# Patient Record
Sex: FEMALE | Race: OTHER | HISPANIC OR LATINO | URBAN - METROPOLITAN AREA
[De-identification: names, ages, dates, MRNs, and addresses within clinical notes are randomized per-mention and may not be internally consistent; named-entity substitution may affect disease eponyms.]

---

## 2017-07-29 ENCOUNTER — INPATIENT (INPATIENT)
Facility: HOSPITAL | Age: 77
LOS: 4 days | Discharge: HOME | End: 2017-08-03
Attending: STUDENT IN AN ORGANIZED HEALTH CARE EDUCATION/TRAINING PROGRAM | Admitting: STUDENT IN AN ORGANIZED HEALTH CARE EDUCATION/TRAINING PROGRAM
Payer: MEDICARE

## 2017-07-29 VITALS
DIASTOLIC BLOOD PRESSURE: 67 MMHG | RESPIRATION RATE: 18 BRPM | HEART RATE: 87 BPM | SYSTOLIC BLOOD PRESSURE: 143 MMHG | TEMPERATURE: 98 F | OXYGEN SATURATION: 100 %

## 2017-07-29 DIAGNOSIS — Z98.89 OTHER SPECIFIED POSTPROCEDURAL STATES: Chronic | ICD-10-CM

## 2017-07-29 DIAGNOSIS — Z90.49 ACQUIRED ABSENCE OF OTHER SPECIFIED PARTS OF DIGESTIVE TRACT: Chronic | ICD-10-CM

## 2017-07-29 LAB
ALBUMIN SERPL ELPH-MCNC: 4.1 G/DL — SIGNIFICANT CHANGE UP (ref 3.3–5)
ALP SERPL-CCNC: 71 U/L — SIGNIFICANT CHANGE UP (ref 40–120)
ALT FLD-CCNC: 13 U/L — SIGNIFICANT CHANGE UP (ref 4–33)
AMYLASE P1 CFR SERPL: 52 U/L — SIGNIFICANT CHANGE UP (ref 25–125)
APPEARANCE UR: CLEAR — SIGNIFICANT CHANGE UP
AST SERPL-CCNC: 15 U/L — SIGNIFICANT CHANGE UP (ref 4–32)
BACTERIA # UR AUTO: SIGNIFICANT CHANGE UP
BASE EXCESS BLDV CALC-SCNC: 1.7 MMOL/L — SIGNIFICANT CHANGE UP
BASOPHILS # BLD AUTO: 0.04 K/UL — SIGNIFICANT CHANGE UP (ref 0–0.2)
BASOPHILS NFR BLD AUTO: 0.4 % — SIGNIFICANT CHANGE UP (ref 0–2)
BILIRUB SERPL-MCNC: 0.6 MG/DL — SIGNIFICANT CHANGE UP (ref 0.2–1.2)
BILIRUB UR-MCNC: NEGATIVE — SIGNIFICANT CHANGE UP
BLOOD GAS VENOUS - CREATININE: 0.7 MG/DL — SIGNIFICANT CHANGE UP (ref 0.5–1.3)
BLOOD UR QL VISUAL: NEGATIVE — SIGNIFICANT CHANGE UP
BUN SERPL-MCNC: 8 MG/DL — SIGNIFICANT CHANGE UP (ref 7–23)
CALCIUM SERPL-MCNC: 9 MG/DL — SIGNIFICANT CHANGE UP (ref 8.4–10.5)
CHLORIDE BLDV-SCNC: 104 MMOL/L — SIGNIFICANT CHANGE UP (ref 96–108)
CHLORIDE SERPL-SCNC: 101 MMOL/L — SIGNIFICANT CHANGE UP (ref 98–107)
CO2 SERPL-SCNC: 25 MMOL/L — SIGNIFICANT CHANGE UP (ref 22–31)
COLOR SPEC: SIGNIFICANT CHANGE UP
CREAT SERPL-MCNC: 0.87 MG/DL — SIGNIFICANT CHANGE UP (ref 0.5–1.3)
EOSINOPHIL # BLD AUTO: 0.05 K/UL — SIGNIFICANT CHANGE UP (ref 0–0.5)
EOSINOPHIL NFR BLD AUTO: 0.4 % — SIGNIFICANT CHANGE UP (ref 0–6)
GAS PNL BLDV: 135 MMOL/L — LOW (ref 136–146)
GLUCOSE BLDV-MCNC: 111 — HIGH (ref 70–99)
GLUCOSE SERPL-MCNC: 112 MG/DL — HIGH (ref 70–99)
GLUCOSE UR-MCNC: NEGATIVE — SIGNIFICANT CHANGE UP
HCO3 BLDV-SCNC: 24 MMOL/L — SIGNIFICANT CHANGE UP (ref 20–27)
HCT VFR BLD CALC: 34.1 % — LOW (ref 34.5–45)
HCT VFR BLDV CALC: 34.6 % — SIGNIFICANT CHANGE UP (ref 34.5–45)
HGB BLD-MCNC: 10.9 G/DL — LOW (ref 11.5–15.5)
HGB BLDV-MCNC: 11.2 G/DL — LOW (ref 11.5–15.5)
IMM GRANULOCYTES # BLD AUTO: 0.05 # — SIGNIFICANT CHANGE UP
IMM GRANULOCYTES NFR BLD AUTO: 0.4 % — SIGNIFICANT CHANGE UP (ref 0–1.5)
KETONES UR-MCNC: NEGATIVE — SIGNIFICANT CHANGE UP
LACTATE BLDV-MCNC: 2 MMOL/L — SIGNIFICANT CHANGE UP (ref 0.5–2)
LEUKOCYTE ESTERASE UR-ACNC: HIGH
LIDOCAIN IGE QN: 17.4 U/L — SIGNIFICANT CHANGE UP (ref 7–60)
LYMPHOCYTES # BLD AUTO: 18.6 % — SIGNIFICANT CHANGE UP (ref 13–44)
LYMPHOCYTES # BLD AUTO: 2.1 K/UL — SIGNIFICANT CHANGE UP (ref 1–3.3)
MCHC RBC-ENTMCNC: 26.3 PG — LOW (ref 27–34)
MCHC RBC-ENTMCNC: 32 % — SIGNIFICANT CHANGE UP (ref 32–36)
MCV RBC AUTO: 82.4 FL — SIGNIFICANT CHANGE UP (ref 80–100)
MONOCYTES # BLD AUTO: 0.43 K/UL — SIGNIFICANT CHANGE UP (ref 0–0.9)
MONOCYTES NFR BLD AUTO: 3.8 % — SIGNIFICANT CHANGE UP (ref 2–14)
MUCOUS THREADS # UR AUTO: SIGNIFICANT CHANGE UP
NEUTROPHILS # BLD AUTO: 8.64 K/UL — HIGH (ref 1.8–7.4)
NEUTROPHILS NFR BLD AUTO: 76.4 % — SIGNIFICANT CHANGE UP (ref 43–77)
NITRITE UR-MCNC: NEGATIVE — SIGNIFICANT CHANGE UP
NRBC # FLD: 0 — SIGNIFICANT CHANGE UP
PCO2 BLDV: 51 MMHG — SIGNIFICANT CHANGE UP (ref 41–51)
PH BLDV: 7.34 PH — SIGNIFICANT CHANGE UP (ref 7.32–7.43)
PH UR: 7.5 — SIGNIFICANT CHANGE UP (ref 4.6–8)
PLATELET # BLD AUTO: 204 K/UL — SIGNIFICANT CHANGE UP (ref 150–400)
PMV BLD: 11.1 FL — SIGNIFICANT CHANGE UP (ref 7–13)
PO2 BLDV: 25 MMHG — LOW (ref 35–40)
POTASSIUM BLDV-SCNC: 4 MMOL/L — SIGNIFICANT CHANGE UP (ref 3.4–4.5)
POTASSIUM SERPL-MCNC: 4.3 MMOL/L — SIGNIFICANT CHANGE UP (ref 3.5–5.3)
POTASSIUM SERPL-SCNC: 4.3 MMOL/L — SIGNIFICANT CHANGE UP (ref 3.5–5.3)
PROT SERPL-MCNC: 6.8 G/DL — SIGNIFICANT CHANGE UP (ref 6–8.3)
PROT UR-MCNC: NEGATIVE — SIGNIFICANT CHANGE UP
RBC # BLD: 4.14 M/UL — SIGNIFICANT CHANGE UP (ref 3.8–5.2)
RBC # FLD: 14.8 % — HIGH (ref 10.3–14.5)
RBC CASTS # UR COMP ASSIST: SIGNIFICANT CHANGE UP (ref 0–?)
SAO2 % BLDV: 38.2 % — LOW (ref 60–85)
SODIUM SERPL-SCNC: 139 MMOL/L — SIGNIFICANT CHANGE UP (ref 135–145)
SP GR SPEC: 1 — SIGNIFICANT CHANGE UP (ref 1–1.03)
SQUAMOUS # UR AUTO: SIGNIFICANT CHANGE UP
UROBILINOGEN FLD QL: NORMAL E.U. — SIGNIFICANT CHANGE UP (ref 0.1–0.2)
WBC # BLD: 11.31 K/UL — HIGH (ref 3.8–10.5)
WBC # FLD AUTO: 11.31 K/UL — HIGH (ref 3.8–10.5)
WBC UR QL: SIGNIFICANT CHANGE UP (ref 0–?)

## 2017-07-29 PROCEDURE — 74177 CT ABD & PELVIS W/CONTRAST: CPT | Mod: 26

## 2017-07-29 RX ORDER — MORPHINE SULFATE 50 MG/1
4 CAPSULE, EXTENDED RELEASE ORAL ONCE
Qty: 0 | Refills: 0 | Status: DISCONTINUED | OUTPATIENT
Start: 2017-07-29 | End: 2017-07-29

## 2017-07-29 RX ORDER — ACETAMINOPHEN 500 MG
1000 TABLET ORAL ONCE
Qty: 0 | Refills: 0 | Status: COMPLETED | OUTPATIENT
Start: 2017-07-29 | End: 2017-07-29

## 2017-07-29 RX ORDER — DIPHENHYDRAMINE HCL 50 MG
25 CAPSULE ORAL ONCE
Qty: 0 | Refills: 0 | Status: COMPLETED | OUTPATIENT
Start: 2017-07-29 | End: 2017-07-29

## 2017-07-29 RX ORDER — METRONIDAZOLE 500 MG
500 TABLET ORAL ONCE
Qty: 0 | Refills: 0 | Status: COMPLETED | OUTPATIENT
Start: 2017-07-29 | End: 2017-07-29

## 2017-07-29 RX ORDER — CIPROFLOXACIN LACTATE 400MG/40ML
400 VIAL (ML) INTRAVENOUS ONCE
Qty: 0 | Refills: 0 | Status: COMPLETED | OUTPATIENT
Start: 2017-07-29 | End: 2017-07-29

## 2017-07-29 RX ORDER — SODIUM CHLORIDE 9 MG/ML
1000 INJECTION INTRAMUSCULAR; INTRAVENOUS; SUBCUTANEOUS ONCE
Qty: 0 | Refills: 0 | Status: COMPLETED | OUTPATIENT
Start: 2017-07-29 | End: 2017-07-29

## 2017-07-29 RX ADMIN — SODIUM CHLORIDE 1000 MILLILITER(S): 9 INJECTION INTRAMUSCULAR; INTRAVENOUS; SUBCUTANEOUS at 18:49

## 2017-07-29 RX ADMIN — MORPHINE SULFATE 4 MILLIGRAM(S): 50 CAPSULE, EXTENDED RELEASE ORAL at 21:40

## 2017-07-29 RX ADMIN — MORPHINE SULFATE 4 MILLIGRAM(S): 50 CAPSULE, EXTENDED RELEASE ORAL at 21:10

## 2017-07-29 RX ADMIN — Medication 100 MILLIGRAM(S): at 22:50

## 2017-07-29 RX ADMIN — MORPHINE SULFATE 4 MILLIGRAM(S): 50 CAPSULE, EXTENDED RELEASE ORAL at 23:51

## 2017-07-29 RX ADMIN — Medication 25 MILLIGRAM(S): at 23:56

## 2017-07-29 RX ADMIN — Medication 1000 MILLIGRAM(S): at 21:10

## 2017-07-29 RX ADMIN — Medication 400 MILLIGRAM(S): at 18:48

## 2017-07-29 NOTE — ED ADULT NURSE NOTE - PSH
History of appendectomy    History of hysterectomy    S/P cholecystectomy  open cholecystectomy, post op retained stone, ERCP/sphincterotomy

## 2017-07-29 NOTE — ED ADULT NURSE NOTE - OBJECTIVE STATEMENT
Pt received in spot 18. Alert and oriented x3. primarily Nepali speaking. Co diffuse abdominal pain x 1 month. Denies n/v/d. Went to GI and diagnosed with colitis and prescribed pepto bismol but states it is not helping. Hx of diverticulitis, htn, high cholesterol, diabetes. Labs drawn and sent. IV placed. Comfort measures provided.  MD at bedside.

## 2017-07-29 NOTE — ED PROVIDER NOTE - PROGRESS NOTE DETAILS
Kb Flood, PGY2: CT shows diverticulitis with intramural abscess. Starting cipro/flagyl and surgery consulted. Pt aware. Given 4mg morphine for pain control

## 2017-07-29 NOTE — ED PROVIDER NOTE - MEDICAL DECISION MAKING DETAILS
78 yo F with PMH of HTN, Hypothyroidism, HLD, DM, diverticulitis p/w abdominal pain for 1 month concerning for possible diverticulitis. Will check cbc, cmp, lipase, CT a/p. Give IV tylenol and 1L NS bolus.

## 2017-07-29 NOTE — ED ADULT NURSE NOTE - PMH
Bilateral dry eyes    Diabetes mellitus    Dyslipidemia    History of ectopic pregnancy    Hypothyroidism    Neuropathy

## 2017-07-29 NOTE — ED ADULT TRIAGE NOTE - CHIEF COMPLAINT QUOTE
Co abdominal pain x 1 month. Denies n/v/d. Went to GI and diagnosed with colitis and prescribed pepto bismol but states it is not helping. Hx of diverticulitis, htn, high cholesterol, diabetes.

## 2017-07-29 NOTE — ED PROVIDER NOTE - ATTENDING CONTRIBUTION TO CARE
Attending Statement: I have personally seen and examined this patient. I have fully participated in the care of this patient. I have reviewed all pertinent clinical information, including history physical exam, plan and the Resident's note and agree except as noted   76yo F hx of HTN, Hypothryroidsim, HLD, DM, hx of diverticulitis pw abdominal pain for a month. Worsening today. Pain located in lower left greater than right. no fever/chills/ no nausea/vomit or diarrhea. +flatus. no urinary complaints. pt went to see gastroenterology 2 weeks ago told to have colitis. symptoms have continued.   Vital signs noted. mild discomfort. normal S1-S2 No resp distress. able to speak in full and clear sentences. no wheeze, rales or stridor. soft tender LLQ. no guarding.  plan labs, IVF, pain med, ct abdomen/pelvis, re asses

## 2017-07-30 DIAGNOSIS — K57.20 DIVERTICULITIS OF LARGE INTESTINE WITH PERFORATION AND ABSCESS WITHOUT BLEEDING: ICD-10-CM

## 2017-07-30 LAB
APTT BLD: 33.1 SEC — SIGNIFICANT CHANGE UP (ref 27.5–37.4)
BLD GP AB SCN SERPL QL: NEGATIVE — SIGNIFICANT CHANGE UP
BUN SERPL-MCNC: 6 MG/DL — LOW (ref 7–23)
CALCIUM SERPL-MCNC: 8.6 MG/DL — SIGNIFICANT CHANGE UP (ref 8.4–10.5)
CHLORIDE SERPL-SCNC: 106 MMOL/L — SIGNIFICANT CHANGE UP (ref 98–107)
CO2 SERPL-SCNC: 24 MMOL/L — SIGNIFICANT CHANGE UP (ref 22–31)
CREAT SERPL-MCNC: 0.79 MG/DL — SIGNIFICANT CHANGE UP (ref 0.5–1.3)
GLUCOSE SERPL-MCNC: 96 MG/DL — SIGNIFICANT CHANGE UP (ref 70–99)
HCT VFR BLD CALC: 34.1 % — LOW (ref 34.5–45)
HGB BLD-MCNC: 11 G/DL — LOW (ref 11.5–15.5)
INR BLD: 1.03 — SIGNIFICANT CHANGE UP (ref 0.88–1.17)
MCHC RBC-ENTMCNC: 26.3 PG — LOW (ref 27–34)
MCHC RBC-ENTMCNC: 32.3 % — SIGNIFICANT CHANGE UP (ref 32–36)
MCV RBC AUTO: 81.4 FL — SIGNIFICANT CHANGE UP (ref 80–100)
NRBC # FLD: 0 — SIGNIFICANT CHANGE UP
PLATELET # BLD AUTO: 191 K/UL — SIGNIFICANT CHANGE UP (ref 150–400)
PMV BLD: 11 FL — SIGNIFICANT CHANGE UP (ref 7–13)
POTASSIUM SERPL-MCNC: 3.7 MMOL/L — SIGNIFICANT CHANGE UP (ref 3.5–5.3)
POTASSIUM SERPL-SCNC: 3.7 MMOL/L — SIGNIFICANT CHANGE UP (ref 3.5–5.3)
PROTHROM AB SERPL-ACNC: 11.5 SEC — SIGNIFICANT CHANGE UP (ref 9.8–13.1)
RBC # BLD: 4.19 M/UL — SIGNIFICANT CHANGE UP (ref 3.8–5.2)
RBC # FLD: 15 % — HIGH (ref 10.3–14.5)
RH IG SCN BLD-IMP: POSITIVE — SIGNIFICANT CHANGE UP
SODIUM SERPL-SCNC: 143 MMOL/L — SIGNIFICANT CHANGE UP (ref 135–145)
WBC # BLD: 9.04 K/UL — SIGNIFICANT CHANGE UP (ref 3.8–10.5)
WBC # FLD AUTO: 9.04 K/UL — SIGNIFICANT CHANGE UP (ref 3.8–10.5)

## 2017-07-30 PROCEDURE — 99232 SBSQ HOSP IP/OBS MODERATE 35: CPT | Mod: GC

## 2017-07-30 PROCEDURE — 93010 ELECTROCARDIOGRAM REPORT: CPT

## 2017-07-30 RX ORDER — INSULIN LISPRO 100/ML
VIAL (ML) SUBCUTANEOUS AT BEDTIME
Qty: 0 | Refills: 0 | Status: DISCONTINUED | OUTPATIENT
Start: 2017-07-30 | End: 2017-07-31

## 2017-07-30 RX ORDER — DEXTROSE MONOHYDRATE, SODIUM CHLORIDE, AND POTASSIUM CHLORIDE 50; .745; 4.5 G/1000ML; G/1000ML; G/1000ML
1000 INJECTION, SOLUTION INTRAVENOUS
Qty: 0 | Refills: 0 | Status: DISCONTINUED | OUTPATIENT
Start: 2017-07-30 | End: 2017-08-02

## 2017-07-30 RX ORDER — ENOXAPARIN SODIUM 100 MG/ML
40 INJECTION SUBCUTANEOUS ONCE
Qty: 0 | Refills: 0 | Status: COMPLETED | OUTPATIENT
Start: 2017-07-30 | End: 2017-07-30

## 2017-07-30 RX ORDER — ACETAMINOPHEN 500 MG
1000 TABLET ORAL ONCE
Qty: 0 | Refills: 0 | Status: COMPLETED | OUTPATIENT
Start: 2017-07-30 | End: 2017-07-30

## 2017-07-30 RX ORDER — PANTOPRAZOLE SODIUM 20 MG/1
40 TABLET, DELAYED RELEASE ORAL DAILY
Qty: 0 | Refills: 0 | Status: DISCONTINUED | OUTPATIENT
Start: 2017-07-30 | End: 2017-07-30

## 2017-07-30 RX ORDER — ENOXAPARIN SODIUM 100 MG/ML
40 INJECTION SUBCUTANEOUS DAILY
Qty: 0 | Refills: 0 | Status: DISCONTINUED | OUTPATIENT
Start: 2017-07-30 | End: 2017-07-30

## 2017-07-30 RX ORDER — MORPHINE SULFATE 50 MG/1
4 CAPSULE, EXTENDED RELEASE ORAL EVERY 4 HOURS
Qty: 0 | Refills: 0 | Status: DISCONTINUED | OUTPATIENT
Start: 2017-07-30 | End: 2017-08-01

## 2017-07-30 RX ORDER — LEVOTHYROXINE SODIUM 125 MCG
44 TABLET ORAL DAILY
Qty: 0 | Refills: 0 | Status: DISCONTINUED | OUTPATIENT
Start: 2017-07-30 | End: 2017-07-30

## 2017-07-30 RX ORDER — DEXTROSE 50 % IN WATER 50 %
1 SYRINGE (ML) INTRAVENOUS ONCE
Qty: 0 | Refills: 0 | Status: DISCONTINUED | OUTPATIENT
Start: 2017-07-30 | End: 2017-07-30

## 2017-07-30 RX ORDER — FAMOTIDINE 10 MG/ML
20 INJECTION INTRAVENOUS
Qty: 0 | Refills: 0 | Status: DISCONTINUED | OUTPATIENT
Start: 2017-07-30 | End: 2017-08-01

## 2017-07-30 RX ORDER — CEFOTETAN DISODIUM 1 G
2 VIAL (EA) INJECTION EVERY 12 HOURS
Qty: 0 | Refills: 0 | Status: DISCONTINUED | OUTPATIENT
Start: 2017-07-30 | End: 2017-08-03

## 2017-07-30 RX ORDER — DIPHENHYDRAMINE HCL 50 MG
25 CAPSULE ORAL ONCE
Qty: 0 | Refills: 0 | Status: COMPLETED | OUTPATIENT
Start: 2017-07-30 | End: 2017-07-30

## 2017-07-30 RX ORDER — METRONIDAZOLE 500 MG
500 TABLET ORAL EVERY 8 HOURS
Qty: 0 | Refills: 0 | Status: DISCONTINUED | OUTPATIENT
Start: 2017-07-30 | End: 2017-07-30

## 2017-07-30 RX ORDER — DEXTROSE 50 % IN WATER 50 %
12.5 SYRINGE (ML) INTRAVENOUS ONCE
Qty: 0 | Refills: 0 | Status: DISCONTINUED | OUTPATIENT
Start: 2017-07-30 | End: 2017-07-30

## 2017-07-30 RX ORDER — ENOXAPARIN SODIUM 100 MG/ML
40 INJECTION SUBCUTANEOUS
Qty: 0 | Refills: 0 | Status: DISCONTINUED | OUTPATIENT
Start: 2017-07-30 | End: 2017-07-30

## 2017-07-30 RX ORDER — ENOXAPARIN SODIUM 100 MG/ML
40 INJECTION SUBCUTANEOUS
Qty: 0 | Refills: 0 | Status: DISCONTINUED | OUTPATIENT
Start: 2017-07-30 | End: 2017-08-03

## 2017-07-30 RX ORDER — FAMOTIDINE 10 MG/ML
20 INJECTION INTRAVENOUS DAILY
Qty: 0 | Refills: 0 | Status: DISCONTINUED | OUTPATIENT
Start: 2017-07-30 | End: 2017-07-30

## 2017-07-30 RX ORDER — INSULIN LISPRO 100/ML
VIAL (ML) SUBCUTANEOUS
Qty: 0 | Refills: 0 | Status: DISCONTINUED | OUTPATIENT
Start: 2017-07-30 | End: 2017-07-31

## 2017-07-30 RX ORDER — SODIUM CHLORIDE 9 MG/ML
1000 INJECTION, SOLUTION INTRAVENOUS
Qty: 0 | Refills: 0 | Status: DISCONTINUED | OUTPATIENT
Start: 2017-07-30 | End: 2017-07-30

## 2017-07-30 RX ORDER — DEXTROSE 50 % IN WATER 50 %
25 SYRINGE (ML) INTRAVENOUS ONCE
Qty: 0 | Refills: 0 | Status: DISCONTINUED | OUTPATIENT
Start: 2017-07-30 | End: 2017-07-30

## 2017-07-30 RX ORDER — LEVOTHYROXINE SODIUM 125 MCG
44 TABLET ORAL
Qty: 0 | Refills: 0 | Status: DISCONTINUED | OUTPATIENT
Start: 2017-07-31 | End: 2017-08-01

## 2017-07-30 RX ORDER — DIPHENHYDRAMINE HCL 50 MG
10 CAPSULE ORAL ONCE
Qty: 0 | Refills: 0 | Status: DISCONTINUED | OUTPATIENT
Start: 2017-07-30 | End: 2017-07-30

## 2017-07-30 RX ORDER — GLUCAGON INJECTION, SOLUTION 0.5 MG/.1ML
1 INJECTION, SOLUTION SUBCUTANEOUS ONCE
Qty: 0 | Refills: 0 | Status: DISCONTINUED | OUTPATIENT
Start: 2017-07-30 | End: 2017-07-30

## 2017-07-30 RX ADMIN — Medication 1.25 MILLIGRAM(S): at 11:19

## 2017-07-30 RX ADMIN — Medication 44 MICROGRAM(S): at 05:42

## 2017-07-30 RX ADMIN — MORPHINE SULFATE 4 MILLIGRAM(S): 50 CAPSULE, EXTENDED RELEASE ORAL at 19:02

## 2017-07-30 RX ADMIN — SODIUM CHLORIDE 125 MILLILITER(S): 9 INJECTION, SOLUTION INTRAVENOUS at 05:42

## 2017-07-30 RX ADMIN — Medication 25 MILLIGRAM(S): at 19:02

## 2017-07-30 RX ADMIN — Medication 400 MILLIGRAM(S): at 02:14

## 2017-07-30 RX ADMIN — MORPHINE SULFATE 4 MILLIGRAM(S): 50 CAPSULE, EXTENDED RELEASE ORAL at 00:21

## 2017-07-30 RX ADMIN — Medication 200 MILLIGRAM(S): at 00:04

## 2017-07-30 RX ADMIN — Medication 1.25 MILLIGRAM(S): at 18:27

## 2017-07-30 RX ADMIN — Medication 1.25 MILLIGRAM(S): at 02:14

## 2017-07-30 RX ADMIN — Medication 1000 MILLIGRAM(S): at 21:52

## 2017-07-30 RX ADMIN — MORPHINE SULFATE 4 MILLIGRAM(S): 50 CAPSULE, EXTENDED RELEASE ORAL at 05:52

## 2017-07-30 RX ADMIN — DEXTROSE MONOHYDRATE, SODIUM CHLORIDE, AND POTASSIUM CHLORIDE 75 MILLILITER(S): 50; .745; 4.5 INJECTION, SOLUTION INTRAVENOUS at 21:28

## 2017-07-30 RX ADMIN — FAMOTIDINE 20 MILLIGRAM(S): 10 INJECTION INTRAVENOUS at 19:03

## 2017-07-30 RX ADMIN — Medication 25 MILLIGRAM(S): at 09:27

## 2017-07-30 RX ADMIN — Medication 100 GRAM(S): at 06:45

## 2017-07-30 RX ADMIN — Medication 1000 MILLIGRAM(S): at 02:14

## 2017-07-30 RX ADMIN — Medication 100 GRAM(S): at 18:27

## 2017-07-30 RX ADMIN — MORPHINE SULFATE 4 MILLIGRAM(S): 50 CAPSULE, EXTENDED RELEASE ORAL at 19:22

## 2017-07-30 RX ADMIN — SODIUM CHLORIDE 125 MILLILITER(S): 9 INJECTION, SOLUTION INTRAVENOUS at 02:14

## 2017-07-30 RX ADMIN — MORPHINE SULFATE 4 MILLIGRAM(S): 50 CAPSULE, EXTENDED RELEASE ORAL at 06:10

## 2017-07-30 RX ADMIN — SODIUM CHLORIDE 125 MILLILITER(S): 9 INJECTION, SOLUTION INTRAVENOUS at 00:48

## 2017-07-30 RX ADMIN — Medication 1.25 MILLIGRAM(S): at 05:43

## 2017-07-30 RX ADMIN — DEXTROSE MONOHYDRATE, SODIUM CHLORIDE, AND POTASSIUM CHLORIDE 75 MILLILITER(S): 50; .745; 4.5 INJECTION, SOLUTION INTRAVENOUS at 10:16

## 2017-07-30 RX ADMIN — Medication 1.25 MILLIGRAM(S): at 23:17

## 2017-07-30 RX ADMIN — ENOXAPARIN SODIUM 40 MILLIGRAM(S): 100 INJECTION SUBCUTANEOUS at 05:42

## 2017-07-30 RX ADMIN — Medication 400 MILLIGRAM(S): at 21:28

## 2017-07-30 RX ADMIN — Medication 100 MILLIGRAM(S): at 05:42

## 2017-07-30 RX ADMIN — ENOXAPARIN SODIUM 40 MILLIGRAM(S): 100 INJECTION SUBCUTANEOUS at 19:02

## 2017-07-30 NOTE — H&P ADULT - NSHPPHYSICALEXAM_GEN_ALL_CORE
NAD, awake and alert  Respirations nonlabored  CV Regular  Abdomen soft, tender in lower quadrants, minimally distended  No guarding or rebound tenderness   Old RUQ and midline abdominal scars  Extremities warm, moves spontaneously

## 2017-07-30 NOTE — PROGRESS NOTE ADULT - ATTENDING COMMENTS
July 30th, 2017  9:20 AM    Hospital Day #1    This patient was seen and evaluated on daily rounds. Care discussed with the multidisciplinary team during B-Team morning report sign out.    BP		=	134 – 170/68 - 83  P		=	65 - 75		O2 Sat	=	96% - 98%  R		=	17 - 18		I/O	=	1,025 in/400 out  Temp		=	36.3 – 36.8			+ 0.6 liters since hospitalization    Glucose	=	105    Weight wt	=	-  BMI		=	-            WBC	=	9	Na		=	143  Neutro	=	-	K		=	3.7  Bands	=	-	Cl		=	106  Hgb	=	11	HCO3		=	24  Hct	=	34%	Glucose	=	96  Plts	=	191	BUN		=	6  			Creat		=	0.8  PT	=	11.5  PTT	=	33.1  INR	=	1.03    Blood 7/29	-	Received in lab  Blood 7/29	-	Received in lab    Contrast enhanced CT scan of the abdomen and pelvis 7/29	- Diverticulosis with long segment of sigmoid colon with a thickened wall and surrounding inflammation. Intramural abscess. Bilateral inguinal hernias and both gallbladder and uterus are surgically absent. (Was hospitalized on the surgical service here at [facility] from 12/8/13 – 12/12/13 for evaluation and treatment of abdominal pain and hematochezia [Dr. Romero]. A CT scan on 12/8/13 also revealed a long segment of thickened wall of the descending colon – distal to the splenic flexure – thought to be due to ischemia).    Remains on:    1)	LR @ 125 ml/hour  2)	Cefotan 2 gm IVPB q12 hours	-	Day #0  3)	Vasotec 1.25 mg IVP q6 hours  4)	Pepcid 20 mg IVP q24 hours  5)	Protonix 40 mg IVP q24 hours  6)	Synthroid 44 IVP q24 hours  7)	Insulin sliding scale    Assessment:	This patient is assessed as being a hypertensive, non-insulin requiring type 2 diabetic female who has neuropathy, hyperlipidemia, and hypothyroidism who presented to the ED at [facility] at approximately 5:10 PM on 7/29/17 with complaints of having abdominal pain that had been present for the last month. She had previously been hospitalized here at [facility] for treatment of abdominal pain and hematochezia and had been found to have left sided colitis, possibly from ischemia. On presentation she had hypertension and she had lower abdominal tenderness without guarding. She had a mild leukocytosis. A CT scan revealed evidence of Hinchey II sigmoid diverticulitis.    Plan to:    1)	Review imaging studies.  2)	Provide mechanical and chemical VTE prophylaxis – Venodynes and enoxaparin ordered.  3)	Re-check WBC and differential at intervals.  4)	Does not require both H2 blockers and proton pump inhibitors. Will discontinue  	the Protonix.  5)	Change the LR to D5½NS + 20 KCl/liter.  6)	Obtain an EKG and a chest radiograph.  7)	Allow and assist this patient to be out of bed.  8)	Treat hypertension. May benefit from ß-blockers.  9)	Inform her gastroenterologist, Dr. Stu Cotter that the patient has been  	hospitalized.  10)	Full support in place    Scotty Stallworth  20 minutes exclusive of procedures July 30th, 2017  9:20 AM    Hospital Day #1    This patient was seen and evaluated on daily rounds. Care discussed with the multidisciplinary team during B-Team morning report sign out.    BP		=	134 – 170/68 - 83  P		=	65 - 75		O2 Sat	=	96% - 98%  R		=	17 - 18		I/O	=	1,025 in/400 out  Temp		=	36.3 – 36.8			+ 0.6 liters since hospitalization    Glucose	=	105    Weight wt	=	-  BMI		=	-            WBC	=	9	Na		=	143  Neutro	=	-	K		=	3.7  Bands	=	-	Cl		=	106  Hgb	=	11	HCO3		=	24  Hct	=	34%	Glucose	=	96  Plts	=	191	BUN		=	6  			Creat		=	0.8  PT	=	11.5  PTT	=	33.1  INR	=	1.03    Blood 7/29	-	Received in lab  Blood 7/29	-	Received in lab    Contrast enhanced CT scan of the abdomen and pelvis 7/29	- Diverticulosis with long segment of sigmoid colon with a thickened wall and surrounding inflammation. Intramural abscess. Bilateral inguinal hernias and both gallbladder and uterus are surgically absent. (Was hospitalized on the surgical service here at [facility] from 12/8/13 – 12/12/13 for evaluation and treatment of abdominal pain and hematochezia [Dr. Romero]. A CT scan on 12/8/13 also revealed a long segment of thickened wall of the descending colon – distal to the splenic flexure – thought to be due to ischemia).    Remains on:    1)	LR @ 125 ml/hour  2)	Cefotan 2 gm IVPB q12 hours	-	Day #0  3)	Vasotec 1.25 mg IVP q6 hours  4)	Pepcid 20 mg IVP q24 hours  5)	Protonix 40 mg IVP q24 hours  6)	Synthroid 44 IVP q24 hours  7)	Insulin sliding scale    Assessment:	This patient is assessed as being a hypertensive, non-insulin requiring type 2 diabetic female who has neuropathy, hyperlipidemia, and hypothyroidism who presented to the ED at [facility] at approximately 5:10 PM on 7/29/17 with complaints of having abdominal pain that had been present for the last month. She had previously been hospitalized here at [facility] for treatment of abdominal pain and hematochezia and had been found to have left sided colitis, possibly from ischemia. On presentation she had hypertension and she had lower abdominal tenderness without guarding. She had a mild leukocytosis. A CT scan revealed evidence of Hinchey II sigmoid diverticulitis.    Plan to:    1)	Review imaging studies.  2)	Provide mechanical and chemical VTE prophylaxis – Venodynes and enoxaparin  	ordered.  3)	Re-check WBC and differential at intervals.  4)	Does not require both H2 blockers and proton pump inhibitors. Will discontinue  	the Protonix.  5)	Change the LR to D5½NS + 20 KCl/liter.  6)	Obtain an EKG and a chest radiograph.  7)	Allow and assist this patient to be out of bed.  8)	Treat hypertension. May benefit from ß-blockers.  9)	Inform her gastroenterologist, Dr. Stu Cotter that the patient has been  	hospitalized.  10)	Full support in place    Scotty Stallworth  20 minutes exclusive of procedures July 30th, 2017  9:20 AM    Hospital Day #1    This patient was seen and evaluated on daily rounds. Care discussed with the multidisciplinary team during B-Team morning report sign out.    BP		=	134 – 170/68 - 83  P		=	65 - 75		O2 Sat	=	96% - 98%  R		=	17 - 18		I/O	=	1,025 in/400 out  Temp		=	36.3 – 36.8			+ 0.6 liters since hospitalization    Glucose	=	105    Weight wt	=	-  BMI		=	-            WBC	=	9	Na		=	143  Neutro	=	-	K		=	3.7  Bands	=	-	Cl		=	106  Hgb	=	11	HCO3		=	24  Hct	=	34%	Glucose	=	96  Plts	=	191	BUN		=	6  			Creat		=	0.8  PT	=	11.5  PTT	=	33.1  INR	=	1.03    Blood 7/29	-	Received in lab  Blood 7/29	-	Received in lab    Contrast enhanced CT scan of the abdomen and pelvis 7/29	- Diverticulosis with long segment of sigmoid colon with a thickened wall and surrounding inflammation. Intramural abscess. Bilateral inguinal hernias and both gallbladder and uterus are surgically absent. (Was hospitalized on the surgical service here at [facility] from 12/8/13 – 12/12/13 for evaluation and treatment of abdominal pain and hematochezia [Dr. Romero]. A CT scan on 12/8/13 also revealed a long segment of thickened wall of the descending colon – distal to the splenic flexure – thought to be due to ischemia).    Remains on:    1)	LR @ 125 ml/hour  2)	Cefotan 2 gm IVPB q12 hours	-	Day #0  3)	Vasotec 1.25 mg IVP q6 hours  4)	Pepcid 20 mg IVP q24 hours  5)	Protonix 40 mg IVP q24 hours  6)	Synthroid 44 IVP q24 hours  7)	Insulin sliding scale    Assessment:	This patient is assessed as being a hypertensive, non-insulin requiring type 2 diabetic female who has neuropathy, hyperlipidemia, and hypothyroidism who presented to the ED at [facility] at approximately 5:10 PM on 7/29/17 with complaints of having abdominal pain that had been present for the last month. She had previously been hospitalized here at [facility] for treatment of abdominal pain and hematochezia and had been found to have left sided colitis, possibly from ischemia. On presentation she had hypertension and she had lower abdominal tenderness without guarding. She had a mild leukocytosis. A CT scan revealed evidence of Hinchey II sigmoid diverticulitis.    Plan to:    1)	Review imaging studies.  2)	Provide mechanical and chemical VTE prophylaxis – Venodynes and enoxaparin  	ordered.  3)	Re-check WBC and differential at intervals.  4)	Does not require both H2 blockers and proton pump inhibitors. Will discontinue  	the Protonix.  5)	Change the LR to D5½NS + 20 KCl/liter.  6)	Obtain an EKG and a chest radiograph.  7)	Check a hemoglobin A1-C and the TSH  8)	Allow and assist this patient to be out of bed.  9)	Treat hypertension. May benefit from ß-blockers.  10)	Inform her gastroenterologist, Dr. Stu Cotter that the patient has been  	hospitalized.  11)	Full support in place    Scotty Stallworth  20 minutes exclusive of procedures July 30th, 2017  9:20 AM    Hospital Day #1    This patient was seen and evaluated on daily rounds. Care discussed with the multidisciplinary team during B-Team morning report sign out.    BP		=	134 – 170/68 - 83  P		=	65 - 75		O2 Sat	=	96% - 98%  R		=	17 - 18		I/O	=	1,025 in/400 out  Temp		=	36.3 – 36.8			+ 0.6 liters since hospitalization    Glucose	=	105    Weight wt	=	-  BMI		=	-    Awake, alert, in no distress. Non-toxic.  Anicteric. Pupils reactive and extra-occular movements intact.  No thrush.  No JVD.  Lungs clear bilaterally with non-labored respirations. Symmetrical chest wall movements.  COR - RRR  Abdomen soft, obese. Lower abdominal pain and tenderness without guarding or rebound. No palpable masses.  Extremities well perfused.    WBC	=	9	Na		=	143  Neutro	=	-	K		=	3.7  Bands	=	-	Cl		=	106  Hgb	=	11	HCO3		=	24  Hct	=	34%	Glucose	=	96  Plts	=	191	BUN		=	6  			Creat		=	0.8  PT	=	11.5  PTT	=	33.1  INR	=	1.03    Blood 7/29	-	Received in lab  Blood 7/29	-	Received in lab    Contrast enhanced CT scan of the abdomen and pelvis 7/29 - Diverticulosis with long segment of sigmoid colon with a thickened wall and surrounding inflammation. Intramural abscess. Bilateral inguinal hernias and both gallbladder and uterus are surgically absent. (Was hospitalized on the surgical service here at [facility] from 12/8/13 – 12/12/13 for evaluation and treatment of abdominal pain and hematochezia [Dr. Romero]. A CT scan on 12/8/13 also revealed a long segment of thickened wall of the descending colon – distal to the splenic flexure – thought to be due to ischemia).    Remains on:    1)	LR @ 125 ml/hour  2)	Cefotan 2 gm IVPB q12 hours	-	Day #0  3)	Vasotec 1.25 mg IVP q6 hours  4)	Pepcid 20 mg IVP q24 hours  5)	Protonix 40 mg IVP q24 hours  6)	Synthroid 44 IVP q24 hours  7)	Insulin sliding scale    Assessment:	This patient is assessed as being a hypertensive, obese, non-insulin requiring type 2 diabetic female who has neuropathy, hyperlipidemia, and hypothyroidism who presented to the ED at [facility] at approximately 5:10 PM on 7/29/17 with complaints of having abdominal pain that had been present for the last month. She had previously been hospitalized here at [facility] for treatment of abdominal pain and hematochezia and had been found to have left sided colitis, possibly from ischemia. On presentation she had hypertension and she had lower abdominal tenderness without guarding. She had a mild leukocytosis. A CT scan revealed evidence of Hinchey II sigmoid diverticulitis.    Plan to:    1)	Review imaging studies.  2)	Provide mechanical and chemical VTE prophylaxis – Venodynes and enoxaparin  	ordered.  3)	Re-check WBC and differential at intervals.  4)	Does not require both H2 blockers and proton pump inhibitors. Will discontinue  	the Protonix.  5)	Change the LR to D5½NS + 20 KCl/liter.  6)	Obtain an EKG and a chest radiograph.  7)	Check a hemoglobin A1-C and the TSH  8)	Allow and assist this patient to be out of bed.  9)	Treat hypertension. May benefit from ß-blockers.  10)	Inform her gastroenterologist, Dr. Stu Cotter that the patient has been  	hospitalized.  11)	Full support in place    Scotty Stallworth  20 minutes exclusive of procedures

## 2017-07-30 NOTE — H&P ADULT - ATTENDING COMMENTS
I have reviewed the history, pertinent labs and imaging, and discussed the care with the consult resident.    The active issues are:  1. sigmoid diverticulitis with abscess    Initiate iv antibiotics and bowel rest.  Hopefully can manage this nonoperatively; otherwise may require a Carlos's procedure.

## 2017-07-30 NOTE — PROGRESS NOTE ADULT - SUBJECTIVE AND OBJECTIVE BOX
B team surgery progress note    No acute events overnight (7/29) nor during the day of 7/30. Patient is NPO. No nausea or vomiting. Continues to receive antibiotics for diverticulitis. No flatus or stool. Pain is well controlled.    ICU Vital Signs Last 24 Hrs  T(C): 36.9 (30 Jul 2017 14:18), Max: 36.9 (29 Jul 2017 17:10)  T(F): 98.5 (30 Jul 2017 14:18), Max: 98.5 (30 Jul 2017 14:18)  HR: 68 (30 Jul 2017 14:18) (65 - 87)  BP: 162/77 (30 Jul 2017 14:18) (134/68 - 173/78)  RR: 18 (30 Jul 2017 14:18) (17 - 18)  SpO2: 98% (30 Jul 2017 14:18) (96% - 100%)    Vitals: Afebrile, VSS  Abd: soft, non-distended, tender to palpation in LLQ                          11.0   9.04  )-----------( 191      ( 30 Jul 2017 05:30 )             34.1     07-30    143  |  106  |  6<L>  ----------------------------<  96  3.7   |  24  |  0.79    Ca    8.6      30 Jul 2017 05:30    I&O's Detail    29 Jul 2017 07:01  -  30 Jul 2017 07:00  --------------------------------------------------------  IN:    IV PiggyBack: 150 mL    lactated ringers.: 875 mL  Total IN: 1025 mL    OUT:    Voided: 400 mL  Total OUT: 400 mL    Total NET: 625 mL      30 Jul 2017 07:01  -  30 Jul 2017 16:22  --------------------------------------------------------  IN:  Total IN: 0 mL    OUT:    Voided: 200 mL  Total OUT: 200 mL    Total NET: -200 mL

## 2017-07-30 NOTE — H&P ADULT - FAMILY HISTORY
<<-----Click on this checkbox to enter Family History Family history of stroke     Sibling  Still living? Unknown  Family history of diabetes mellitus (DM), Age at diagnosis: Age Unknown

## 2017-07-30 NOTE — H&P ADULT - HISTORY OF PRESENT ILLNESS
77F who says she has had abdominal pain for about a month, but over the past few days it has become severe and is associated with diarrhea. It radiates to her back. She has taken tylenol for the pain with no relief. The pain is diffuse but worse in the lower abdomen. She denies fever or chills. She denies nausea/vomiting, but says she hasn't had much PO due to pain. She has known diverticulosis, and sees Dr. Cotter (info above), who last performed a colonoscopy about a year ago. She has also had upper endoscopies for a history of GERD.  In the ED:  ICU Vital Signs Last 24 Hrs  T(C): 36.3 (29 Jul 2017 20:34), Max: 36.9 (29 Jul 2017 17:10)  T(F): 97.4 (29 Jul 2017 20:34), Max: 98.4 (29 Jul 2017 17:10)  HR: 75 (29 Jul 2017 23:51) (75 - 87)  BP: 173/78 (29 Jul 2017 23:51) (139/54 - 173/78)  BP(mean): --  ABP: --  ABP(mean): --  RR: 18 (29 Jul 2017 23:51) (18 - 18)  SpO2: 97% (29 Jul 2017 23:51) (97% - 100%)

## 2017-07-30 NOTE — H&P ADULT - ASSESSMENT
77F with diverticulitis with intramural abscess  - admit to [facility] team surgery  - NPO/IV Fluids  - serial abd exams  - IV Cefotetan and Flagyl  - pain control  - VTE ppx  - will restart any home meds that can be converted to IV  - conservative management for now, will reassess  - d/w Dr. Sharath Marion [place] [id number]

## 2017-07-30 NOTE — CHART NOTE - NSCHARTNOTEFT_GEN_A_CORE
Patient has R. Leg pain . It is increased by movement and specially by rising the leg.  Its musculoskeletal in origin.   Tylenol will be helpful.

## 2017-07-30 NOTE — PROGRESS NOTE ADULT - ASSESSMENT
77F with HTN, DM DLD, hypothyroidism admitted for a Hinchey II sigmoid diverticulitis, presently undergoing non-operative therapy.    Plan:  - Cont non-op therapy: IV abx, NPO   - Obtain EKG and CXR   - DVT ppx

## 2017-07-30 NOTE — H&P ADULT - PMH
Bilateral dry eyes    Diabetes mellitus    Dyslipidemia    History of ectopic pregnancy    Hypertension    Hypothyroidism    Neuropathy

## 2017-07-31 LAB
BASOPHILS # BLD AUTO: 0.04 K/UL — SIGNIFICANT CHANGE UP (ref 0–0.2)
BASOPHILS NFR BLD AUTO: 0.8 % — SIGNIFICANT CHANGE UP (ref 0–2)
BUN SERPL-MCNC: 5 MG/DL — LOW (ref 7–23)
CALCIUM SERPL-MCNC: 8.4 MG/DL — SIGNIFICANT CHANGE UP (ref 8.4–10.5)
CHLORIDE SERPL-SCNC: 109 MMOL/L — HIGH (ref 98–107)
CO2 SERPL-SCNC: 24 MMOL/L — SIGNIFICANT CHANGE UP (ref 22–31)
CREAT SERPL-MCNC: 0.95 MG/DL — SIGNIFICANT CHANGE UP (ref 0.5–1.3)
EOSINOPHIL # BLD AUTO: 0.29 K/UL — SIGNIFICANT CHANGE UP (ref 0–0.5)
EOSINOPHIL NFR BLD AUTO: 6.1 % — HIGH (ref 0–6)
GLUCOSE SERPL-MCNC: 96 MG/DL — SIGNIFICANT CHANGE UP (ref 70–99)
HBA1C BLD-MCNC: 6.3 % — HIGH (ref 4–5.6)
HCT VFR BLD CALC: 31.4 % — LOW (ref 34.5–45)
HGB BLD-MCNC: 10.4 G/DL — LOW (ref 11.5–15.5)
IMM GRANULOCYTES # BLD AUTO: 0.01 # — SIGNIFICANT CHANGE UP
IMM GRANULOCYTES NFR BLD AUTO: 0.2 % — SIGNIFICANT CHANGE UP (ref 0–1.5)
LYMPHOCYTES # BLD AUTO: 1.8 K/UL — SIGNIFICANT CHANGE UP (ref 1–3.3)
LYMPHOCYTES # BLD AUTO: 37.7 % — SIGNIFICANT CHANGE UP (ref 13–44)
MCHC RBC-ENTMCNC: 27.2 PG — SIGNIFICANT CHANGE UP (ref 27–34)
MCHC RBC-ENTMCNC: 33.1 % — SIGNIFICANT CHANGE UP (ref 32–36)
MCV RBC AUTO: 82.2 FL — SIGNIFICANT CHANGE UP (ref 80–100)
MONOCYTES # BLD AUTO: 0.33 K/UL — SIGNIFICANT CHANGE UP (ref 0–0.9)
MONOCYTES NFR BLD AUTO: 6.9 % — SIGNIFICANT CHANGE UP (ref 2–14)
NEUTROPHILS # BLD AUTO: 2.31 K/UL — SIGNIFICANT CHANGE UP (ref 1.8–7.4)
NEUTROPHILS NFR BLD AUTO: 48.3 % — SIGNIFICANT CHANGE UP (ref 43–77)
NRBC # FLD: 0 — SIGNIFICANT CHANGE UP
PLATELET # BLD AUTO: 185 K/UL — SIGNIFICANT CHANGE UP (ref 150–400)
PMV BLD: 11.3 FL — SIGNIFICANT CHANGE UP (ref 7–13)
POTASSIUM SERPL-MCNC: 4 MMOL/L — SIGNIFICANT CHANGE UP (ref 3.5–5.3)
POTASSIUM SERPL-SCNC: 4 MMOL/L — SIGNIFICANT CHANGE UP (ref 3.5–5.3)
RBC # BLD: 3.82 M/UL — SIGNIFICANT CHANGE UP (ref 3.8–5.2)
RBC # FLD: 15 % — HIGH (ref 10.3–14.5)
SODIUM SERPL-SCNC: 145 MMOL/L — SIGNIFICANT CHANGE UP (ref 135–145)
SPECIMEN SOURCE: SIGNIFICANT CHANGE UP
SPECIMEN SOURCE: SIGNIFICANT CHANGE UP
TSH SERPL-MCNC: 0.51 UIU/ML — SIGNIFICANT CHANGE UP (ref 0.27–4.2)
WBC # BLD: 4.78 K/UL — SIGNIFICANT CHANGE UP (ref 3.8–10.5)
WBC # FLD AUTO: 4.78 K/UL — SIGNIFICANT CHANGE UP (ref 3.8–10.5)

## 2017-07-31 RX ORDER — DIPHENHYDRAMINE HCL 50 MG
25 CAPSULE ORAL ONCE
Qty: 0 | Refills: 0 | Status: COMPLETED | OUTPATIENT
Start: 2017-07-31 | End: 2017-07-31

## 2017-07-31 RX ORDER — DIPHENHYDRAMINE HCL 50 MG
25 CAPSULE ORAL ONCE
Qty: 0 | Refills: 0 | Status: DISCONTINUED | OUTPATIENT
Start: 2017-07-31 | End: 2017-07-31

## 2017-07-31 RX ORDER — INSULIN LISPRO 100/ML
VIAL (ML) SUBCUTANEOUS EVERY 6 HOURS
Qty: 0 | Refills: 0 | Status: DISCONTINUED | OUTPATIENT
Start: 2017-07-31 | End: 2017-08-01

## 2017-07-31 RX ADMIN — Medication 1.25 MILLIGRAM(S): at 05:19

## 2017-07-31 RX ADMIN — DEXTROSE MONOHYDRATE, SODIUM CHLORIDE, AND POTASSIUM CHLORIDE 75 MILLILITER(S): 50; .745; 4.5 INJECTION, SOLUTION INTRAVENOUS at 11:59

## 2017-07-31 RX ADMIN — Medication 1.25 MILLIGRAM(S): at 17:11

## 2017-07-31 RX ADMIN — FAMOTIDINE 20 MILLIGRAM(S): 10 INJECTION INTRAVENOUS at 20:19

## 2017-07-31 RX ADMIN — Medication 1.25 MILLIGRAM(S): at 23:26

## 2017-07-31 RX ADMIN — ENOXAPARIN SODIUM 40 MILLIGRAM(S): 100 INJECTION SUBCUTANEOUS at 20:19

## 2017-07-31 RX ADMIN — Medication 100 GRAM(S): at 05:19

## 2017-07-31 RX ADMIN — Medication 100 GRAM(S): at 17:11

## 2017-07-31 RX ADMIN — MORPHINE SULFATE 4 MILLIGRAM(S): 50 CAPSULE, EXTENDED RELEASE ORAL at 05:35

## 2017-07-31 RX ADMIN — MORPHINE SULFATE 4 MILLIGRAM(S): 50 CAPSULE, EXTENDED RELEASE ORAL at 05:19

## 2017-07-31 RX ADMIN — MORPHINE SULFATE 4 MILLIGRAM(S): 50 CAPSULE, EXTENDED RELEASE ORAL at 16:00

## 2017-07-31 RX ADMIN — Medication 25 MILLIGRAM(S): at 06:10

## 2017-07-31 RX ADMIN — Medication 44 MICROGRAM(S): at 13:30

## 2017-07-31 RX ADMIN — Medication 1.25 MILLIGRAM(S): at 11:19

## 2017-07-31 RX ADMIN — MORPHINE SULFATE 4 MILLIGRAM(S): 50 CAPSULE, EXTENDED RELEASE ORAL at 15:37

## 2017-07-31 RX ADMIN — DEXTROSE MONOHYDRATE, SODIUM CHLORIDE, AND POTASSIUM CHLORIDE 75 MILLILITER(S): 50; .745; 4.5 INJECTION, SOLUTION INTRAVENOUS at 20:20

## 2017-07-31 NOTE — PHYSICAL THERAPY INITIAL EVALUATION ADULT - ACTIVE RANGE OF MOTION EXAMINATION, REHAB EVAL
bilateral  lower extremity Active ROM was WFL (within functional limits)/no Active ROM deficits were identified/bilateral upper extremity Active ROM was WFL (within functional limits)

## 2017-07-31 NOTE — PHYSICAL THERAPY INITIAL EVALUATION ADULT - PERTINENT HX OF CURRENT PROBLEM, REHAB EVAL
77 F presents to the ED with abdominal pain persisting for one month that has increased in severity and is associated with diarrhea.

## 2017-07-31 NOTE — PROGRESS NOTE ADULT - SUBJECTIVE AND OBJECTIVE BOX
Surgery Progress note      S: Abdominal pain improving. Reports rectal pain. No bowel movements.         Vital Signs Last 24 Hrs  T(C): 36.6 (31 Jul 2017 05:14), Max: 36.9 (30 Jul 2017 14:18)  T(F): 97.9 (31 Jul 2017 05:14), Max: 98.5 (30 Jul 2017 14:18)  HR: 70 (31 Jul 2017 05:14) (63 - 70)  BP: 147/84 (31 Jul 2017 05:14) (147/84 - 168/75)  BP(mean): --  RR: 20 (31 Jul 2017 05:14) (17 - 20)  SpO2: 95% (31 Jul 2017 05:14) (95% - 99%)    Physical Exam:  Gen: Awake and alert, NAD  Abd: Soft, nondistended. Still some pain on palpation, improving.  GREG: No bleeding or masses noted.

## 2017-07-31 NOTE — PROGRESS NOTE ADULT - ASSESSMENT
77F with HTN, DM DLD, hypothyroidism admitted for a Hinchey II sigmoid diverticulitis, presently undergoing non-operative therapy.    Plan:  - Cont non-op therapy: IV abx, NPO, IVF  - DVT ppx  - OOB  - PT consult

## 2017-08-01 LAB
BUN SERPL-MCNC: 6 MG/DL — LOW (ref 7–23)
CALCIUM SERPL-MCNC: 8.6 MG/DL — SIGNIFICANT CHANGE UP (ref 8.4–10.5)
CHLORIDE SERPL-SCNC: 107 MMOL/L — SIGNIFICANT CHANGE UP (ref 98–107)
CO2 SERPL-SCNC: 21 MMOL/L — LOW (ref 22–31)
CREAT SERPL-MCNC: 0.85 MG/DL — SIGNIFICANT CHANGE UP (ref 0.5–1.3)
GLUCOSE SERPL-MCNC: 118 MG/DL — HIGH (ref 70–99)
HCT VFR BLD CALC: 33.3 % — LOW (ref 34.5–45)
HGB BLD-MCNC: 11 G/DL — LOW (ref 11.5–15.5)
MAGNESIUM SERPL-MCNC: 2 MG/DL — SIGNIFICANT CHANGE UP (ref 1.6–2.6)
MCHC RBC-ENTMCNC: 26.8 PG — LOW (ref 27–34)
MCHC RBC-ENTMCNC: 33 % — SIGNIFICANT CHANGE UP (ref 32–36)
MCV RBC AUTO: 81 FL — SIGNIFICANT CHANGE UP (ref 80–100)
NRBC # FLD: 0 — SIGNIFICANT CHANGE UP
PHOSPHATE SERPL-MCNC: 2.6 MG/DL — SIGNIFICANT CHANGE UP (ref 2.5–4.5)
PLATELET # BLD AUTO: 215 K/UL — SIGNIFICANT CHANGE UP (ref 150–400)
PMV BLD: 10.6 FL — SIGNIFICANT CHANGE UP (ref 7–13)
POTASSIUM SERPL-MCNC: 3.9 MMOL/L — SIGNIFICANT CHANGE UP (ref 3.5–5.3)
POTASSIUM SERPL-SCNC: 3.9 MMOL/L — SIGNIFICANT CHANGE UP (ref 3.5–5.3)
RBC # BLD: 4.11 M/UL — SIGNIFICANT CHANGE UP (ref 3.8–5.2)
RBC # FLD: 14.6 % — HIGH (ref 10.3–14.5)
SODIUM SERPL-SCNC: 144 MMOL/L — SIGNIFICANT CHANGE UP (ref 135–145)
WBC # BLD: 4.98 K/UL — SIGNIFICANT CHANGE UP (ref 3.8–10.5)
WBC # FLD AUTO: 4.98 K/UL — SIGNIFICANT CHANGE UP (ref 3.8–10.5)

## 2017-08-01 RX ORDER — LOSARTAN POTASSIUM 100 MG/1
50 TABLET, FILM COATED ORAL DAILY
Qty: 0 | Refills: 0 | Status: DISCONTINUED | OUTPATIENT
Start: 2017-08-01 | End: 2017-08-03

## 2017-08-01 RX ORDER — POTASSIUM PHOSPHATE, MONOBASIC POTASSIUM PHOSPHATE, DIBASIC 236; 224 MG/ML; MG/ML
15 INJECTION, SOLUTION INTRAVENOUS ONCE
Qty: 0 | Refills: 0 | Status: COMPLETED | OUTPATIENT
Start: 2017-08-01 | End: 2017-08-01

## 2017-08-01 RX ORDER — LEVOTHYROXINE SODIUM 125 MCG
88 TABLET ORAL DAILY
Qty: 0 | Refills: 0 | Status: DISCONTINUED | OUTPATIENT
Start: 2017-08-01 | End: 2017-08-03

## 2017-08-01 RX ORDER — INSULIN LISPRO 100/ML
VIAL (ML) SUBCUTANEOUS AT BEDTIME
Qty: 0 | Refills: 0 | Status: DISCONTINUED | OUTPATIENT
Start: 2017-08-01 | End: 2017-08-03

## 2017-08-01 RX ORDER — OXYCODONE HYDROCHLORIDE 5 MG/1
5 TABLET ORAL EVERY 4 HOURS
Qty: 0 | Refills: 0 | Status: DISCONTINUED | OUTPATIENT
Start: 2017-08-01 | End: 2017-08-03

## 2017-08-01 RX ORDER — ACETAMINOPHEN 500 MG
650 TABLET ORAL EVERY 6 HOURS
Qty: 0 | Refills: 0 | Status: DISCONTINUED | OUTPATIENT
Start: 2017-08-01 | End: 2017-08-03

## 2017-08-01 RX ORDER — ATORVASTATIN CALCIUM 80 MG/1
40 TABLET, FILM COATED ORAL AT BEDTIME
Qty: 0 | Refills: 0 | Status: DISCONTINUED | OUTPATIENT
Start: 2017-08-01 | End: 2017-08-03

## 2017-08-01 RX ORDER — GABAPENTIN 400 MG/1
100 CAPSULE ORAL THREE TIMES A DAY
Qty: 0 | Refills: 0 | Status: DISCONTINUED | OUTPATIENT
Start: 2017-08-01 | End: 2017-08-03

## 2017-08-01 RX ORDER — INSULIN LISPRO 100/ML
VIAL (ML) SUBCUTANEOUS
Qty: 0 | Refills: 0 | Status: DISCONTINUED | OUTPATIENT
Start: 2017-08-01 | End: 2017-08-03

## 2017-08-01 RX ORDER — PANTOPRAZOLE SODIUM 20 MG/1
40 TABLET, DELAYED RELEASE ORAL
Qty: 0 | Refills: 0 | Status: DISCONTINUED | OUTPATIENT
Start: 2017-08-01 | End: 2017-08-03

## 2017-08-01 RX ORDER — OXYCODONE HYDROCHLORIDE 5 MG/1
10 TABLET ORAL EVERY 4 HOURS
Qty: 0 | Refills: 0 | Status: DISCONTINUED | OUTPATIENT
Start: 2017-08-01 | End: 2017-08-03

## 2017-08-01 RX ADMIN — Medication 100 GRAM(S): at 06:48

## 2017-08-01 RX ADMIN — Medication 44 MICROGRAM(S): at 09:00

## 2017-08-01 RX ADMIN — GABAPENTIN 100 MILLIGRAM(S): 400 CAPSULE ORAL at 21:09

## 2017-08-01 RX ADMIN — DEXTROSE MONOHYDRATE, SODIUM CHLORIDE, AND POTASSIUM CHLORIDE 75 MILLILITER(S): 50; .745; 4.5 INJECTION, SOLUTION INTRAVENOUS at 21:09

## 2017-08-01 RX ADMIN — Medication 1.25 MILLIGRAM(S): at 18:00

## 2017-08-01 RX ADMIN — Medication 100 GRAM(S): at 19:05

## 2017-08-01 RX ADMIN — ATORVASTATIN CALCIUM 40 MILLIGRAM(S): 80 TABLET, FILM COATED ORAL at 21:09

## 2017-08-01 RX ADMIN — POTASSIUM PHOSPHATE, MONOBASIC POTASSIUM PHOSPHATE, DIBASIC 62.5 MILLIMOLE(S): 236; 224 INJECTION, SOLUTION INTRAVENOUS at 14:17

## 2017-08-01 RX ADMIN — ENOXAPARIN SODIUM 40 MILLIGRAM(S): 100 INJECTION SUBCUTANEOUS at 19:05

## 2017-08-01 RX ADMIN — Medication 1.25 MILLIGRAM(S): at 11:15

## 2017-08-01 RX ADMIN — Medication 1.25 MILLIGRAM(S): at 05:16

## 2017-08-01 NOTE — PROGRESS NOTE ADULT - SUBJECTIVE AND OBJECTIVE BOX
78 y/o female w/ sigmoid diverticulitis + intramural abscess.  Abdominal pain still present, but improving since yesterday.  Denies passing flatus.        VITALS  T(C): 36.6 (08-01-17 @ 05:15), Max: 37 (08-01-17 @ 01:46)  HR: 64 (08-01-17 @ 05:15) (64 - 69)  BP: 154/69 (08-01-17 @ 05:15) (134/66 - 171/76)  BP(mean): --  RR: 16 (08-01-17 @ 05:15) (16 - 17)  SpO2: 100% (08-01-17 @ 05:15) (97% - 100%)  Wt(kg): --  CAPILLARY BLOOD GLUCOSE  115 (01 Aug 2017 05:36)  104 (31 Jul 2017 23:07)  113 (31 Jul 2017 17:17)  101 (31 Jul 2017 11:37)          Is/Os    07-31 @ 07:01  -  08-01 @ 07:00  --------------------------------------------------------  IN:    dextrose 5% + sodium chloride 0.45% with potassium chloride 20 mEq/L: 900 mL    IV PiggyBack: 50 mL  Total IN: 950 mL    OUT:    Voided: 1350 mL  Total OUT: 1350 mL    Total NET: -400 mL    PHYSICAL EXAM:  General: NAD, Lying in bed comfortably  Neuro: alert, oriented x3  GI/Abd: Soft, Non-tender to palpation, no distension.  Musculoskeletal: All 4 extremities moving spontaneously    MEDICATIONS (STANDING): cefoTEtan  IVPB 2 Gram(s) IV Intermittent every 12 hours  enalaprilat Injectable 1.25 milliGRAM(s) IV Push every 6 hours  dextrose 5% + sodium chloride 0.45% with potassium chloride 20 mEq/L 1000 milliLiter(s) IV Continuous <Continuous>  enoxaparin Injectable 40 milliGRAM(s) SubCutaneous <User Schedule>  famotidine Injectable 20 milliGRAM(s) IV Push <User Schedule>  levothyroxine Injectable 44 MICROGram(s) IV Push <User Schedule>  insulin lispro (HumaLOG) corrective regimen sliding scale   SubCutaneous every 6 hours    MEDICATIONS (PRN):morphine  - Injectable 4 milliGRAM(s) IV Push every 4 hours PRN Moderate to severe pain      LABS  CBC (08-01 @ 05:30)                              11.0<L>                         4.98    )----------------(  215        --    % Neutrophils, --    % Lymphocytes, ANC: --                                  33.3<L>  CBC (07-31 @ 05:49)                              10.4<L>                         4.78    )----------------(  185        48.3  % Neutrophils, 37.7  % Lymphocytes, ANC: 2.31                                31.4<L>    BMP (08-01 @ 05:30)             144     |  107     |  6<L>  		Ca++ --      Ca 8.6                ---------------------------------( 118<H>		Mg 2.0                3.9     |  21<L>   |  0.85  			Ph 2.6     BMP (07-31 @ 05:49)             145     |  109<H>  |  5<L>  		Ca++ --      Ca 8.4                ---------------------------------( 96    		Mg --                 4.0     |  24      |  0.95  			Ph --

## 2017-08-01 NOTE — PROGRESS NOTE ADULT - ASSESSMENT
ASSESSMENT  77F with HTN, DM DLD, hypothyroidism admitted for a Hinchey II sigmoid diverticulitis, presently undergoing non-operative therapy.    PLAN  - continue non-op treatment: IV abx, NPO, IV fluids  - Pain control with PO/IV medications.    - Continue home medications  - OOB, ambulate as tolerated  - continue chemical VTE ppx  - PT recs: no skilled PT needs when d/c'd to home  - continue serial abdominal exams    B-Team  [id number]

## 2017-08-02 ENCOUNTER — TRANSCRIPTION ENCOUNTER (OUTPATIENT)
Age: 77
End: 2017-08-02

## 2017-08-02 LAB
BUN SERPL-MCNC: 2 MG/DL — LOW (ref 7–23)
CALCIUM SERPL-MCNC: 9 MG/DL — SIGNIFICANT CHANGE UP (ref 8.4–10.5)
CHLORIDE SERPL-SCNC: 109 MMOL/L — HIGH (ref 98–107)
CO2 SERPL-SCNC: 21 MMOL/L — LOW (ref 22–31)
CREAT SERPL-MCNC: 0.76 MG/DL — SIGNIFICANT CHANGE UP (ref 0.5–1.3)
GLUCOSE SERPL-MCNC: 94 MG/DL — SIGNIFICANT CHANGE UP (ref 70–99)
HCT VFR BLD CALC: 35.1 % — SIGNIFICANT CHANGE UP (ref 34.5–45)
HGB BLD-MCNC: 11.4 G/DL — LOW (ref 11.5–15.5)
MAGNESIUM SERPL-MCNC: 2.1 MG/DL — SIGNIFICANT CHANGE UP (ref 1.6–2.6)
MCHC RBC-ENTMCNC: 25.8 PG — LOW (ref 27–34)
MCHC RBC-ENTMCNC: 32.5 % — SIGNIFICANT CHANGE UP (ref 32–36)
MCV RBC AUTO: 79.4 FL — LOW (ref 80–100)
NRBC # FLD: 0 — SIGNIFICANT CHANGE UP
PHOSPHATE SERPL-MCNC: 3.1 MG/DL — SIGNIFICANT CHANGE UP (ref 2.5–4.5)
PLATELET # BLD AUTO: 228 K/UL — SIGNIFICANT CHANGE UP (ref 150–400)
PMV BLD: 10.6 FL — SIGNIFICANT CHANGE UP (ref 7–13)
POTASSIUM SERPL-MCNC: 4 MMOL/L — SIGNIFICANT CHANGE UP (ref 3.5–5.3)
POTASSIUM SERPL-SCNC: 4 MMOL/L — SIGNIFICANT CHANGE UP (ref 3.5–5.3)
RBC # BLD: 4.42 M/UL — SIGNIFICANT CHANGE UP (ref 3.8–5.2)
RBC # FLD: 14.9 % — HIGH (ref 10.3–14.5)
SODIUM SERPL-SCNC: 146 MMOL/L — HIGH (ref 135–145)
WBC # BLD: 5.36 K/UL — SIGNIFICANT CHANGE UP (ref 3.8–10.5)
WBC # FLD AUTO: 5.36 K/UL — SIGNIFICANT CHANGE UP (ref 3.8–10.5)

## 2017-08-02 RX ADMIN — ATORVASTATIN CALCIUM 40 MILLIGRAM(S): 80 TABLET, FILM COATED ORAL at 21:13

## 2017-08-02 RX ADMIN — PANTOPRAZOLE SODIUM 40 MILLIGRAM(S): 20 TABLET, DELAYED RELEASE ORAL at 05:27

## 2017-08-02 RX ADMIN — OXYCODONE HYDROCHLORIDE 10 MILLIGRAM(S): 5 TABLET ORAL at 19:25

## 2017-08-02 RX ADMIN — LOSARTAN POTASSIUM 50 MILLIGRAM(S): 100 TABLET, FILM COATED ORAL at 05:27

## 2017-08-02 RX ADMIN — Medication 88 MICROGRAM(S): at 05:27

## 2017-08-02 RX ADMIN — ENOXAPARIN SODIUM 40 MILLIGRAM(S): 100 INJECTION SUBCUTANEOUS at 18:49

## 2017-08-02 RX ADMIN — Medication 100 GRAM(S): at 18:48

## 2017-08-02 RX ADMIN — Medication 100 GRAM(S): at 05:26

## 2017-08-02 RX ADMIN — OXYCODONE HYDROCHLORIDE 10 MILLIGRAM(S): 5 TABLET ORAL at 20:19

## 2017-08-02 RX ADMIN — GABAPENTIN 100 MILLIGRAM(S): 400 CAPSULE ORAL at 21:13

## 2017-08-02 RX ADMIN — GABAPENTIN 100 MILLIGRAM(S): 400 CAPSULE ORAL at 05:27

## 2017-08-02 RX ADMIN — GABAPENTIN 100 MILLIGRAM(S): 400 CAPSULE ORAL at 15:04

## 2017-08-02 NOTE — PROVIDER CONTACT NOTE (OTHER) - BACKGROUND
pt admitted for acute sigmoid diverticulitis
Patient is admitted for diverticulitis.

## 2017-08-02 NOTE — DISCHARGE NOTE ADULT - PLAN OF CARE
Patient now pain free and tolerating regular diet after bowel rest and IV fluids. Regular Diet as tolerated. No heavy lifting and strenuous activity. Okay to shower. Follow-up with Dr. Nj in 2-3 weeks and follow-up CT scan in 2-3 weeks. Please call to make an appointment to follow up with your primary care physician regarding your recent hospitalization. Regular Diet as tolerated. No heavy lifting and strenuous activity. Okay to shower. Follow-up with Dr. Nj in 1-2 weeks and follow-up CT scan in 1-2 weeks.

## 2017-08-02 NOTE — DISCHARGE NOTE ADULT - PATIENT PORTAL LINK FT
“You can access the [facility] Patient Portal, offered by [facility], by registering with the following website: [email or web address]”

## 2017-08-02 NOTE — PROVIDER CONTACT NOTE (OTHER) - REASON
Right leg pain
HTN
pt complaining of itching all over body, pt states she had the same thing happed in the ED and received PO Benadryl; pt received IV Flagyl, Cefotetan and IVP Morphine currently, in ED PO Benadryl given post IVP Morphine, unable to assess what is causing itching; pt denies SOB, itching throat or any acute distress
BP

## 2017-08-02 NOTE — DISCHARGE NOTE ADULT - CARE PLAN
Principal Discharge DX:	Diverticulitis of large intestine with abscess without bleeding  Goal:	Patient now pain free and tolerating regular diet after bowel rest and IV fluids.  Instructions for follow-up, activity and diet:	Regular Diet as tolerated. No heavy lifting and strenuous activity. Okay to shower. Follow-up with Dr. Nj in 2-3 weeks and follow-up CT scan in 2-3 weeks.  Secondary Diagnosis:	Hypertension  Instructions for follow-up, activity and diet:	Please call to make an appointment to follow up with your primary care physician regarding your recent hospitalization.  Secondary Diagnosis:	Dyslipidemia  Secondary Diagnosis:	Diabetes mellitus Principal Discharge DX:	Diverticulitis of large intestine with abscess without bleeding  Goal:	Patient now pain free and tolerating regular diet after bowel rest and IV fluids.  Instructions for follow-up, activity and diet:	Regular Diet as tolerated. No heavy lifting and strenuous activity. Okay to shower. Follow-up with Dr. Nj in 1-2 weeks and follow-up CT scan in 1-2 weeks.  Secondary Diagnosis:	Hypertension  Instructions for follow-up, activity and diet:	Please call to make an appointment to follow up with your primary care physician regarding your recent hospitalization.  Secondary Diagnosis:	Dyslipidemia  Secondary Diagnosis:	Diabetes mellitus

## 2017-08-02 NOTE — DISCHARGE NOTE ADULT - MEDICATION SUMMARY - MEDICATIONS TO TAKE
I will START or STAY ON the medications listed below when I get home from the hospital:    acetaminophen 325 mg oral tablet  -- 2 tab(s) by mouth every 6 hours, As needed, Mild Pain (1 - 3)  -- Indication: For Pain     losartan 50 mg oral tablet  -- 1 tab(s) by mouth once a day  -- Indication: For Hypertension    gabapentin 100 mg oral tablet  --  by mouth 3 times a day  -- Indication: For Pain     metFORMIN 500 mg oral tablet  -- 1 tab(s) by mouth 2 times a day  -- Indication: For Diabetes mellitus    atorvastatin 40 mg oral tablet  -- 1 tab(s) by mouth once a day  -- Indication: For Dyslipidemia    Linzess 290 mcg oral capsule  -- 1 cap(s) by mouth once a day  -- Indication: For GI motility     famotidine 20 mg oral tablet  -- 1 tab(s) by mouth 2 times a day  -- Indication: For GERD    amoxicillin-clavulanate 875 mg-125 mg oral tablet  -- 1 tab(s) by mouth every 12 hours  -- Indication: For Antibiotics for diverticulitis     omeprazole 40 mg oral delayed release capsule  -- 1 cap(s) by mouth once a day  -- Indication: For GERD    Synthroid 88 mcg (0.088 mg) oral tablet  -- 1 tab(s) by mouth once a day  -- Indication: For Hypothyroid     Vitamin D3 1000 intl units oral capsule  -- 1 cap(s) by mouth once a day  -- Indication: For Supplement

## 2017-08-02 NOTE — PROGRESS NOTE ADULT - SUBJECTIVE AND OBJECTIVE BOX
Team Surgery Progress Note     S: Patient resting comfortably on morning rounds. Abdominal pain improved. Denies nausea or vomiting. Passing flatus and BMs. No new symptoms reported.     MEDICATIONS  (STANDING):  cefoTEtan  IVPB 2 Gram(s) IV Intermittent every 12 hours  dextrose 5% + sodium chloride 0.45% with potassium chloride 20 mEq/L 1000 milliLiter(s) (75 mL/Hr) IV Continuous <Continuous>  enoxaparin Injectable 40 milliGRAM(s) SubCutaneous <User Schedule>  levothyroxine 88 MICROGram(s) Oral daily  insulin lispro (HumaLOG) corrective regimen sliding scale   SubCutaneous three times a day before meals  insulin lispro (HumaLOG) corrective regimen sliding scale   SubCutaneous at bedtime  atorvastatin 40 milliGRAM(s) Oral at bedtime  gabapentin 100 milliGRAM(s) Oral three times a day  losartan 50 milliGRAM(s) Oral daily  pantoprazole    Tablet 40 milliGRAM(s) Oral before breakfast    MEDICATIONS  (PRN):  acetaminophen   Tablet. 650 milliGRAM(s) Oral every 6 hours PRN Mild Pain (1 - 3)  oxyCODONE    IR 5 milliGRAM(s) Oral every 4 hours PRN Moderate Pain (4 - 6)  oxyCODONE    IR 10 milliGRAM(s) Oral every 4 hours PRN Severe Pain (7 - 10)      Physical Exam:    Afebrile, HR: 78, BP: 148/66, RR: 18, 95% on RA    08-01-17 @ 07:01  -  08-02-17 @ 07:00  --------------------------------------------------------  IN: 2000 mL / OUT: 1350 mL / NET: 650 mL      General: NAD, AOx3    Abdominal: Soft, minimally distended, non-tender       LABS:                        11.4   5.36  )-----------( 228      ( 02 Aug 2017 05:30 )             35.1     08-02    146<H>  |  109<H>  |  2<L>  ----------------------------<  94  4.0   |  21<L>  |  0.76    Ca    9.0      02 Aug 2017 05:30  Phos  3.1     08-02  Mg     2.1     08-02

## 2017-08-02 NOTE — DISCHARGE NOTE ADULT - CARE PROVIDER_API CALL
Ash Nj), ColonRectal Surgery; Surgery  [address] [place] [id number]  Phone: [phone]  Fax: [phone]

## 2017-08-02 NOTE — PROGRESS NOTE ADULT - ASSESSMENT
A/P: 77F w/ HTN, HLD, DM2 here with Diverticulitis w/ intramural abscess  - NPO, IV fluids  - IV abx as written  - Pain control with PO/IV medications after abdominal exams by surgical team  - Continue home medications  - OOB, ambulate as tolerated  - continue chemical VTE ppx  - OOB / ambulate  - continue serial abdominal exams      B team surgery   [id number]

## 2017-08-02 NOTE — DISCHARGE NOTE ADULT - HOSPITAL COURSE
Patient was admitted with the diagnosis of diverticulitis and placed on bowel rest.  IV antibiotics were started and serial abdominal exams were done.  Once the patient's abdominal pain began to improve, her diet was then restarted and slowly advanced as tolerated.  As of HD # 4, the patient's pain had resolved and she was tolerating a regular diet without nausea, vomiting, or abdominal pain.  She was voiding well and ambulating without difficulty at the time of discharge.

## 2017-08-02 NOTE — PROVIDER CONTACT NOTE (OTHER) - ACTION/TREATMENT ORDERED:
hold IV Morphine and will order Benadryl
MD aware, no new orders.  Will continue to monitor.
MD aware, states to recheck BP in ~1 hour and give vasotec at that time as ordered if still elevated.  Will continue to monitor.
MD to come assess patient.  Will continue to monitor patient.

## 2017-08-02 NOTE — PROVIDER CONTACT NOTE (OTHER) - ASSESSMENT
pt denies SOB, itching throat or any acute distress
Patient resting comfortably offering no complaints.  NAD noted.  Due for vasotec IVP at 0000.
Patient resting comfortably offering no complaints.  NAD noted.  Patient restarted on home meds, due for cozaar at 0600.
Patient states "it feels like its breaking."  Patient currently laying in bed, advised not to ambulate alone.  Palpable pedal pulses noted.  RLE warm to touch, +sensation, denies numbness/tingling.  Patient states she has had RLE pain in the past, but the pain has not been this strong.

## 2017-08-02 NOTE — PROGRESS NOTE ADULT - ATTENDING COMMENTS
Perforated diverticulitis w/ intramural abscess  -Abx  -Clears to LRD  -Pain significantly improved  -Dispo planning  -Outpt f/u w/ repeat CT to evaluate resolution of abscess

## 2017-08-03 VITALS
RESPIRATION RATE: 18 BRPM | SYSTOLIC BLOOD PRESSURE: 132 MMHG | HEART RATE: 83 BPM | DIASTOLIC BLOOD PRESSURE: 74 MMHG | OXYGEN SATURATION: 97 % | TEMPERATURE: 99 F

## 2017-08-03 RX ORDER — ACETAMINOPHEN 500 MG
2 TABLET ORAL
Qty: 0 | Refills: 0 | DISCHARGE
Start: 2017-08-03

## 2017-08-03 RX ADMIN — Medication 100 GRAM(S): at 05:13

## 2017-08-03 RX ADMIN — PANTOPRAZOLE SODIUM 40 MILLIGRAM(S): 20 TABLET, DELAYED RELEASE ORAL at 07:17

## 2017-08-03 RX ADMIN — Medication 88 MICROGRAM(S): at 05:12

## 2017-08-03 RX ADMIN — GABAPENTIN 100 MILLIGRAM(S): 400 CAPSULE ORAL at 05:13

## 2017-08-03 RX ADMIN — OXYCODONE HYDROCHLORIDE 10 MILLIGRAM(S): 5 TABLET ORAL at 01:54

## 2017-08-03 RX ADMIN — LOSARTAN POTASSIUM 50 MILLIGRAM(S): 100 TABLET, FILM COATED ORAL at 05:12

## 2017-08-03 RX ADMIN — OXYCODONE HYDROCHLORIDE 10 MILLIGRAM(S): 5 TABLET ORAL at 01:04

## 2017-08-03 NOTE — PROGRESS NOTE ADULT - ASSESSMENT
A/P: 77F with resolving Diverticulitis  - regular diet as tolerated  - OOB / Ambulate  - discharge planning for today with 1 week Augmentin.

## 2017-08-04 LAB
BACTERIA BLD CULT: SIGNIFICANT CHANGE UP
BACTERIA BLD CULT: SIGNIFICANT CHANGE UP

## 2017-08-10 ENCOUNTER — APPOINTMENT (OUTPATIENT)
Dept: COLORECTAL SURGERY | Facility: CLINIC | Age: 77
End: 2017-08-10
Payer: MEDICAID

## 2017-08-10 VITALS
TEMPERATURE: 97.6 F | OXYGEN SATURATION: 97 % | HEART RATE: 78 BPM | BODY MASS INDEX: 35.28 KG/M2 | SYSTOLIC BLOOD PRESSURE: 117 MMHG | WEIGHT: 175 LBS | HEIGHT: 59 IN | DIASTOLIC BLOOD PRESSURE: 74 MMHG

## 2017-08-10 DIAGNOSIS — K57.92 DIVERTICULITIS OF INTESTINE, PART UNSPECIFIED, W/OUT PERFORATION OR ABSCESS W/OUT BLEEDING: ICD-10-CM

## 2017-08-10 DIAGNOSIS — K59.00 CONSTIPATION, UNSPECIFIED: ICD-10-CM

## 2017-08-10 DIAGNOSIS — K65.1 PERITONEAL ABSCESS: ICD-10-CM

## 2017-08-10 PROCEDURE — 99213 OFFICE O/P EST LOW 20 MIN: CPT

## 2017-08-14 ENCOUNTER — APPOINTMENT (OUTPATIENT)
Dept: CT IMAGING | Facility: IMAGING CENTER | Age: 77
End: 2017-08-14
Payer: MEDICAID

## 2017-08-14 ENCOUNTER — OUTPATIENT (OUTPATIENT)
Dept: OUTPATIENT SERVICES | Facility: HOSPITAL | Age: 77
LOS: 1 days | End: 2017-08-14
Payer: COMMERCIAL

## 2017-08-14 DIAGNOSIS — Z98.89 OTHER SPECIFIED POSTPROCEDURAL STATES: Chronic | ICD-10-CM

## 2017-08-14 DIAGNOSIS — Z90.49 ACQUIRED ABSENCE OF OTHER SPECIFIED PARTS OF DIGESTIVE TRACT: Chronic | ICD-10-CM

## 2017-08-14 DIAGNOSIS — Z00.8 ENCOUNTER FOR OTHER GENERAL EXAMINATION: ICD-10-CM

## 2017-08-14 PROCEDURE — 74177 CT ABD & PELVIS W/CONTRAST: CPT

## 2017-08-14 PROCEDURE — 74177 CT ABD & PELVIS W/CONTRAST: CPT | Mod: 26

## 2017-08-15 PROBLEM — Z00.00 ENCOUNTER FOR PREVENTIVE HEALTH EXAMINATION: Noted: 2017-08-15

## 2017-08-17 ENCOUNTER — APPOINTMENT (OUTPATIENT)
Dept: HOME HEALTH SERVICES | Facility: HOME HEALTH | Age: 77
End: 2017-08-17

## 2019-04-12 NOTE — PATIENT PROFILE ADULT. - AS SC [facility] NUTRITION
If you have any questions after today's visit, please feel free to send us a message through [employer] ([email or web address] or call the clinic at [phone].    We are committed to providing our patients with their test results in a timely manner. If you have access to [employer], you will receive your results within 5 to 7 days. If your results are normal, a member of our office may call you or you may receive a letter in the mail within 10 to 15 days of your testing. If your results have something additional to discuss, a member of our office will contact you by phone. If at any time you have questions related your results, please feel free to call our office at [phone]     (3) adequate

## 2019-05-01 PROBLEM — I10 ESSENTIAL (PRIMARY) HYPERTENSION: Chronic | Status: ACTIVE | Noted: 2017-07-30

## 2019-05-09 ENCOUNTER — NON-APPOINTMENT (OUTPATIENT)
Age: 79
End: 2019-05-09

## 2019-05-09 ENCOUNTER — APPOINTMENT (OUTPATIENT)
Dept: OPHTHALMOLOGY | Facility: CLINIC | Age: 79
End: 2019-05-09
Payer: MEDICARE

## 2019-05-09 PROCEDURE — 92004 COMPRE OPH EXAM NEW PT 1/>: CPT

## 2019-05-09 PROCEDURE — 92286 ANT SGM IMG I&R SPECLR MIC: CPT

## 2019-05-09 PROCEDURE — 92134 CPTRZ OPH DX IMG PST SGM RTA: CPT

## 2019-05-17 NOTE — ED PROVIDER NOTE - OBJECTIVE STATEMENT
76 yo F with PMH of HTN, Hypothyroidism, HLD, DM, diverticulitis p/w abdominal pain for 1 month. Patient reports progressive diffuse abdominal pain that is worse in lower abd than upper abdomen. The pain also radiates to the back. Patient was seen by GI 2 weeks ago and was prescribed pepto bismul for colitis with no relief in pain. The pain is worsened by any PO intake. Patient reports EGD and colonoscopy 1 year ago that noted diverticulosis. She takes naproxen twice a week. No hematochezia, melena, diarrhea, SOB, palpitation, fever/chill, n/v. +constipation.
17-May-2019 04:48

## 2019-05-30 ENCOUNTER — APPOINTMENT (OUTPATIENT)
Dept: OPHTHALMOLOGY | Facility: CLINIC | Age: 79
End: 2019-05-30
Payer: MEDICAID

## 2019-05-30 ENCOUNTER — NON-APPOINTMENT (OUTPATIENT)
Age: 79
End: 2019-05-30

## 2019-05-30 PROCEDURE — 66821 AFTER CATARACT LASER SURGERY: CPT | Mod: LT

## 2019-05-30 PROCEDURE — 92133 CPTRZD OPH DX IMG PST SGM ON: CPT

## 2019-05-30 PROCEDURE — 92083 EXTENDED VISUAL FIELD XM: CPT

## 2019-05-30 PROCEDURE — ZZZZZ: CPT

## 2019-07-02 ENCOUNTER — APPOINTMENT (OUTPATIENT)
Dept: OPHTHALMOLOGY | Facility: CLINIC | Age: 79
End: 2019-07-02
Payer: MEDICAID

## 2019-07-02 ENCOUNTER — NON-APPOINTMENT (OUTPATIENT)
Age: 79
End: 2019-07-02

## 2019-07-02 PROCEDURE — 66821 AFTER CATARACT LASER SURGERY: CPT | Mod: RT,79

## 2019-07-02 PROCEDURE — ZZZZZ: CPT

## 2019-09-19 ENCOUNTER — APPOINTMENT (OUTPATIENT)
Dept: OPHTHALMOLOGY | Facility: CLINIC | Age: 79
End: 2019-09-19
Payer: MEDICARE

## 2019-09-19 ENCOUNTER — NON-APPOINTMENT (OUTPATIENT)
Age: 79
End: 2019-09-19

## 2019-09-19 PROCEDURE — 92015 DETERMINE REFRACTIVE STATE: CPT

## 2019-09-19 PROCEDURE — 92134 CPTRZ OPH DX IMG PST SGM RTA: CPT

## 2019-09-19 PROCEDURE — 99024 POSTOP FOLLOW-UP VISIT: CPT

## 2019-10-08 ENCOUNTER — APPOINTMENT (OUTPATIENT)
Dept: OPHTHALMOLOGY | Facility: CLINIC | Age: 79
End: 2019-10-08

## 2019-10-17 ENCOUNTER — NON-APPOINTMENT (OUTPATIENT)
Age: 79
End: 2019-10-17

## 2019-10-17 ENCOUNTER — APPOINTMENT (OUTPATIENT)
Dept: OPHTHALMOLOGY | Facility: CLINIC | Age: 79
End: 2019-10-17
Payer: MEDICARE

## 2019-10-17 PROCEDURE — 92014 COMPRE OPH EXAM EST PT 1/>: CPT

## 2020-04-21 ENCOUNTER — APPOINTMENT (OUTPATIENT)
Dept: OPHTHALMOLOGY | Facility: CLINIC | Age: 80
End: 2020-04-21
Payer: MEDICARE

## 2020-04-21 ENCOUNTER — NON-APPOINTMENT (OUTPATIENT)
Age: 80
End: 2020-04-21

## 2020-04-21 PROCEDURE — 99213 OFFICE O/P EST LOW 20 MIN: CPT | Mod: 95

## 2020-04-23 ENCOUNTER — APPOINTMENT (OUTPATIENT)
Dept: OPHTHALMOLOGY | Facility: CLINIC | Age: 80
End: 2020-04-23

## 2020-07-01 ENCOUNTER — NON-APPOINTMENT (OUTPATIENT)
Age: 80
End: 2020-07-01

## 2020-07-01 ENCOUNTER — APPOINTMENT (OUTPATIENT)
Dept: OPHTHALMOLOGY | Facility: CLINIC | Age: 80
End: 2020-07-01
Payer: MEDICARE

## 2020-07-01 PROCEDURE — 92014 COMPRE OPH EXAM EST PT 1/>: CPT

## 2020-07-01 PROCEDURE — 92134 CPTRZ OPH DX IMG PST SGM RTA: CPT

## 2020-09-15 NOTE — PROGRESS NOTE ADULT - SUBJECTIVE AND OBJECTIVE BOX
B Team Surgery Progress Note     S: Patient resting comfortably on morning rounds. No abdominal pain at this point. Tolerating a regular diet - no nausea or vomiting. Passing flatus. No new symptoms reported.     MEDICATIONS  (STANDING):  enoxaparin Injectable 40 milliGRAM(s) SubCutaneous <User Schedule>  levothyroxine 88 MICROGram(s) Oral daily  insulin lispro (HumaLOG) corrective regimen sliding scale   SubCutaneous three times a day before meals  insulin lispro (HumaLOG) corrective regimen sliding scale   SubCutaneous at bedtime  atorvastatin 40 milliGRAM(s) Oral at bedtime  gabapentin 100 milliGRAM(s) Oral three times a day  losartan 50 milliGRAM(s) Oral daily  pantoprazole    Tablet 40 milliGRAM(s) Oral before breakfast  amoxicillin  875 milliGRAM(s)/clavulanate 1 Tablet(s) Oral every 12 hours    MEDICATIONS  (PRN):  acetaminophen   Tablet. 650 milliGRAM(s) Oral every 6 hours PRN Mild Pain (1 - 3)  oxyCODONE    IR 5 milliGRAM(s) Oral every 4 hours PRN Moderate Pain (4 - 6)  oxyCODONE    IR 10 milliGRAM(s) Oral every 4 hours PRN Severe Pain (7 - 10)      Physical Exam:    Afebrile, BP: 132/74, HR: 83, RR: 18, 97% RA    08-02-17 @ 07:01  -  08-03-17 @ 07:00  --------------------------------------------------------  IN: 630 mL / OUT: 1450 mL / NET: -820 mL    08-03-17 @ 07:01  -  08-03-17 @ 11:07  --------------------------------------------------------  IN: 240 mL / OUT: 0 mL / NET: 240 mL      General: NAD, AOx3    Abdominal: Soft, non-distended, non-tender Cyclophosphamide Pregnancy And Lactation Text: This medication is Pregnancy Category D and it isn't considered safe during pregnancy. This medication is excreted in breast milk.

## 2021-07-12 NOTE — ED ADULT NURSE NOTE - DATE/TIME OF ACCEPTANCE
29-Jul-2017 17:12 [General Appearance - Well Developed] : well developed [Normal Appearance] : normal appearance [Well Groomed] : well groomed [General Appearance - Well Nourished] : well nourished [No Deformities] : no deformities [General Appearance - In No Acute Distress] : no acute distress [Normal Conjunctiva] : the conjunctiva exhibited no abnormalities [Eyelids - No Xanthelasma] : the eyelids demonstrated no xanthelasmas [Normal Oropharynx] : normal oropharynx [Neck Appearance] : the appearance of the neck was normal [Neck Cervical Mass (___cm)] : no neck mass was observed [Jugular Venous Distention Increased] : there was no jugular-venous distention [Thyroid Diffuse Enlargement] : the thyroid was not enlarged [Thyroid Nodule] : there were no palpable thyroid nodules [Heart Rate And Rhythm] : heart rate and rhythm were normal [Heart Sounds] : normal S1 and S2 [Murmurs] : no murmurs present [Arterial Pulses Normal] : the arterial pulses were normal [Edema] : no peripheral edema present [Veins - Varicosity Changes] : no varicosital changes were noted in the lower extremities [Respiration, Rhythm And Depth] : normal respiratory rhythm and effort [Exaggerated Use Of Accessory Muscles For Inspiration] : no accessory muscle use [Auscultation Breath Sounds / Voice Sounds] : lungs were clear to auscultation bilaterally [Chest Palpation] : palpation of the chest revealed no abnormalities [Lungs Percussion] : the lungs were normal to percussion [Bowel Sounds] : normal bowel sounds [Abdomen Soft] : soft [Abdomen Tenderness] : non-tender [Abdomen Mass (___ Cm)] : no abdominal mass palpated [Abnormal Walk] : normal gait [Gait - Sufficient For Exercise Testing] : the gait was sufficient for exercise testing [Nail Clubbing] : no clubbing of the fingernails [Cyanosis, Localized] : no localized cyanosis [Petechial Hemorrhages (___cm)] : no petechial hemorrhages [Skin Color & Pigmentation] : normal skin color and pigmentation [] : no rash [No Venous Stasis] : no venous stasis [Skin Lesions] : no skin lesions [No Skin Ulcers] : no skin ulcer [No Xanthoma] : no  xanthoma was observed [Deep Tendon Reflexes (DTR)] : deep tendon reflexes were 2+ and symmetric [Sensation] : the sensory exam was normal to light touch and pinprick [No Focal Deficits] : no focal deficits [FreeTextEntry1] : Cleared wheeze  rhonchi from hospital visit

## 2022-05-31 ENCOUNTER — APPOINTMENT (OUTPATIENT)
Dept: OPHTHALMOLOGY | Facility: CLINIC | Age: 82
End: 2022-05-31

## 2022-08-15 ENCOUNTER — APPOINTMENT (OUTPATIENT)
Dept: OPHTHALMOLOGY | Facility: CLINIC | Age: 82
End: 2022-08-15

## 2022-12-21 NOTE — PATIENT PROFILE ADULT. - MEDICATIONS BROUGHT TO HOSPITAL, PROFILE
EMERGENCY DEPARTMENT ENCOUNTER      Room Number: 06/06    History is provided by the patient, no translation services needed    HPI:    Chief complaint: Flank pain    Location: Left    Quality/Severity: 10 out of 10 severity described as a stabbing pain    Timing/Duration: Onset was this morning suddenly at 9 AM    Modifying Factors: She cannot identify any modifying factors    Associated Symptoms: She denies nausea, vomiting, fever, chills, hematuria    Narrative: Pt is a 53 y.o. female who presents complaining of sudden onset of severe left flank pain that began today.  She states she has had this pain in the past and diagnosed with a urinary tract infection.  She denies any urgency, frequency, dysuria, suprapubic pain, abdominal pain.  She has significant left flank pain.  She is alert, oriented, no acute distress.  She does appear very uncomfortable.      PMD: Provider, No Known    REVIEW OF SYSTEMS  Review of Systems   Constitutional: Negative for activity change, appetite change, fatigue and fever.   Respiratory: Negative for cough and shortness of breath.    Cardiovascular: Negative for chest pain, palpitations and leg swelling.   Gastrointestinal: Negative for abdominal distention, abdominal pain, diarrhea, nausea and vomiting.   Genitourinary: Positive for flank pain. Negative for decreased urine volume, difficulty urinating, dysuria, frequency, hematuria, pelvic pain, urgency, vaginal bleeding, vaginal discharge and vaginal pain.   Musculoskeletal: Positive for back pain. Negative for arthralgias, gait problem, joint swelling and myalgias.   Allergic/Immunologic: Negative for immunocompromised state.   Neurological: Negative for dizziness, tremors, weakness, light-headedness and headaches.   Hematological: Negative for adenopathy. Does not bruise/bleed easily.   Psychiatric/Behavioral: Negative for agitation, behavioral problems and confusion.         PAST MEDICAL HISTORY  Active Ambulatory Problems      Diagnosis Date Noted   • Colon wall thickening 11/09/2017   • Diverticulitis of large intestine without perforation or abscess without bleeding 11/09/2017     Resolved Ambulatory Problems     Diagnosis Date Noted   • No Resolved Ambulatory Problems     Past Medical History:   Diagnosis Date   • Colitis    • IBS (irritable bowel syndrome)        PAST SURGICAL HISTORY  Past Surgical History:   Procedure Laterality Date   • BREAST AUGMENTATION Bilateral    • COLONOSCOPY N/A 1992   • COLONOSCOPY N/A 11/20/2017    Procedure: COLONOSCOPY TO CECUM;  Surgeon: Lauren Agotso MD;  Location: [facility] ENDOSCOPY;  Service:        FAMILY HISTORY  History reviewed. No pertinent family history.    SOCIAL HISTORY  Social History     Socioeconomic History   • Marital status:    Tobacco Use   • Smoking status: Never   • Smokeless tobacco: Never   Substance and Sexual Activity   • Alcohol use: Yes     Alcohol/week: 2.0 standard drinks     Types: 2 Glasses of wine per week   • Drug use: No   • Sexual activity: Defer       ALLERGIES  Patient has no known allergies.      Current Facility-Administered Medications:   •  sodium chloride 0.9 % flush 10 mL, 10 mL, Intravenous, PRN, Emani West, APRN    Current Outpatient Medications:   •  cephalexin (KEFLEX) 500 MG capsule, Take 1 capsule by mouth 2 (Two) Times a Day for 7 days., Disp: 14 capsule, Rfl: 0  •  NON FORMULARY, Birth control daily, Disp: , Rfl:   •  phenazopyridine (PYRIDIUM) 200 MG tablet, Take 1 tablet by mouth 3 (Three) Times a Day As Needed for Bladder Spasms for up to 2 days., Disp: 6 tablet, Rfl: 0    PHYSICAL EXAM  ED Triage Vitals   Temp Heart Rate Resp BP SpO2   12/21/22 1641 12/21/22 1638 12/21/22 1638 12/21/22 1641 12/21/22 1638   98.3 °F (36.8 °C) 66 18 108/66 97 %      Temp src Heart Rate Source Patient Position BP Location FiO2 (%)   -- 12/21/22 1638 12/21/22 1641 12/21/22 1641 --    Monitor Sitting Left arm        Physical Exam  Vitals and nursing  note reviewed.   Constitutional:       General: She is not in acute distress.     Appearance: Normal appearance. She is normal weight. She is not ill-appearing, toxic-appearing or diaphoretic.   HENT:      Head: Normocephalic and atraumatic.      Nose: Nose normal.      Mouth/Throat:      Mouth: Mucous membranes are moist.      Pharynx: Oropharynx is clear.   Eyes:      Extraocular Movements: Extraocular movements intact.      Conjunctiva/sclera: Conjunctivae normal.   Cardiovascular:      Rate and Rhythm: Normal rate and regular rhythm.      Pulses: Normal pulses.      Heart sounds: Normal heart sounds. No murmur heard.  Pulmonary:      Effort: Pulmonary effort is normal.      Breath sounds: Normal breath sounds. No wheezing.   Abdominal:      General: Abdomen is flat. Bowel sounds are normal. There is no distension.      Tenderness: There is no abdominal tenderness. There is left CVA tenderness.   Musculoskeletal:         General: Normal range of motion.      Cervical back: Normal range of motion and neck supple. No rigidity or tenderness.   Skin:     General: Skin is warm and dry.      Capillary Refill: Capillary refill takes less than 2 seconds.   Neurological:      General: No focal deficit present.      Mental Status: She is alert and oriented to person, place, and time.   Psychiatric:         Mood and Affect: Mood normal.         Behavior: Behavior normal.           LAB RESULTS  Lab Results (last 24 hours)     Procedure Component Value Units Date/Time    Urinalysis without microscopic (no culture) - Urine, Clean Catch [[id number]]  (Abnormal) Collected: 12/21/22 1650    Specimen: Urine, Clean Catch Updated: 12/21/22 1656     Color, UA Yellow     Appearance, UA Clear     pH, UA 7.0     Specific Gravity, UA 1.015     Glucose, UA Negative     Ketones, UA Negative     Bilirubin, UA Negative     Blood, UA Negative     Protein, UA Negative     Leuk Esterase, UA Trace     Nitrite, UA Positive     Urobilinogen, UA 0.2  E.U./dL    Urinalysis With Culture If Indicated - Urine, Clean Catch [[id number]]  (Abnormal) Collected: 12/21/22 1650    Specimen: Urine, Clean Catch Updated: 12/21/22 1747     Color, UA Yellow     Appearance, UA Clear     pH, UA 7.0     Specific Gravity, UA 1.015     Glucose, UA Negative     Ketones, UA Negative     Bilirubin, UA Negative     Blood, UA Negative     Protein, UA Negative     Leuk Esterase, UA Trace     Nitrite, UA Positive     Urobilinogen, UA 0.2 E.U./dL    Narrative:      In absence of clinical symptoms, the presence of pyuria, bacteria, and/or nitrites on the urinalysis result does not correlate with infection.    Urinalysis, Microscopic Only - Urine, Clean Catch [[id number]] Collected: 12/21/22 1650    Specimen: Urine, Clean Catch Updated: 12/21/22 1746    Urine Culture - Urine, Urine, Clean Catch [[id number]] Collected: 12/21/22 1650    Specimen: Urine, Clean Catch Updated: 12/21/22 1746    CBC & Differential [[id number]]  (Normal) Collected: 12/21/22 1747    Specimen: Blood Updated: 12/21/22 1801    Narrative:      The following orders were created for panel order CBC & Differential.  Procedure                               Abnormality         Status                     ---------                               -----------         ------                     CBC Auto Differential[[id number]]        Normal              Final result                 Please view results for these tests on the individual orders.    Comprehensive Metabolic Panel [[id number]] Collected: 12/21/22 1747    Specimen: Blood Updated: 12/21/22 1822     Glucose 97 mg/dL      BUN 13 mg/dL      Creatinine 0.81 mg/dL      Sodium 141 mmol/L      Potassium 3.7 mmol/L      Chloride 104 mmol/L      CO2 26.3 mmol/L      Calcium 9.4 mg/dL      Total Protein 7.3 g/dL      Albumin 5.08 g/dL      ALT (SGPT) 15 U/L      AST (SGOT) 16 U/L      Alkaline Phosphatase 83 U/L      Total Bilirubin 0.4 mg/dL      Globulin 2.2 gm/dL      A/G Ratio 2.3  g/dL      BUN/Creatinine Ratio 16.0     Anion Gap 10.7 mmol/L      eGFR 85.9 mL/min/1.73      Comment: National Kidney Foundation and American Society of Nephrology (ASN) Task Force recommended calculation based on the Chronic Kidney Disease Epidemiology Collaboration (CKD-EPI) equation refit without adjustment for race.       Narrative:      GFR Normal >60  Chronic Kidney Disease <60  Kidney Failure <15      CBC Auto Differential [[id number]]  (Normal) Collected: 12/21/22 1747    Specimen: Blood Updated: 12/21/22 1801     WBC 6.31 10*3/mm3      RBC 4.16 10*6/mm3      Hemoglobin 12.6 g/dL      Hematocrit 38.1 %      MCV 91.6 fL      MCH 30.3 pg      MCHC 33.1 g/dL      RDW 12.3 %      RDW-SD 42.0 fl      MPV 9.5 fL      Platelets 275 10*3/mm3      Neutrophil % 63.4 %      Lymphocyte % 25.8 %      Monocyte % 6.8 %      Eosinophil % 3.2 %      Basophil % 0.6 %      Immature Grans % 0.2 %      Neutrophils, Absolute 4.00 10*3/mm3      Lymphocytes, Absolute 1.63 10*3/mm3      Monocytes, Absolute 0.43 10*3/mm3      Eosinophils, Absolute 0.20 10*3/mm3      Basophils, Absolute 0.04 10*3/mm3      Immature Grans, Absolute 0.01 10*3/mm3             I ordered the above labs and reviewed the results    RADIOLOGY  CT Abdomen Pelvis Stone Protocol    Result Date: 12/21/2022  CT ABDOMEN PELVIS STONE PROTOCOL-  Date of Exam: 12/21/2022 6:22 PM  Indication: Flank pain, kidney stone suspected.  Comparison: None available.  Technique: Contiguous axial CT images were obtained from the lung bases to the pubic symphysis without contrast. Sagittal and coronal reconstructions were performed.  Automated exposure control and iterative reconstruction methods were used.  FINDINGS:  Lower Thorax: No acute process identified.  Liver: Normal size and density. No focal lesions identified.  Gallbladder: The gallbladder appears normal in size with no evidence of radiopaque gallstones. The biliary tree is nondilated.  Pancreas: No focal masses.  No  pancreatic duct dilation.  Spleen: Spleen is normal size.  No focal splenic lesions.  Adrenal glands: Unremarkable.  Kidneys: The kidneys appear normal in size. No evidence of nephrolithiasis. No evidence of hydronephrosis. No suspicious focal lesions identified.  Retroperitoneum/vascular: No retroperitoneal adenopathy identified. No aneurysmal dilatation of the vascular structures.  Stomach and Bowel: There is a large amount of stool throughout the colon with stool to the cecum. These findings are consistent with constipation. The appendix is normal. The small bowel is normal. There is no acute inflammatory change.  Bladder: The bladder appears unremarkable.  Pelvic Organs: No acute process identified.  Osseous structures: No aggressive focal lytic or sclerotic osseous lesions. No acute osseous abnormality.       Stool throughout the colon consistent with constipation.    Electronically Signed By-Radu Miller MD On:12/21/2022 6:50 PM This report was finalized on [id number] by  Radu Miller MD.      I ordered the above radiologic testing and reviewed the results    PROCEDURES  Procedures      PROGRESS AND CONSULTS  ED Course as of 12/21/22 1914   Wed Dec 21, 2022   1700 Patient has nitrate positive. [VT]   1713 Discussed with patient concerns for pyelonephritis, renal calculi, urinary tract infection.  Patient has no urinary symptoms other than left flank pain.  Left flank pain is significant.  Patient reports the pain is so severe that it makes her feel like she is going to pass out. [VT]   1733 Discussed with patient the options of proceeding with laboratory studies as well as imaging to further evaluate the cause of her pain.  Patient agrees with plan of care and does wish to proceed. [VT]   1806 White blood cells are 6.31.  CBC is unremarkable. [VT]   1828 CMP is unremarkable. [VT]   1854 IMPRESSION:  Stool throughout the colon consistent with constipation.           Electronically Signed By-Radu Miller MD  On:12/21/2022 6:50 PM  This report was finalized on [id number] by  Radu Miller MD. [VT]   1854 CT of the abdomen does not reveal any evidence for renal calculi or other abnormality [VT]   1854 Patient will be discharged home with oral antibiotics for urinary tract infection. [VT]      ED Course User Index  [VT] Emani West, NILDA           MEDICAL DECISION MAKING    MDM     Thank you for letting us care for you today.  You have been diagnosed with an acute urinary tract infection.  You have been given a prescription for oral antibiotics.  Please complete the antibiotics even if you begin to feel better.  In addition, you have been given referral to [facility].  Please call their offices tomorrow to schedule an appointment.  Your urine has been sent for culture and we will contact you with any abnormal findings.  Should you notice worsening of your pain, fever, inability to urinate, or other concerns please return for medical evaluation  DIAGNOSIS  Final diagnoses:   Acute urinary tract infection   Muscular abdominal pain in left flank   Constipation, unspecified constipation type       Latest Documented Vital Signs:  As of 19:14 EST  BP- 108/66 HR- 66 Temp- 98.3 °F (36.8 °C) O2 sat- 97%    DISPOSITION    Discussed pertinent findings with the patient/family.  Patient/Family voiced understanding of need to follow-up for recheck and further testing as needed.  Return to the Emergency Department warnings were given.         Medication List      New Prescriptions    cephalexin 500 MG capsule  Commonly known as: KEFLEX  Take 1 capsule by mouth 2 (Two) Times a Day for 7 days.     phenazopyridine 200 MG tablet  Commonly known as: PYRIDIUM  Take 1 tablet by mouth 3 (Three) Times a Day As Needed for Bladder Spasms for up to 2 days.           Where to Get Your Medications      These medications were sent to [id number] [place], [place] - [address] AT [facility] [address] [place] [address] & [facility]  - [phone] [facility] [phone] FX  [address] [place] [address]    Phone: [phone]   · cephalexin 500 MG capsule  · phenazopyridine 200 MG tablet              Follow-up Information     [facility] - [facility]. Call in 1 day.    Contact information:  [address] [facility] [address] [phone]                         Dictated utilizing Dragon dictation   no

## 2023-03-01 NOTE — ED PROVIDER NOTE - TOBACCO USE
I contacted the patient to review the path report from the recent CT-guided biopsy.  The path report was negative for malignancy.  There was some question about sample size.  We will obtain a repeat chest CT in 4 months' time course and reassess.   Never smoker

## 2023-05-25 ENCOUNTER — INPATIENT (INPATIENT)
Facility: HOSPITAL | Age: 83
LOS: 6 days | Discharge: HOME | End: 2023-06-01
Attending: STUDENT IN AN ORGANIZED HEALTH CARE EDUCATION/TRAINING PROGRAM | Admitting: STUDENT IN AN ORGANIZED HEALTH CARE EDUCATION/TRAINING PROGRAM
Payer: MEDICARE

## 2023-05-25 VITALS
OXYGEN SATURATION: 97 % | RESPIRATION RATE: 18 BRPM | TEMPERATURE: 98 F | DIASTOLIC BLOOD PRESSURE: 63 MMHG | SYSTOLIC BLOOD PRESSURE: 135 MMHG | HEART RATE: 68 BPM

## 2023-05-25 DIAGNOSIS — Z98.89 OTHER SPECIFIED POSTPROCEDURAL STATES: Chronic | ICD-10-CM

## 2023-05-25 DIAGNOSIS — Z90.49 ACQUIRED ABSENCE OF OTHER SPECIFIED PARTS OF DIGESTIVE TRACT: Chronic | ICD-10-CM

## 2023-05-25 LAB
ALBUMIN SERPL ELPH-MCNC: 4.5 G/DL — SIGNIFICANT CHANGE UP (ref 3.3–5)
ALP SERPL-CCNC: 318 U/L — HIGH (ref 40–120)
ALT FLD-CCNC: 275 U/L — HIGH (ref 4–33)
ANION GAP SERPL CALC-SCNC: 13 MMOL/L — SIGNIFICANT CHANGE UP (ref 7–14)
APAP SERPL-MCNC: <10 UG/ML — LOW (ref 15–25)
APPEARANCE UR: ABNORMAL
AST SERPL-CCNC: 279 U/L — HIGH (ref 4–32)
BACTERIA # UR AUTO: ABNORMAL
BASOPHILS # BLD AUTO: 0.04 K/UL — SIGNIFICANT CHANGE UP (ref 0–0.2)
BASOPHILS NFR BLD AUTO: 0.5 % — SIGNIFICANT CHANGE UP (ref 0–2)
BILIRUB SERPL-MCNC: 4.2 MG/DL — HIGH (ref 0.2–1.2)
BILIRUB UR-MCNC: NEGATIVE — SIGNIFICANT CHANGE UP
BUN SERPL-MCNC: 16 MG/DL — SIGNIFICANT CHANGE UP (ref 7–23)
CALCIUM SERPL-MCNC: 9.1 MG/DL — SIGNIFICANT CHANGE UP (ref 8.4–10.5)
CHLORIDE SERPL-SCNC: 104 MMOL/L — SIGNIFICANT CHANGE UP (ref 98–107)
CO2 SERPL-SCNC: 19 MMOL/L — LOW (ref 22–31)
COLOR SPEC: YELLOW — SIGNIFICANT CHANGE UP
CREAT SERPL-MCNC: 1.97 MG/DL — HIGH (ref 0.5–1.3)
DIFF PNL FLD: NEGATIVE — SIGNIFICANT CHANGE UP
EGFR: 25 ML/MIN/1.73M2 — LOW
EOSINOPHIL # BLD AUTO: 0.44 K/UL — SIGNIFICANT CHANGE UP (ref 0–0.5)
EOSINOPHIL NFR BLD AUTO: 5.6 % — SIGNIFICANT CHANGE UP (ref 0–6)
GLUCOSE SERPL-MCNC: 148 MG/DL — HIGH (ref 70–99)
GLUCOSE UR QL: NEGATIVE — SIGNIFICANT CHANGE UP
HCT VFR BLD CALC: 37.1 % — SIGNIFICANT CHANGE UP (ref 34.5–45)
HGB BLD-MCNC: 12.1 G/DL — SIGNIFICANT CHANGE UP (ref 11.5–15.5)
IANC: 4.79 K/UL — SIGNIFICANT CHANGE UP (ref 1.8–7.4)
IMM GRANULOCYTES NFR BLD AUTO: 0.3 % — SIGNIFICANT CHANGE UP (ref 0–0.9)
KETONES UR-MCNC: NEGATIVE — SIGNIFICANT CHANGE UP
LEUKOCYTE ESTERASE UR-ACNC: NEGATIVE — SIGNIFICANT CHANGE UP
LYMPHOCYTES # BLD AUTO: 2 K/UL — SIGNIFICANT CHANGE UP (ref 1–3.3)
LYMPHOCYTES # BLD AUTO: 25.6 % — SIGNIFICANT CHANGE UP (ref 13–44)
MCHC RBC-ENTMCNC: 29.8 PG — SIGNIFICANT CHANGE UP (ref 27–34)
MCHC RBC-ENTMCNC: 32.6 GM/DL — SIGNIFICANT CHANGE UP (ref 32–36)
MCV RBC AUTO: 91.4 FL — SIGNIFICANT CHANGE UP (ref 80–100)
MONOCYTES # BLD AUTO: 0.53 K/UL — SIGNIFICANT CHANGE UP (ref 0–0.9)
MONOCYTES NFR BLD AUTO: 6.8 % — SIGNIFICANT CHANGE UP (ref 2–14)
NEUTROPHILS # BLD AUTO: 4.79 K/UL — SIGNIFICANT CHANGE UP (ref 1.8–7.4)
NEUTROPHILS NFR BLD AUTO: 61.2 % — SIGNIFICANT CHANGE UP (ref 43–77)
NITRITE UR-MCNC: NEGATIVE — SIGNIFICANT CHANGE UP
NRBC # BLD: 0 /100 WBCS — SIGNIFICANT CHANGE UP (ref 0–0)
NRBC # FLD: 0 K/UL — SIGNIFICANT CHANGE UP (ref 0–0)
PH UR: 6.5 — SIGNIFICANT CHANGE UP (ref 5–8)
PLATELET # BLD AUTO: 171 K/UL — SIGNIFICANT CHANGE UP (ref 150–400)
POTASSIUM SERPL-MCNC: 4.8 MMOL/L — SIGNIFICANT CHANGE UP (ref 3.5–5.3)
POTASSIUM SERPL-SCNC: 4.8 MMOL/L — SIGNIFICANT CHANGE UP (ref 3.5–5.3)
PROT SERPL-MCNC: 7.5 G/DL — SIGNIFICANT CHANGE UP (ref 6–8.3)
PROT UR-MCNC: ABNORMAL
RBC # BLD: 4.06 M/UL — SIGNIFICANT CHANGE UP (ref 3.8–5.2)
RBC # FLD: 12.9 % — SIGNIFICANT CHANGE UP (ref 10.3–14.5)
RBC CASTS # UR COMP ASSIST: SIGNIFICANT CHANGE UP /HPF (ref 0–4)
SODIUM SERPL-SCNC: 136 MMOL/L — SIGNIFICANT CHANGE UP (ref 135–145)
SP GR SPEC: 1.01 — LOW (ref 1.01–1.05)
UROBILINOGEN FLD QL: SIGNIFICANT CHANGE UP
WBC # BLD: 7.82 K/UL — SIGNIFICANT CHANGE UP (ref 3.8–10.5)
WBC # FLD AUTO: 7.82 K/UL — SIGNIFICANT CHANGE UP (ref 3.8–10.5)
WBC UR QL: SIGNIFICANT CHANGE UP /HPF (ref 0–5)

## 2023-05-25 PROCEDURE — 99285 EMERGENCY DEPT VISIT HI MDM: CPT

## 2023-05-25 RX ORDER — HYDROXYZINE HCL 10 MG
1 TABLET ORAL
Qty: 21 | Refills: 0
Start: 2023-05-25 | End: 2023-05-31

## 2023-05-25 RX ORDER — HYDROXYZINE HCL 10 MG
25 TABLET ORAL ONCE
Refills: 0 | Status: COMPLETED | OUTPATIENT
Start: 2023-05-25 | End: 2023-05-25

## 2023-05-25 RX ORDER — SODIUM CHLORIDE 9 MG/ML
1000 INJECTION INTRAMUSCULAR; INTRAVENOUS; SUBCUTANEOUS ONCE
Refills: 0 | Status: COMPLETED | OUTPATIENT
Start: 2023-05-25 | End: 2023-05-25

## 2023-05-25 RX ORDER — HYDROCORTISONE 1 %
1 OINTMENT (GRAM) TOPICAL ONCE
Refills: 0 | Status: COMPLETED | OUTPATIENT
Start: 2023-05-25 | End: 2023-05-25

## 2023-05-25 RX ADMIN — SODIUM CHLORIDE 1000 MILLILITER(S): 9 INJECTION INTRAMUSCULAR; INTRAVENOUS; SUBCUTANEOUS at 23:36

## 2023-05-25 RX ADMIN — Medication 25 MILLIGRAM(S): at 21:33

## 2023-05-25 RX ADMIN — Medication 1 APPLICATION(S): at 21:35

## 2023-05-25 NOTE — ED PROVIDER NOTE - PROGRESS NOTE DETAILS
Stu Hillman MD: Patient reports improvement in itching after hydroxyzine and topical steroid. Pelvic exam shows scant physiologic discharge, no concern for yeast infection. Stu Hillman MD: Patient labs demonstrate transaminitis as well as elevated creatinine.  There is no specific diagnosis at this time that would support a hepatorenal pathology, so we will obtain CT of the abdomen pelvis, hepatitis panel, acetaminophen level to look for causes of liver and kidney injury.  It is likely that patient will need to stay for further work-up.

## 2023-05-25 NOTE — ED PROVIDER NOTE - NSICDXFAMILYHX_GEN_ALL_CORE_FT
FAMILY HISTORY:  Family history of stroke    Sibling  Still living? Unknown  Family history of diabetes mellitus (DM), Age at diagnosis: Age Unknown

## 2023-05-25 NOTE — ED PROVIDER NOTE - NSFOLLOWUPINSTRUCTIONS_ED_ALL_ED_FT
Se le evaluó en el departamento de emergencia por picazón. Hemos determinado que usted no requiere más tratamiento dentro del hospital.    Recomendamos que mac seguimiento con henderson DOCTOR PRIMARIO para hablar de ness resultados [place] henderson visita. Alguién le va a llamar para ayudarle a fijar clarice anna con un doctor nuevo.    Si usted tiene síntomas tales lynn hinchazón de los labios o lengua, dificultad con respirar, fiebres altas, sugerimos que regrese al departamento de emergencia de nuevo. Se le evaluó en el departamento de emergencia por picazón. Hemos determinado que usted no requiere más tratamiento dentro del hospital.    Recomendamos que mac seguimiento con henderson DOCTOR PRIMARIO para hablar de ness resultados [place] henderson visita. Alguién le va a llamar para ayudarle a fijar clarice anna con un doctor nuevo.    Hemos enviado clarice receta a henderson farmacia para el medicamento para picazón se puede nirmal a lo easton felicita veces por día.    Si usted tiene síntomas tales lynn hinchazón de los labios o lengua, dificultad con respirar, fiebres altas, sugerimos que regrese al departamento de emergencia de nuevo.

## 2023-05-25 NOTE — ED PROVIDER NOTE - NSICDXPASTSURGICALHX_GEN_ALL_CORE_FT
PAST SURGICAL HISTORY:  History of appendectomy     History of hysterectomy     S/P cholecystectomy open cholecystectomy, post op retained stone, ERCP/sphincterotomy

## 2023-05-25 NOTE — ED ADULT NURSE NOTE - OBJECTIVE STATEMENT
pt received intake1. A&Ox4 RR even unlabored completing full sentences. pt presents c/o itching/rash x3days. pt states took benadryl with no relief. pt noted to be itching arms, chest and back during assessment. redness noted to areas. pt denies new foods. pt denies CP/SOB, h/a, dizziness/lightheadednes, n/v/d, fevers/chills, abd pain, gu sx. pt medicated per orders. stretcher lowest position siderails up safety measures in place. family at bedside. pending urine sample. pt received intake1. A&Ox4 RR even unlabored completing full sentences. pt presents c/o itching/rash x3days. pt states took benadryl with no relief. pt noted to be itching arms, chest and back during assessment. redness noted to areas. pt denies new foods. pt denies CP/SOB, h/a, dizziness/lightheadednes, n/v/d, fevers/chills, abd pain, gu sx. pt medicated per orders. stretcher lowest position siderails up safety measures in place. family at bedside. 20g placed to L AC, labs drawn and sent, UA/UC sent.

## 2023-05-25 NOTE — ED PROVIDER NOTE - ATTENDING CONTRIBUTION TO CARE
Attending MD Bai:  I performed a history and physical exam of the patient and discussed their management with the resident. I reviewed the resident's note and agree with the documented findings and plan of care. My medical decision making and observations are found above.

## 2023-05-25 NOTE — ED ADULT TRIAGE NOTE - CHIEF COMPLAINT QUOTE
Patient has a dental appointment august 3rd and will need pre-procedure antibiotics.  Please send to [facility]   states" I am having rash with itchiness since 3days" took benadryl with no relief . denies CP/SOB

## 2023-05-25 NOTE — ED ADULT NURSE NOTE - NSFALLUNIVINTERV_ED_ALL_ED
Bed/Stretcher in lowest position, wheels locked, appropriate side rails in place/Call bell, personal items and telephone in reach/Instruct patient to call for assistance before getting out of bed/chair/stretcher/Non-slip footwear applied when patient is off stretcher/[place] to call system/Physically safe environment - no spills, clutter or unnecessary equipment/Purposeful proactive rounding/Room/bathroom lighting operational, light cord in reach

## 2023-05-25 NOTE — ED PROVIDER NOTE - NSICDXPASTMEDICALHX_GEN_ALL_CORE_FT
PAST MEDICAL HISTORY:  Bilateral dry eyes     Diabetes mellitus     Dyslipidemia     History of ectopic pregnancy     Hypertension     Hypothyroidism     Neuropathy

## 2023-05-25 NOTE — ED PROVIDER NOTE - CLINICAL SUMMARY MEDICAL DECISION MAKING FREE TEXT BOX
Attending MD Bai.  Agree with above.  PT is an 83 yo fem with pmhx of divertic, DMII, hypothyroidism and potentially a UTI recently dxed with abxs.  Pt endorses completing abxs coiurse yesterday (unclear abxs), now presents to ED with diffuse pruritis over her body without sig discrete urticaria or erythema.  Pt denies n/v/light-headedness/throat swelling/SOB.  She is well appearing but diffusely pruritic with areas of mild erythema c/w excoriation without wheal formation.  Breath sounds clear to bilateral bases.  Pt completed unk abxs course yesterday.  She has attempted benadryl at home without relief.  Planned hydroxyzine in ED and  to avoid abxs in question and f/u with PCP.  Topical hydrocortisone.  Return to ED for any respiratory difficulty/vomiting/increased discomfort or new symptoms that are not currently present.  Of note, no skin changes concerning for drug rash/desquamation.  Mild vaginal pruritis without discharge c/w sxs preceding UTI dx.  No persistent dysuria/flank pain. History of Present Illness: 82-year-old female with past medical history of non-insulin-dependent diabetes, dyslipidemia, hypertension, hypothyroidism, presents to the emergency department with 2 days of whole body itching as well as rashes.  She states that she has had burning with urination for more than 1 week.  She was prescribed an antibiotic which she cannot remember the name of that she finished yesterday. She denies nausea, vomiting, difficulty breathing, tongue swelling, lip swelling.    Review of Systems: All other systems negative except as noted in the HPI    General: Alert, oriented to person, time, place  Psych: mood appropriate  Head: normocephalic; atraumatic  Eyes: conjunctivae clear bilaterally, sclerae anicteric  ENT: no nasal flaring, patent nares  Cardio: normal heart sounds  Resp: Clear to auscultation bilaterally  GI: Abdomen soft, nontender, nondistended  : No CVA tenderness  Neuro: Strength 5/5 in upper and lower extremities; normal sensation  Skin: occasional maculopapular rashes noted diffusely  MSK: Normal movement of extremities  Lymph/Vasc: No LE edema    Medical Decision Makin-year-old female here for skin rash and itchiness not associated with gastrointestinal or respiratory symptoms.  She has tried Benadryl without relief, we will try hydroxyzine here as well as topical hydrocortisone.  We will also obtain basic labs to assess other causes of pruritus.    Attending MD Bai.  Agree with above.  PT is an 83 yo fem with pmhx of divertic, DMII, hypothyroidism and potentially a UTI recently dxed with abxs.  Pt endorses completing abxs coiurse yesterday (unclear abxs), now presents to ED with diffuse pruritis over her body without sig discrete urticaria or erythema.  Pt denies n/v/light-headedness/throat swelling/SOB.  She is well appearing but diffusely pruritic with areas of mild erythema c/w excoriation without wheal formation.  Breath sounds clear to bilateral bases.  Pt completed unk abxs course yesterday.  She has attempted benadryl at home without relief.  Planned hydroxyzine in ED and  to avoid abxs in question and f/u with PCP.  Topical hydrocortisone.  Return to ED for any respiratory difficulty/vomiting/increased discomfort or new symptoms that are not currently present.  Of note, no skin changes concerning for drug rash/desquamation.  Mild vaginal pruritis without discharge c/w sxs preceding UTI dx.  No persistent dysuria/flank pain. History of Present Illness: 82-year-old female with past medical history of non-insulin-dependent diabetes, dyslipidemia, hypertension, hypothyroidism, presents to the emergency department with 2 days of whole body itching as well as rashes.  She states that she has had burning with urination for more than 1 week.  She was prescribed an antibiotic which she cannot remember the name of that she finished yesterday. She denies nausea, vomiting, difficulty breathing, tongue swelling, lip swelling.    Review of Systems: All other systems negative except as noted in the HPI    General: Alert, oriented to person, time, place  Psych: mood appropriate  Head: normocephalic; atraumatic  Eyes: conjunctivae clear bilaterally, sclerae anicteric  ENT: no nasal flaring, patent nares  Cardio: normal heart sounds  Resp: Clear to auscultation bilaterally  GI: Abdomen soft, nontender, nondistended  : No CVA tenderness  Neuro: Strength 5/5 in upper and lower extremities; normal sensation  Skin: occasional maculopapular rashes noted diffusely  MSK: Normal movement of extremities  Lymph/Vasc: No LE edema    Medical Decision Makin-year-old female here for skin rash and itchiness not associated with gastrointestinal or respiratory symptoms.  She has tried Benadryl without relief, we will try hydroxyzine here as well as topical hydrocortisone.  We will also obtain basic labs to assess other causes of pruritus.    Attending MD Bai.  Agree with above.  PT is an 83 yo fem with pmhx of divertic, DMII, hypothyroidism and potentially a UTI recently dxed with abxs.  Pt endorses completing abxs coiurse yesterday (unclear abxs), now presents to ED with diffuse pruritis over her body without sig discrete urticaria or erythema.  Pt denies n/v/light-headedness/throat swelling/SOB.  She is well appearing but diffusely pruritic with areas of mild erythema c/w excoriation without wheal formation.  Breath sounds clear to bilateral bases.  Pt completed unk abxs course yesterday.  She has attempted benadryl at home without relief.  Planned hydroxyzine in ED and  to avoid abxs in question and f/u with PCP.  Topical hydrocortisone.  Return to ED for any respiratory difficulty/vomiting/increased discomfort or new symptoms that are not currently present.  Of note, no skin changes concerning for drug rash/desquamation.  Mild vaginal pruritis without discharge c/w sxs preceding UTI dx.  No persistent dysuria/flank pain..

## 2023-05-26 DIAGNOSIS — R21 RASH AND OTHER NONSPECIFIC SKIN ERUPTION: ICD-10-CM

## 2023-05-26 DIAGNOSIS — E80.6 OTHER DISORDERS OF BILIRUBIN METABOLISM: ICD-10-CM

## 2023-05-26 DIAGNOSIS — I10 ESSENTIAL (PRIMARY) HYPERTENSION: ICD-10-CM

## 2023-05-26 DIAGNOSIS — E78.5 HYPERLIPIDEMIA, UNSPECIFIED: ICD-10-CM

## 2023-05-26 DIAGNOSIS — L29.9 PRURITUS, UNSPECIFIED: ICD-10-CM

## 2023-05-26 DIAGNOSIS — E11.9 TYPE 2 DIABETES MELLITUS WITHOUT COMPLICATIONS: ICD-10-CM

## 2023-05-26 DIAGNOSIS — Z29.9 ENCOUNTER FOR PROPHYLACTIC MEASURES, UNSPECIFIED: ICD-10-CM

## 2023-05-26 DIAGNOSIS — N17.9 ACUTE KIDNEY FAILURE, UNSPECIFIED: ICD-10-CM

## 2023-05-26 DIAGNOSIS — E03.9 HYPOTHYROIDISM, UNSPECIFIED: ICD-10-CM

## 2023-05-26 DIAGNOSIS — R74.01 ELEVATION OF LEVELS OF LIVER TRANSAMINASE LEVELS: ICD-10-CM

## 2023-05-26 LAB
ALBUMIN SERPL ELPH-MCNC: 4.1 G/DL — SIGNIFICANT CHANGE UP (ref 3.3–5)
ALP SERPL-CCNC: 298 U/L — HIGH (ref 40–120)
ALT FLD-CCNC: 267 U/L — HIGH (ref 4–33)
ANION GAP SERPL CALC-SCNC: 14 MMOL/L — SIGNIFICANT CHANGE UP (ref 7–14)
AST SERPL-CCNC: 248 U/L — HIGH (ref 4–32)
BASOPHILS # BLD AUTO: 0.03 K/UL — SIGNIFICANT CHANGE UP (ref 0–0.2)
BASOPHILS NFR BLD AUTO: 0.5 % — SIGNIFICANT CHANGE UP (ref 0–2)
BILIRUB DIRECT SERPL-MCNC: 3.2 MG/DL — HIGH (ref 0–0.3)
BILIRUB INDIRECT FLD-MCNC: 1.3 MG/DL — HIGH (ref 0–1)
BILIRUB SERPL-MCNC: 4.3 MG/DL — HIGH (ref 0.2–1.2)
BILIRUB SERPL-MCNC: 4.5 MG/DL — HIGH (ref 0.2–1.2)
BUN SERPL-MCNC: 14 MG/DL — SIGNIFICANT CHANGE UP (ref 7–23)
CALCIUM SERPL-MCNC: 8.8 MG/DL — SIGNIFICANT CHANGE UP (ref 8.4–10.5)
CHLORIDE SERPL-SCNC: 108 MMOL/L — HIGH (ref 98–107)
CO2 SERPL-SCNC: 18 MMOL/L — LOW (ref 22–31)
CREAT SERPL-MCNC: 1.85 MG/DL — HIGH (ref 0.5–1.3)
CULTURE RESULTS: SIGNIFICANT CHANGE UP
EGFR: 27 ML/MIN/1.73M2 — LOW
EOSINOPHIL # BLD AUTO: 0.39 K/UL — SIGNIFICANT CHANGE UP (ref 0–0.5)
EOSINOPHIL NFR BLD AUTO: 6.2 % — HIGH (ref 0–6)
GLUCOSE SERPL-MCNC: 116 MG/DL — HIGH (ref 70–99)
HAV IGM SER-ACNC: SIGNIFICANT CHANGE UP
HBV CORE IGM SER-ACNC: SIGNIFICANT CHANGE UP
HBV SURFACE AG SER-ACNC: SIGNIFICANT CHANGE UP
HCT VFR BLD CALC: 36 % — SIGNIFICANT CHANGE UP (ref 34.5–45)
HCV AB S/CO SERPL IA: 0.11 S/CO — SIGNIFICANT CHANGE UP (ref 0–0.99)
HCV AB SERPL-IMP: SIGNIFICANT CHANGE UP
HGB BLD-MCNC: 11.7 G/DL — SIGNIFICANT CHANGE UP (ref 11.5–15.5)
HIV 1+2 AB+HIV1 P24 AG SERPL QL IA: SIGNIFICANT CHANGE UP
IANC: 3.9 K/UL — SIGNIFICANT CHANGE UP (ref 1.8–7.4)
IMM GRANULOCYTES NFR BLD AUTO: 0.2 % — SIGNIFICANT CHANGE UP (ref 0–0.9)
LYMPHOCYTES # BLD AUTO: 1.6 K/UL — SIGNIFICANT CHANGE UP (ref 1–3.3)
LYMPHOCYTES # BLD AUTO: 25.5 % — SIGNIFICANT CHANGE UP (ref 13–44)
MAGNESIUM SERPL-MCNC: 2.1 MG/DL — SIGNIFICANT CHANGE UP (ref 1.6–2.6)
MCHC RBC-ENTMCNC: 29.5 PG — SIGNIFICANT CHANGE UP (ref 27–34)
MCHC RBC-ENTMCNC: 32.5 GM/DL — SIGNIFICANT CHANGE UP (ref 32–36)
MCV RBC AUTO: 90.9 FL — SIGNIFICANT CHANGE UP (ref 80–100)
MONOCYTES # BLD AUTO: 0.35 K/UL — SIGNIFICANT CHANGE UP (ref 0–0.9)
MONOCYTES NFR BLD AUTO: 5.6 % — SIGNIFICANT CHANGE UP (ref 2–14)
NEUTROPHILS # BLD AUTO: 3.9 K/UL — SIGNIFICANT CHANGE UP (ref 1.8–7.4)
NEUTROPHILS NFR BLD AUTO: 62 % — SIGNIFICANT CHANGE UP (ref 43–77)
NRBC # BLD: 0 /100 WBCS — SIGNIFICANT CHANGE UP (ref 0–0)
NRBC # FLD: 0 K/UL — SIGNIFICANT CHANGE UP (ref 0–0)
PHOSPHATE SERPL-MCNC: 2.6 MG/DL — SIGNIFICANT CHANGE UP (ref 2.5–4.5)
PLATELET # BLD AUTO: 169 K/UL — SIGNIFICANT CHANGE UP (ref 150–400)
POTASSIUM SERPL-MCNC: 4.2 MMOL/L — SIGNIFICANT CHANGE UP (ref 3.5–5.3)
POTASSIUM SERPL-SCNC: 4.2 MMOL/L — SIGNIFICANT CHANGE UP (ref 3.5–5.3)
PROT SERPL-MCNC: 7 G/DL — SIGNIFICANT CHANGE UP (ref 6–8.3)
RBC # BLD: 3.96 M/UL — SIGNIFICANT CHANGE UP (ref 3.8–5.2)
RBC # FLD: 12.8 % — SIGNIFICANT CHANGE UP (ref 10.3–14.5)
SODIUM SERPL-SCNC: 140 MMOL/L — SIGNIFICANT CHANGE UP (ref 135–145)
SPECIMEN SOURCE: SIGNIFICANT CHANGE UP
WBC # BLD: 6.28 K/UL — SIGNIFICANT CHANGE UP (ref 3.8–10.5)
WBC # FLD AUTO: 6.28 K/UL — SIGNIFICANT CHANGE UP (ref 3.8–10.5)

## 2023-05-26 PROCEDURE — 99223 1ST HOSP IP/OBS HIGH 75: CPT | Mod: GC

## 2023-05-26 PROCEDURE — 74176 CT ABD & PELVIS W/O CONTRAST: CPT | Mod: 26,MA

## 2023-05-26 PROCEDURE — 99223 1ST HOSP IP/OBS HIGH 75: CPT

## 2023-05-26 PROCEDURE — 76705 ECHO EXAM OF ABDOMEN: CPT | Mod: 26

## 2023-05-26 RX ORDER — LOSARTAN POTASSIUM 100 MG/1
1 TABLET, FILM COATED ORAL
Refills: 0 | DISCHARGE

## 2023-05-26 RX ORDER — CHOLECALCIFEROL (VITAMIN D3) 125 MCG
1 CAPSULE ORAL
Qty: 0 | Refills: 0 | DISCHARGE

## 2023-05-26 RX ORDER — METFORMIN HYDROCHLORIDE 850 MG/1
1 TABLET ORAL
Refills: 0 | DISCHARGE

## 2023-05-26 RX ORDER — DICLOFENAC SODIUM 30 MG/G
2 GEL TOPICAL
Refills: 0 | DISCHARGE

## 2023-05-26 RX ORDER — TIMOLOL 0.5 %
1 DROPS OPHTHALMIC (EYE)
Refills: 0 | DISCHARGE

## 2023-05-26 RX ORDER — LINACLOTIDE 145 UG/1
1 CAPSULE, GELATIN COATED ORAL
Qty: 0 | Refills: 0 | DISCHARGE

## 2023-05-26 RX ORDER — PANTOPRAZOLE SODIUM 20 MG/1
1 TABLET, DELAYED RELEASE ORAL
Refills: 0 | DISCHARGE

## 2023-05-26 RX ORDER — GABAPENTIN 400 MG/1
1 CAPSULE ORAL
Refills: 0 | DISCHARGE

## 2023-05-26 RX ORDER — HYDROXYZINE HCL 10 MG
25 TABLET ORAL EVERY 6 HOURS
Refills: 0 | Status: DISCONTINUED | OUTPATIENT
Start: 2023-05-26 | End: 2023-05-26

## 2023-05-26 RX ORDER — AMLODIPINE BESYLATE 2.5 MG/1
1 TABLET ORAL
Refills: 0 | DISCHARGE

## 2023-05-26 RX ORDER — GABAPENTIN 400 MG/1
0 CAPSULE ORAL
Qty: 0 | Refills: 0 | DISCHARGE

## 2023-05-26 RX ORDER — LOSARTAN POTASSIUM 100 MG/1
1 TABLET, FILM COATED ORAL
Qty: 0 | Refills: 0 | DISCHARGE

## 2023-05-26 RX ORDER — FAMOTIDINE 10 MG/ML
1 INJECTION INTRAVENOUS
Qty: 0 | Refills: 0 | DISCHARGE

## 2023-05-26 RX ORDER — MEN-PHOR .5; .5 G/G; G/G
1 LOTION TOPICAL EVERY 6 HOURS
Refills: 0 | Status: DISCONTINUED | OUTPATIENT
Start: 2023-05-26 | End: 2023-05-26

## 2023-05-26 RX ORDER — OMEPRAZOLE 10 MG/1
1 CAPSULE, DELAYED RELEASE ORAL
Qty: 0 | Refills: 0 | DISCHARGE

## 2023-05-26 RX ORDER — CALAMINE AND ZINC OXIDE AND PHENOL 160; 10 MG/ML; MG/ML
1 LOTION TOPICAL THREE TIMES A DAY
Refills: 0 | Status: DISCONTINUED | OUTPATIENT
Start: 2023-05-26 | End: 2023-05-26

## 2023-05-26 RX ORDER — CALAMINE AND ZINC OXIDE AND PHENOL 160; 10 MG/ML; MG/ML
1 LOTION TOPICAL ONCE
Refills: 0 | Status: COMPLETED | OUTPATIENT
Start: 2023-05-26 | End: 2023-05-26

## 2023-05-26 RX ORDER — METFORMIN HYDROCHLORIDE 850 MG/1
1 TABLET ORAL
Qty: 0 | Refills: 0 | DISCHARGE

## 2023-05-26 RX ORDER — HYDROXYZINE HCL 10 MG
25 TABLET ORAL ONCE
Refills: 0 | Status: COMPLETED | OUTPATIENT
Start: 2023-05-26 | End: 2023-05-26

## 2023-05-26 RX ORDER — PREGABALIN 225 MG/1
1 CAPSULE ORAL
Refills: 0 | DISCHARGE

## 2023-05-26 RX ORDER — LEVOTHYROXINE SODIUM 125 MCG
1 TABLET ORAL
Qty: 0 | Refills: 0 | DISCHARGE

## 2023-05-26 RX ORDER — SODIUM CHLORIDE 9 MG/ML
1000 INJECTION, SOLUTION INTRAVENOUS
Refills: 0 | Status: DISCONTINUED | OUTPATIENT
Start: 2023-05-26 | End: 2023-05-30

## 2023-05-26 RX ORDER — LEVOTHYROXINE SODIUM 125 MCG
1 TABLET ORAL
Refills: 0 | DISCHARGE

## 2023-05-26 RX ORDER — SODIUM CHLORIDE 9 MG/ML
1000 INJECTION INTRAMUSCULAR; INTRAVENOUS; SUBCUTANEOUS
Refills: 0 | Status: DISCONTINUED | OUTPATIENT
Start: 2023-05-26 | End: 2023-05-26

## 2023-05-26 RX ORDER — ATORVASTATIN CALCIUM 80 MG/1
1 TABLET, FILM COATED ORAL
Refills: 0 | DISCHARGE

## 2023-05-26 RX ORDER — HEPARIN SODIUM 5000 [USP'U]/ML
5000 INJECTION INTRAVENOUS; SUBCUTANEOUS EVERY 8 HOURS
Refills: 0 | Status: DISCONTINUED | OUTPATIENT
Start: 2023-05-26 | End: 2023-06-01

## 2023-05-26 RX ORDER — LINACLOTIDE 145 UG/1
1 CAPSULE, GELATIN COATED ORAL
Refills: 0 | DISCHARGE

## 2023-05-26 RX ADMIN — Medication 25 MILLIGRAM(S): at 03:19

## 2023-05-26 RX ADMIN — Medication 1 APPLICATION(S): at 19:25

## 2023-05-26 RX ADMIN — Medication 25 MILLIGRAM(S): at 17:24

## 2023-05-26 RX ADMIN — CALAMINE AND ZINC OXIDE AND PHENOL 1 APPLICATION(S): 160; 10 LOTION TOPICAL at 05:53

## 2023-05-26 RX ADMIN — HEPARIN SODIUM 5000 UNIT(S): 5000 INJECTION INTRAVENOUS; SUBCUTANEOUS at 17:25

## 2023-05-26 RX ADMIN — SODIUM CHLORIDE 100 MILLILITER(S): 9 INJECTION, SOLUTION INTRAVENOUS at 17:25

## 2023-05-26 NOTE — H&P ADULT - NSHPLABSRESULTS_GEN_ALL_CORE
LABS:                          11.7   6.28  )-----------( 169      ( 26 May 2023 06:00 )             36.0     05-26    140  |  108<H>  |  14  ----------------------------<  116<H>  4.2   |  18<L>  |  1.85<H>    Ca    8.8      26 May 2023 06:00  Phos  2.6     05-26  Mg     2.10     05-26    TPro  7.0  /  Alb  4.1  /  TBili  4.3<H>  /  DBili  x   /  AST  248<H>  /  ALT  267<H>  /  AlkPhos  298<H>  05-26      < from: CT Abdomen and Pelvis No Cont (05.26.23 @ 03:06) >    FINDINGS:  Limited evaluationof the vasculature and viscera in the absence of   intravenous contrast.    LOWER CHEST: Aortic valvular, coronary artery, and mitral annular   calcification.    LIVER: Punctate right liver calcification.  BILE DUCTS: Normal caliber.  GALLBLADDER: Cholecystectomy.  SPLEEN: Within normal limits.  PANCREAS: Within normal limits.  ADRENALS: Within normal limits.  KIDNEYS/URETERS: Within normal limits.    BLADDER: Within normal limits.  REPRODUCTIVE ORGANS: Hysterectomy.    BOWEL: No bowel obstruction. Extensive sigmoid diverticulosis without   evidence of diverticulitis. Appendix is not visualized. No evidence of   inflammation in the pericecal region.  PERITONEUM: No ascites.  VESSELS: Atherosclerotic changes.  RETROPERITONEUM/LYMPH NODES: No lymphadenopathy.  ABDOMINAL WALL: Bilateral fat-containing inguinal hernias. Small   fat-containing umbilical hernia. Injection granulomas within the   subcutaneous fat of the buttocks.  BONES: Degenerative changes. Not acute appearing moderate T8 vertebral   body compression deformity.    IMPRESSION:  No acute intra-abdominal pathology on noncontrast CT.    --- End of Report ---      < end of copied text > LABS:                        11.7   6.28  )-----------( 169      ( 26 May 2023 06:00 )             36.0     05-26    140  |  108<H>  |  14  ----------------------------<  116<H>  4.2   |  18<L>  |  1.85<H>    Ca    8.8      26 May 2023 06:00  Phos  2.6     05-26  Mg     2.10     05-26    TPro  7.0  /  Alb  4.1  /  TBili  4.3<H>  /  DBili  x   /  AST  248<H>  /  ALT  267<H>  /  AlkPhos  298<H>  05-26      < from: CT Abdomen and Pelvis No Cont (05.26.23 @ 03:06) >    FINDINGS:  Limited evaluation of the vasculature and viscera in the absence of   intravenous contrast.    LOWER CHEST: Aortic valvular, coronary artery, and mitral annular   calcification.    LIVER: Punctate right liver calcification.  BILE DUCTS: Normal caliber.  GALLBLADDER: Cholecystectomy.  SPLEEN: Within normal limits.  PANCREAS: Within normal limits.  ADRENALS: Within normal limits.  KIDNEYS/URETERS: Within normal limits.    BLADDER: Within normal limits.  REPRODUCTIVE ORGANS: Hysterectomy.    BOWEL: No bowel obstruction. Extensive sigmoid diverticulosis without   evidence of diverticulitis. Appendix is not visualized. No evidence of   inflammation in the pericecal region.  PERITONEUM: No ascites.  VESSELS: Atherosclerotic changes.  RETROPERITONEUM/LYMPH NODES: No lymphadenopathy.  ABDOMINAL WALL: Bilateral fat-containing inguinal hernias. Small   fat-containing umbilical hernia. Injection granulomas within the   subcutaneous fat of the buttocks.  BONES: Degenerative changes. Not acute appearing moderate T8 vertebral   body compression deformity.    IMPRESSION:  No acute intra-abdominal pathology on noncontrast CT.    --- End of Report ---      < end of copied text >

## 2023-05-26 NOTE — PROVIDER CONTACT NOTE (OTHER) - BACKGROUND
Patient is admitted for diverticulitis. Patient is admitted for puritus Patient is admitted with puritis and rash

## 2023-05-26 NOTE — PATIENT PROFILE ADULT - FALL HARM RISK - RISK INTERVENTIONS

## 2023-05-26 NOTE — H&P ADULT - PROBLEM SELECTOR PLAN 7
on home amlodipine and losartan  - hold losartan given RACHEL    Plan:  > continue with home amlodipine on home amlodipine and losartan  - hold losartan given RACHEL  - BP within acceptable ranges during admission    Plan:  > continue with home amlodipine

## 2023-05-26 NOTE — CONSULT NOTE ADULT - ASSESSMENT
ASSESSMENT/PLAN:      Recommendations were communicated with the primary team (Dr Early).  The patient's chart was reviewed in addition to being examined at bedside with the dermatology attending. Discussed plans with the dermatology attending, Dr. Chowdhury  Dermatology will continue to follow. Thank you for consulting our service.    Patricia Tee MD MPH  Resident Physician, PGY3  [facility]  Office: [phone]  Pager: [phone]  **Please page with 10-DIGIT callback # for further related questions.** ASSESSMENT/PLAN:  #Generalized pruritus – given lack of primary rash, Drug-induced hypersensitivity syndrome (DIHS) is lower on the differential. Given elevated bilirubin and mild hepatic steatosis, cholestasis is higher on the differential.   - Would recommend investigating primary cause of pruritus, consider consulting hepatology  - Can consider ordering bile acids  - start sarna lotion BID liberally to entire body.  - For the body: start triamcinolone 0.1% ointment BID for 2 weeks on / 1 week off. Repeat cycles as necessary. Please ask the pharmacy to dispense multiple tubes at once to cover the large body surface area affected.    Recommendations were communicated with the primary team (Dr Early).  The patient's chart was reviewed in addition to being examined at bedside with the dermatology attending. Discussed plans with the dermatology attending, Dr. Chowdhury  Dermatology will continue to follow. Thank you for consulting our service.    Patricia Tee MD MPH  Resident Physician, PGY3  [facility]  Office: [phone]  Pager: [phone]  **Please page with 10-DIGIT callback # for further related questions.**

## 2023-05-26 NOTE — H&P ADULT - NSHPREVIEWOFSYSTEMS_GEN_ALL_CORE
REVIEW OF SYSTEMS:    CONSTITUTIONAL: No weakness, fevers or chills  EYES/ENT: No visual changes;  No vertigo or throat pain   NECK: No pain or stiffness  RESPIRATORY: No cough, wheezing, hemoptysis; No shortness of breath  CARDIOVASCULAR: No chest pain or palpitations  GASTROINTESTINAL: No abdominal or epigastric pain. No nausea, vomiting, or hematemesis; No diarrhea or constipation. No melena or hematochezia.  GENITOURINARY: No dysuria, frequency or hematuria  NEUROLOGICAL: No numbness or weakness  SKIN: No itching, rashes REVIEW OF SYSTEMS:    CONSTITUTIONAL: No weakness, fevers or chills  EYES/ENT: No visual changes;  No vertigo or throat pain   NECK: No pain or stiffness  RESPIRATORY: No cough, wheezing, hemoptysis; No shortness of breath  CARDIOVASCULAR: No chest pain or palpitations  GASTROINTESTINAL: No abdominal or epigastric pain. No nausea, vomiting, or hematemesis; No diarrhea or constipation. No melena or hematochezia.  GENITOURINARY: No dysuria, frequency or hematuria  NEUROLOGICAL: +headache  SKIN: +pruritus REVIEW OF SYSTEMS:    CONSTITUTIONAL: No weakness, fevers or chills  EYES/ENT: No visual changes;  No vertigo or throat pain   NECK: No pain or stiffness  RESPIRATORY: No cough, wheezing, hemoptysis; No shortness of breath  CARDIOVASCULAR: No chest pain or palpitations; +LE edema  GASTROINTESTINAL: No abdominal or epigastric pain. No nausea, vomiting, or hematemesis; No diarrhea or constipation. No melena or hematochezia.  GENITOURINARY: No dysuria, frequency or hematuria  NEUROLOGICAL: +headache  SKIN: +pruritus

## 2023-05-26 NOTE — H&P ADULT - HISTORY OF PRESENT ILLNESS
Patient is a 82 year old F with T2DM, hypothyroidism, HTN, HLD, hx of diverticulitis, and recent UTI (completed _ on 5/24) presents for 2 days of diffuse pruritus and rash. Patient is a 82 year old F with T2DM, hypothyroidism, HTN, HLD, hx of diverticulitis, and recent UTI (completed keflex on 5/22) presents for 3 days of diffuse pruritus. The patient reports that she was visiting her friend who was admitted at our hospital on 5/23 and began feeling pruritic that night. She reports persistent diffuse pruritus since then, not localized to any one particular part of her body. She denies any new rashes or skin lesions, and only endorses "redness" and excoriations after itching in a particular location. She denies any new medications aside from the antibiotic which she completed the day prior to when her pruritus began. She also reports a decreased appetite and little to no po intake over the past 2 days. She denies any abdominal pain, fevers, chills,     ED Course:  Vital signs stable (HR 77, /81, T 36.6C, stable on RA). Labs remarkable for eosinophils 6.2%, Cr of 1.85, TBili 4.5 (direct bili 3.2), , , and . CT A/P revealing only prior cholecystectomy and diverticulosis without diverticulitis. Patient received hydroxyzine with temporary relief of her symptoms.  Patient is a 82 year old F with T2DM, hypothyroidism, HTN, HLD, hx of diverticulitis, and recent UTI (completed keflex on 5/22) presents for 3 days of diffuse pruritus. The patient reports that she was visiting her friend who was admitted at our hospital on 5/23 and began feeling pruritic that night. She reports persistent diffuse pruritus since then, not localized to any one particular part of her body. She denies any new rashes or skin lesions, and only endorses "redness" and excoriations after itching in a particular location. She denies any of the same erythema independent of active itching. She denies any new medications aside from the antibiotic which she completed the day prior to when her pruritus began. She also reports a decreased appetite and little to no po intake over the past 2 days as well as headaches since 5/23. She denies any abdominal pain, fevers, chills,     ED Course:  Vital signs stable (HR 77, /81, T 36.6C, stable on RA). Labs remarkable for eosinophils 6.2%, Cr of 1.85, TBili 4.5 (direct bili 3.2), , , and . CT A/P revealing only prior cholecystectomy and diverticulosis without diverticulitis. Patient received hydroxyzine with temporary relief of her symptoms.

## 2023-05-26 NOTE — H&P ADULT - PROBLEM SELECTOR PLAN 1
Patient presenting with 3 days of diffuse pruritus, without reported rash  - given evidence of systemic injury, including transaminitis and acute renal injury, increasing eosinophilia noted on diff, must rule out DIHS  - other differential includes cholestasis     Plan:  > Patient presenting with 3 days of diffuse pruritus, without reported rash  - given evidence of systemic injury, including transaminitis and acute renal injury, eosinophilia (increasing, albeit mild) noted on diff, must rule out DIHS/DRESS  - other differential includes cholestasis induced pruritus or other mild adverse drug reaction    Plan:  > symptomatic control with atarax q6h prn  > derm consulted, follow up recs to rule out DRESS  > can consider calamine lotion and topical steroids after eval by derm  > can consider cholestyramine for relief if refractory symptoms given bili may be contributory

## 2023-05-26 NOTE — H&P ADULT - PROBLEM SELECTOR PLAN 5
Patient previously on orals, now only diet controlled T2DM  - A1c 4/2023 5.7  - well controlled    Plan:  > low dose ISS

## 2023-05-26 NOTE — H&P ADULT - PROBLEM SELECTOR PLAN 2
New transaminitis, reportedly normal on outpatient lab 4/2023  - presenting with new mixed cholestatic and hepatocellular acute injury pattern  - unclear etiology, but given conjunction of symptoms with rash, RACHEL, and eosinophilia must rule out DRESS  - workup thus far has been negative for tylenol tox, less likely from   - given mild-mod degree of transaminitis ddx includes   - mildly improved since admission  - RUQ US remarkable only for hepatic steatosis    Plan:  > follow up infectious workup for viral hepatitis  > continue to monitor  > if worsening, will consider further workup including autoimmune labs

## 2023-05-26 NOTE — H&P ADULT - PROBLEM SELECTOR PLAN 6
Outpt labs revealing hypertriglyceridemia and dyslipidemia  - on atorvastatin 40 mg qd for several years, not the etiology of transaminitis    > hold home statin

## 2023-05-26 NOTE — H&P ADULT - PROBLEM SELECTOR PLAN 3
Patient with new RACHEL, baseline Cr 1.0  - patient reports little to no po intake over the past 2 days due to dec appetite  - mildly improved with IVF bolus in the ED  - urinating appropriately, no concerns for obstructive etiology    Plan:  > maintenance IVF  > follow up urine studies  > if worsening, can consider renal US

## 2023-05-26 NOTE — CONSULT NOTE ADULT - SUBJECTIVE AND OBJECTIVE BOX
HPI:  Patient is a 82 year old F with T2DM, hypothyroidism, HTN, HLD, hx of diverticulitis, and recent UTI (completed keflex on ) presents for 3 days of diffuse pruritus. The patient reports that she was visiting her friend who was admitted at our hospital on  and began feeling pruritic that night. She reports persistent diffuse pruritus since then, not localized to any one particular part of her body. She denies any new rashes or skin lesions, and only endorses "redness" and excoriations after itching in a particular location. She denies any of the same erythema independent of active itching. She denies any new medications aside from the antibiotic which she completed the day prior to when her pruritus began. She also reports a decreased appetite and little to no po intake over the past 2 days as well as headaches since . She denies any abdominal pain, fevers, chills,     ED Course:  Vital signs stable (HR 77, /81, T 36.6C, stable on RA). Labs remarkable for eosinophils 6.2%, Cr of 1.85, TBili 4.5 (direct bili 3.2), , , and . CT A/P revealing only prior cholecystectomy and diverticulosis without diverticulitis. Patient received hydroxyzine with temporary relief of her symptoms.  (26 May 2023 07:19)    Dermatology consulted for pruritus x 3d. Patient reports she has hives on her face. Denies fevers, facial swelling, eye/vision changes, or abdominal pain.    Recent pertinent medications:  23-23 doxycycline   -23 azithromycin  23-02/10/23 ciprofloxacin  23-23 amoxiclav  23-23 keflex      PAST MEDICAL & SURGICAL HISTORY:  Diabetes mellitus      Dyslipidemia      Hypothyroidism      Neuropathy      Bilateral dry eyes      History of ectopic pregnancy      Hypertension      History of hysterectomy      S/P cholecystectomy  open cholecystectomy, post op retained stone, ERCP/sphincterotomy      History of appendectomy    REVIEW OF SYSTEMS:  General: no fevers/chills; no lethargy  Skin/Breast: see HPI  Ophthalmologic: no eye pain or changes in vision  ENT: no dysphagia or changes in hearing  Respiratory: no SOB or cough  Cardiovascular: no palpitations or chest pain  Gastrointestinal: no abdominal pain or blood in stool  Genitourinary: no dysuria or frequency  Musculoskeletal: no joint pains or weakness  Neurological: no weakness, numbness, or tingling    MEDICATIONS  (STANDING):  heparin   Injectable 5000 Unit(s) SubCutaneous every 8 hours  lactated ringers. 1000 milliLiter(s) (100 mL/Hr) IV Continuous <Continuous>    MEDICATIONS  (PRN):  hydrOXYzine hydrochloride 25 milliGRAM(s) Oral every 6 hours PRN Itching      Allergies    Keflex (Other)    Intolerances        SOCIAL HISTORY: No pertinent social history.    FAMILY HISTORY:  Family history of stroke  Family history of diabetes mellitus (DM) (Sibling)      Vital Signs Last 24 Hrs  T(C): 36.6 (26 May 2023 05:15), Max: 36.7 (26 May 2023 03:46)  T(F): 97.9 (26 May 2023 05:15), Max: 98 (26 May 2023 03:46)  HR: 77 (26 May 2023 05:15) (68 - 77)  BP: 143/81 (26 May 2023 05:15) (123/92 - 145/65)  BP(mean): --  RR: 20 (26 May 2023 05:15) (17 - 20)  SpO2: 99% (26 May 2023 05:15) (97% - 100%)    Parameters below as of 26 May 2023 05:15  Patient On (Oxygen Delivery Method): room air    PHYSICAL EXAM:  The patient was AOx3 and in NAD.  OP showed no ulcerations.  There was no visible LAD.  Conjunctiva were non-injected.  There was no clubbing or edema of extremities.  The scalp, hair, face, eyebrows, lips, OP, neck, chest, back, extremities x4, and nails were examined.  There was no hyperhidrosis or bromhidrosis.    Of note on skin exam:  Slight Erythema on face and cheeks  Not dermatographic  Diffuse xerosis    LABS:                        11.7   6.28  )-----------( 169      ( 26 May 2023 06:00 )             36.0         140  |  108<H>  |  14  ----------------------------<  116<H>  4.2   |  18<L>  |  1.85<H>    Ca    8.8      26 May 2023 06:00  Phos  2.6       Mg     2.10         TPro  7.0  /  Alb  4.1  /  TBili  4.3<H>  /  DBili  x   /  AST  248<H>  /  ALT  267<H>  /  AlkPhos  298<H>        Urinalysis Basic - ( 25 May 2023 22:10 )    Color: Yellow / Appearance: Slightly Turbid / S.008 / pH: x  Gluc: x / Ketone: Negative  / Bili: Negative / Urobili: <2 mg/dL   Blood: x / Protein: 30 mg/dL / Nitrite: Negative   Leuk Esterase: Negative / RBC: 0-2 /HPF / WBC 0-2 /HPF   Sq Epi: x / Non Sq Epi: x / Bacteria: Occasional   HPI:  Patient is a 82 year old F with T2DM, hypothyroidism, HTN, HLD, hx of diverticulitis, and recent UTI (completed keflex on ) presents for 3 days of diffuse pruritus. The patient reports that she was visiting her friend who was admitted at our hospital on  and began feeling pruritic that night. She reports persistent diffuse pruritus since then, not localized to any one particular part of her body. She denies any new rashes or skin lesions, and only endorses "redness" and excoriations after itching in a particular location. She denies any of the same erythema independent of active itching. She denies any new medications aside from the antibiotic which she completed the day prior to when her pruritus began. She also reports a decreased appetite and little to no po intake over the past 2 days as well as headaches since . She denies any abdominal pain, fevers, chills,     ED Course:  Vital signs stable (HR 77, /81, T 36.6C, stable on RA). Labs remarkable for eosinophils 6.2%, Cr of 1.85, TBili 4.5 (direct bili 3.2), , , and . CT A/P revealing only prior cholecystectomy and diverticulosis without diverticulitis. Patient received hydroxyzine with temporary relief of her symptoms.  (26 May 2023 07:19)    Dermatology consulted for pruritus x 3d. Patient reports she has hives on her face. Denies fevers, facial swelling, eye/vision changes, or abdominal pain.    PAST MEDICAL & SURGICAL HISTORY:  Diabetes mellitus      Dyslipidemia      Hypothyroidism      Neuropathy      Bilateral dry eyes      History of ectopic pregnancy      Hypertension      History of hysterectomy      S/P cholecystectomy  open cholecystectomy, post op retained stone, ERCP/sphincterotomy      History of appendectomy    REVIEW OF SYSTEMS:  General: no fevers/chills; no lethargy  Skin/Breast: see HPI  Ophthalmologic: no eye pain or changes in vision  ENT: no dysphagia or changes in hearing  Respiratory: no SOB or cough  Cardiovascular: no palpitations or chest pain  Gastrointestinal: no abdominal pain or blood in stool  Genitourinary: no dysuria or frequency  Musculoskeletal: no joint pains or weakness  Neurological: no weakness, numbness, or tingling    MEDICATIONS  (STANDING):  heparin   Injectable 5000 Unit(s) SubCutaneous every 8 hours  lactated ringers. 1000 milliLiter(s) (100 mL/Hr) IV Continuous <Continuous>    MEDICATIONS  (PRN):  hydrOXYzine hydrochloride 25 milliGRAM(s) Oral every 6 hours PRN Itching      Allergies    Keflex (Other)    Intolerances        SOCIAL HISTORY: No pertinent social history.    FAMILY HISTORY:  Family history of stroke  Family history of diabetes mellitus (DM) (Sibling)      Vital Signs Last 24 Hrs  T(C): 36.6 (26 May 2023 05:15), Max: 36.7 (26 May 2023 03:46)  T(F): 97.9 (26 May 2023 05:15), Max: 98 (26 May 2023 03:46)  HR: 77 (26 May 2023 05:15) (68 - 77)  BP: 143/81 (26 May 2023 05:15) (123/92 - 145/65)  BP(mean): --  RR: 20 (26 May 2023 05:15) (17 - 20)  SpO2: 99% (26 May 2023 05:15) (97% - 100%)    Parameters below as of 26 May 2023 05:15  Patient On (Oxygen Delivery Method): room air    PHYSICAL EXAM:  The patient was AOx3 and in NAD.  OP showed no ulcerations.  There was no visible LAD.  Conjunctiva were non-injected.  There was no clubbing or edema of extremities.  The scalp, hair, face, eyebrows, lips, OP, neck, chest, back, extremities x4, and nails were examined.  There was no hyperhidrosis or bromhidrosis.    Of note on skin exam:  mild Erythema on abdomen  excoriations on arms  Not dermatographic  Diffuse xerosis    LABS:                        11.7   6.28  )-----------( 169      ( 26 May 2023 06:00 )             36.0         140  |  108<H>  |  14  ----------------------------<  116<H>  4.2   |  18<L>  |  1.85<H>    Ca    8.8      26 May 2023 06:00  Phos  2.6       Mg     2.10         TPro  7.0  /  Alb  4.1  /  TBili  4.3<H>  /  DBili  x   /  AST  248<H>  /  ALT  267<H>  /  AlkPhos  298<H>        Urinalysis Basic - ( 25 May 2023 22:10 )    Color: Yellow / Appearance: Slightly Turbid / S.008 / pH: x  Gluc: x / Ketone: Negative  / Bili: Negative / Urobili: <2 mg/dL   Blood: x / Protein: 30 mg/dL / Nitrite: Negative   Leuk Esterase: Negative / RBC: 0-2 /HPF / WBC 0-2 /HPF   Sq Epi: x / Non Sq Epi: x / Bacteria: Occasional

## 2023-05-26 NOTE — H&P ADULT - ATTENDING COMMENTS
82 year old F with T2DM, hypothyroidism, HTN, HLD, recent UTI s/p short course of keflex now presenting w/ diffuse pruritus. Also found to have transaminitis and RACHEL on work up    #PRURITUS  -Presentation c/f DRESS syndrome given recent antibiotics, transaminitis and renal injury. Pt however w/o obvious rash on exam, no lymphadenopathy or fevers. Diff includes acute viral infection vs. bacteremia  -Resume atarax 25mg Q6H  -Triamcinolone BID  -IVF  -Obtain acute hep panel, HSV, HIV, Parvovirus, CMV, EBV  -Obtain blood Cx x 2  -Derm eval    #Transaminitis  -Suspect iso drug reaction  -R/o viral infection as above  -Monitor LFTs    #RACHEL  -Elevated sCR noted. Unclear baseline  -Suspect iso drug reaction  -Obtain urine lytes to calculate FENA  -Avoid nephrotoxins  -Monitor sCR     DIET: Regular  DVT: HSQ  DISPO: Likely home

## 2023-05-26 NOTE — H&P ADULT - NSHPSOCIALHISTORY_GEN_ALL_CORE
Patient lives at home with family, is independent in all of her IADLs. Denies any smoking, alcohol, or other drug use.

## 2023-05-26 NOTE — H&P ADULT - TIME BILLING
reviewing laboratory data, consultants' recommendations, documentation in [place], performing medically appropriate examinations/evaluations, discussion with patient/family/RN/ACP/Residents and interdisciplinary staff (such as , social workers, etc), counseling patient/family/care giver, ordering medically appropriate medication, tests, or procedures

## 2023-05-26 NOTE — H&P ADULT - ASSESSMENT
82 year old F with T2DM, hypothyroidism, HTN, HLD, recent UTI s/p course of __, presents for 2 days of diffuse pruritus and maculopapular rash. Patient incidentally found to have transaminitis consistent with cholestatic pattern of injury, along with acute renal injury. 82 year old F with T2DM, hypothyroidism, HTN, HLD, recent UTI s/p course of keflex 5 days prior, presents for 3 days of diffuse pruritus. Patient incidentally found to have transaminitis consistent with cholestatic pattern of injury, along with acute renal injury. 82 year old F with T2DM, hypothyroidism, HTN, HLD, recent UTI s/p course of keflex 5 days prior, presents for 3 days of diffuse pruritus without obvious rash. Patient incidentally found to have transaminitis consistent with mixed hepatocellular/cholestatic pattern of injury, along with acute renal injury.

## 2023-05-26 NOTE — CONSULT NOTE ADULT - ATTENDING COMMENTS
Patient with generalized pruritus. Both kidney and liver disease may be potential sources for generalized pruritus. Treatment of pruritus will be difficult in the inpatient setting, unless the underlying kidney/liver disease is corrected. May trial topical corticosteroids though I do not suspect this will be very efficacious. Would be cautious about antihistamine use as the risks of sedation/dizziness/anticholinergic effects may outweigh the benefits. The patient does not have evidence of a type I hypersensitivity reaction.    Suggest further workup of the RACHEL and transaminitis/hyperbilirubinemia. Pending further evaluation, other treatments may be considered.     Ricky Chowdhury MD, PharmD, MPH  Co-Director, Inpatient Dermatology Consultation Service, [facility] Patient with generalized pruritus. Both kidney and liver disease may be potential sources for generalized pruritus. Treatment of pruritus will be difficult in the inpatient setting, unless the underlying kidney/liver disease is corrected. May trial topical corticosteroids though I do not suspect this will be very efficacious. Would be cautious about antihistamine use as the risks of sedation/dizziness/anticholinergic effects may outweigh the benefits. The patient does not have evidence of a type I hypersensitivity reaction. Drug-induced hypersensitivity syndrome (DIHS AKA DRESS syndrome) is unlikely given the lack of fever, characteristic rash, and peripheral eosinophilia. Recent drug exposures noted. Please inform dermatology if there any changes on the skin. Rashes may evolve over time.     Suggest further workup of the RACHEL and transaminitis/hyperbilirubinemia. Pending further evaluation, other treatments may be considered.     Ricky Chowdhury MD, PharmD, MPH  Co-Director, Inpatient Dermatology Consultation Service, [facility]

## 2023-05-26 NOTE — H&P ADULT - NSHPPHYSICALEXAM_GEN_ALL_CORE
VITALS:   T(C): 36.6 (05-26-23 @ 05:15), Max: 36.7 (05-26-23 @ 03:46)  HR: 77 (05-26-23 @ 05:15) (68 - 77)  BP: 143/81 (05-26-23 @ 05:15) (123/92 - 145/65)  RR: 20 (05-26-23 @ 05:15) (17 - 20)  SpO2: 99% (05-26-23 @ 05:15) (97% - 100%)    GENERAL: NAD, itching diffusely throughout exam  HEAD:  Atraumatic, normocephalic  EYES: EOMI, PERRLA, conjunctiva and sclera clear  ENT: +sublingual jaundice; moist mucous membranes  NECK: Supple, no JVD  HEART: Regular rate and rhythm, no murmurs, rubs, or gallops  LUNGS: Unlabored respirations.  Clear to auscultation bilaterally, no crackles, wheezing, or rhonchi  ABDOMEN: Soft, nontender, nondistended, +BS  EXTREMITIES: 2+ peripheral pulses bilaterally. No clubbing, cyanosis, or edema  NERVOUS SYSTEM:  A&Ox3, no focal deficits   SKIN: Patient itching throughout exam; excoriations noted as patient is itching, diffusely dry skin with some scaling and skin discoloration, some faint skin colored macular lesions noted which pt reports are chronic, no other rashes or lesions VITALS:   T(C): 36.6 (05-26-23 @ 05:15), Max: 36.7 (05-26-23 @ 03:46)  HR: 77 (05-26-23 @ 05:15) (68 - 77)  BP: 143/81 (05-26-23 @ 05:15) (123/92 - 145/65)  RR: 20 (05-26-23 @ 05:15) (17 - 20)  SpO2: 99% (05-26-23 @ 05:15) (97% - 100%)    GENERAL: NAD, itching diffusely throughout exam  HEAD:  Atraumatic, normocephalic  EYES: EOMI, PERRLA, conjunctiva and sclera clear  ENT: +sublingual jaundice; moist mucous membranes  NECK: Supple, no JVD  HEART: Regular rate and rhythm, no murmurs, rubs, or gallops  LUNGS: Unlabored respirations.  Clear to auscultation bilaterally, no crackles, wheezing, or rhonchi  ABDOMEN: Soft, nontender, nondistended, +BS  EXTREMITIES: 2+ peripheral pulses bilaterally. No clubbing, cyanosis, or edema  NERVOUS SYSTEM:  A&Ox3, no focal deficits   SKIN: Patient itching throughout exam; excoriations noted as patient is itching, diffusely dry skin with some scaling and skin discoloration primarily along neck and upper back, some faint skin colored macular lesions noted which pt reports are chronic, no other rashes or lesions

## 2023-05-26 NOTE — ED ADULT NURSE REASSESSMENT NOTE - NS ED NURSE REASSESS COMMENT FT1
pt baseline mental status RR even unlabored completing full sentences. pt ambulatory to BR, steady gait noted. comfortable at this time, no new complaints. stretcher lowest position siderails up safety measures in place

## 2023-05-27 LAB
ALBUMIN SERPL ELPH-MCNC: 3.8 G/DL — SIGNIFICANT CHANGE UP (ref 3.3–5)
ALP SERPL-CCNC: 291 U/L — HIGH (ref 40–120)
ALT FLD-CCNC: 266 U/L — HIGH (ref 4–33)
ANA PAT FLD IF-IMP: ABNORMAL
ANA TITR SER: ABNORMAL
ANION GAP SERPL CALC-SCNC: 12 MMOL/L — SIGNIFICANT CHANGE UP (ref 7–14)
AST SERPL-CCNC: 257 U/L — HIGH (ref 4–32)
B19V IGG SER-ACNC: 0.77 INDEX — SIGNIFICANT CHANGE UP (ref 0–0.9)
B19V IGG+IGM SER-IMP: NEGATIVE — SIGNIFICANT CHANGE UP
B19V IGG+IGM SER-IMP: SIGNIFICANT CHANGE UP
B19V IGM FLD-ACNC: 0.13 INDEX — SIGNIFICANT CHANGE UP (ref 0–0.9)
B19V IGM SER-ACNC: NEGATIVE — SIGNIFICANT CHANGE UP
BASOPHILS # BLD AUTO: 0.05 K/UL — SIGNIFICANT CHANGE UP (ref 0–0.2)
BASOPHILS NFR BLD AUTO: 1 % — SIGNIFICANT CHANGE UP (ref 0–2)
BILIRUB DIRECT SERPL-MCNC: 3 MG/DL — HIGH (ref 0–0.3)
BILIRUB SERPL-MCNC: 3.7 MG/DL — HIGH (ref 0.2–1.2)
BUN SERPL-MCNC: 10 MG/DL — SIGNIFICANT CHANGE UP (ref 7–23)
CALCIUM SERPL-MCNC: 9.1 MG/DL — SIGNIFICANT CHANGE UP (ref 8.4–10.5)
CHLORIDE SERPL-SCNC: 109 MMOL/L — HIGH (ref 98–107)
CO2 SERPL-SCNC: 20 MMOL/L — LOW (ref 22–31)
CREAT SERPL-MCNC: 1.43 MG/DL — HIGH (ref 0.5–1.3)
EBV EA AB SER IA-ACNC: <5 U/ML — SIGNIFICANT CHANGE UP
EBV EA AB TITR SER IF: POSITIVE
EBV EA IGG SER-ACNC: NEGATIVE — SIGNIFICANT CHANGE UP
EBV NA IGG SER IA-ACNC: >600 U/ML — HIGH
EBV PATRN SPEC IB-IMP: SIGNIFICANT CHANGE UP
EBV VCA IGG AVIDITY SER QL IA: POSITIVE
EBV VCA IGM SER IA-ACNC: 15.1 U/ML — SIGNIFICANT CHANGE UP
EBV VCA IGM SER IA-ACNC: 711 U/ML — HIGH
EBV VCA IGM TITR FLD: NEGATIVE — SIGNIFICANT CHANGE UP
EGFR: 36 ML/MIN/1.73M2 — LOW
EOSINOPHIL # BLD AUTO: 0.36 K/UL — SIGNIFICANT CHANGE UP (ref 0–0.5)
EOSINOPHIL NFR BLD AUTO: 7.1 % — HIGH (ref 0–6)
GLUCOSE SERPL-MCNC: 102 MG/DL — HIGH (ref 70–99)
HCT VFR BLD CALC: 34.8 % — SIGNIFICANT CHANGE UP (ref 34.5–45)
HGB BLD-MCNC: 11.3 G/DL — LOW (ref 11.5–15.5)
IANC: 2.54 K/UL — SIGNIFICANT CHANGE UP (ref 1.8–7.4)
IMM GRANULOCYTES NFR BLD AUTO: 0.2 % — SIGNIFICANT CHANGE UP (ref 0–0.9)
LYMPHOCYTES # BLD AUTO: 1.8 K/UL — SIGNIFICANT CHANGE UP (ref 1–3.3)
LYMPHOCYTES # BLD AUTO: 35.5 % — SIGNIFICANT CHANGE UP (ref 13–44)
MAGNESIUM SERPL-MCNC: 1.9 MG/DL — SIGNIFICANT CHANGE UP (ref 1.6–2.6)
MCHC RBC-ENTMCNC: 29.4 PG — SIGNIFICANT CHANGE UP (ref 27–34)
MCHC RBC-ENTMCNC: 32.5 GM/DL — SIGNIFICANT CHANGE UP (ref 32–36)
MCV RBC AUTO: 90.4 FL — SIGNIFICANT CHANGE UP (ref 80–100)
MONOCYTES # BLD AUTO: 0.31 K/UL — SIGNIFICANT CHANGE UP (ref 0–0.9)
MONOCYTES NFR BLD AUTO: 6.1 % — SIGNIFICANT CHANGE UP (ref 2–14)
NEUTROPHILS # BLD AUTO: 2.54 K/UL — SIGNIFICANT CHANGE UP (ref 1.8–7.4)
NEUTROPHILS NFR BLD AUTO: 50.1 % — SIGNIFICANT CHANGE UP (ref 43–77)
NRBC # BLD: 0 /100 WBCS — SIGNIFICANT CHANGE UP (ref 0–0)
NRBC # FLD: 0 K/UL — SIGNIFICANT CHANGE UP (ref 0–0)
PHOSPHATE SERPL-MCNC: 2.5 MG/DL — SIGNIFICANT CHANGE UP (ref 2.5–4.5)
PLATELET # BLD AUTO: 162 K/UL — SIGNIFICANT CHANGE UP (ref 150–400)
POTASSIUM SERPL-MCNC: 4.1 MMOL/L — SIGNIFICANT CHANGE UP (ref 3.5–5.3)
POTASSIUM SERPL-SCNC: 4.1 MMOL/L — SIGNIFICANT CHANGE UP (ref 3.5–5.3)
PROT SERPL-MCNC: 6.6 G/DL — SIGNIFICANT CHANGE UP (ref 6–8.3)
RBC # BLD: 3.85 M/UL — SIGNIFICANT CHANGE UP (ref 3.8–5.2)
RBC # FLD: 12.9 % — SIGNIFICANT CHANGE UP (ref 10.3–14.5)
SODIUM SERPL-SCNC: 141 MMOL/L — SIGNIFICANT CHANGE UP (ref 135–145)
WBC # BLD: 5.07 K/UL — SIGNIFICANT CHANGE UP (ref 3.8–10.5)
WBC # FLD AUTO: 5.07 K/UL — SIGNIFICANT CHANGE UP (ref 3.8–10.5)

## 2023-05-27 PROCEDURE — 99232 SBSQ HOSP IP/OBS MODERATE 35: CPT | Mod: GC

## 2023-05-27 RX ORDER — LANOLIN ALCOHOL/MO/W.PET/CERES
3 CREAM (GRAM) TOPICAL AT BEDTIME
Refills: 0 | Status: DISCONTINUED | OUTPATIENT
Start: 2023-05-27 | End: 2023-06-01

## 2023-05-27 RX ADMIN — HEPARIN SODIUM 5000 UNIT(S): 5000 INJECTION INTRAVENOUS; SUBCUTANEOUS at 01:59

## 2023-05-27 RX ADMIN — HEPARIN SODIUM 5000 UNIT(S): 5000 INJECTION INTRAVENOUS; SUBCUTANEOUS at 17:01

## 2023-05-27 RX ADMIN — Medication 3 MILLIGRAM(S): at 21:16

## 2023-05-27 RX ADMIN — Medication 1 APPLICATION(S): at 17:01

## 2023-05-27 RX ADMIN — HEPARIN SODIUM 5000 UNIT(S): 5000 INJECTION INTRAVENOUS; SUBCUTANEOUS at 09:29

## 2023-05-27 RX ADMIN — Medication 1 APPLICATION(S): at 06:41

## 2023-05-27 NOTE — PROGRESS NOTE ADULT - PROBLEM SELECTOR PLAN 7
on home amlodipine and losartan  - hold losartan given RACHEL  - BP within acceptable ranges during admission    Plan:  > continue with home amlodipine

## 2023-05-27 NOTE — PROGRESS NOTE ADULT - PROBLEM SELECTOR PLAN 1
Patient presenting with 3 days of diffuse pruritus, without reported rash  - given evidence of systemic injury, including transaminitis and acute renal injury, eosinophilia (increasing, albeit mild) noted on diff, must rule out DIHS/DRESS  - other differential includes cholestasis induced pruritus or other mild adverse drug reaction    Plan:  > symptomatic control with atarax q6h prn  > derm consulted, follow up recs to rule out DRESS  > can consider calamine lotion and topical steroids after eval by derm  > can consider cholestyramine for relief if refractory symptoms given bili may be contributory

## 2023-05-27 NOTE — PROGRESS NOTE ADULT - SUBJECTIVE AND OBJECTIVE BOX
PROGRESS NOTE:     Patient is a 82y old  Female who presents with a chief complaint of Transaminitis, RACHEL, Pruritus (26 May 2023 12:54)      ---------------------------------------------------  Stewart Early  PGY-1, Internal Medicine  Available on [employer] Teams  Pager: [phone] ([facility]), or [phone] ([facility])  ---------------------------------------------------    INTERVAL / OVERNIGHT EVENTS:  No acute events overnight.     SUBJECTIVE:  Patient examined at bedside with no acute complaints.       BRIEF DAILY PLAN:    Pain:  Bowel Movements:  Urination:  OOB:  PT:    REVIEW OF SYSTEMS:    CONSTITUTIONAL: No weakness, fevers or chills  EYES/ENT: No visual changes;  No vertigo or throat pain   NECK: No pain or stiffness  RESPIRATORY: No cough, wheezing, hemoptysis; No shortness of breath  CARDIOVASCULAR: No chest pain or palpitations  GASTROINTESTINAL: No abdominal or epigastric pain. No nausea, vomiting, or hematemesis; No diarrhea or constipation. No melena or hematochezia.  GENITOURINARY: No dysuria, frequency or hematuria  NEUROLOGICAL: No numbness or weakness  SKIN: No itching, rashes      MEDICATIONS  (STANDING):  heparin   Injectable 5000 Unit(s) SubCutaneous every 8 hours  lactated ringers. 1000 milliLiter(s) (100 mL/Hr) IV Continuous <Continuous>  triamcinolone 0.1% Ointment 1 Application(s) Topical every 12 hours    MEDICATIONS  (PRN):      CAPILLARY BLOOD GLUCOSE        I&O's Summary    26 May 2023 07:01  -  27 May 2023 07:00  --------------------------------------------------------  IN: 400 mL / OUT: 0 mL / NET: 400 mL        VITALS:   T(C): 36.8 (05-27-23 @ 05:00), Max: 37.1 (23 @ 13:14)  HR: 76 (23 @ 05:00) (69 - 86)  BP: 164/67 (23 @ 05:00) (130/51 - 164/67)  RR: 17 (23 @ 05:00) (17 - 19)  SpO2: 100% (23 @ 05:00) (96% - 100%)    GENERAL: NAD, lying in bed comfortably  HEAD:  Atraumatic, normocephalic  EYES: EOMI, PERRLA, conjunctiva and sclera clear  ENT: Moist mucous membranes  NECK: Supple, no JVD  HEART: Regular rate and rhythm, no murmurs, rubs, or gallops  LUNGS: Unlabored respirations.  Clear to auscultation bilaterally, no crackles, wheezing, or rhonchi  ABDOMEN: Soft, nontender, nondistended, +BS  EXTREMITIES: 2+ peripheral pulses bilaterally. No clubbing, cyanosis, or edema  NERVOUS SYSTEM:  A&Ox3, no focal deficits   SKIN: No rashes or lesions    LABS:                        11.3   5.07  )-----------( 162      ( 27 May 2023 06:09 )             34.8         140  |  108<H>  |  14  ----------------------------<  116<H>  4.2   |  18<L>  |  1.85<H>    Ca    8.8      26 May 2023 06:00  Phos  2.6       Mg     2.10         TPro  7.0  /  Alb  4.1  /  TBili  4.3<H>  /  DBili  x   /  AST  248<H>  /  ALT  267<H>  /  AlkPhos  298<H>            Urinalysis Basic - ( 25 May 2023 22:10 )    Color: Yellow / Appearance: Slightly Turbid / S.008 / pH: x  Gluc: x / Ketone: Negative  / Bili: Negative / Urobili: <2 mg/dL   Blood: x / Protein: 30 mg/dL / Nitrite: Negative   Leuk Esterase: Negative / RBC: 0-2 /HPF / WBC 0-2 /HPF   Sq Epi: x / Non Sq Epi: x / Bacteria: Occasional        Culture - Urine (collected 25 May 2023 22:02)  Source: Clean Catch Clean Catch (Midstream)  Final Report (26 May 2023 22:54):    <10,000 CFU/mL Normal Urogenital Jessica        RADIOLOGY & ADDITIONAL TESTS:  Results Reviewed:   Imaging Personally Reviewed:  Electrocardiogram Personally Reviewed:    COORDINATION OF CARE:  Care Discussed with Consultants/Other Providers [Y/N]:  Prior or Outpatient Records Reviewed [Y/N]:     PROGRESS NOTE:     Patient is a 82y old  Female who presents with a chief complaint of Transaminitis, RACHEL, Pruritus (26 May 2023 12:54)      ---------------------------------------------------  Stewart Early  PGY-1, Internal Medicine  Available on [employer] Teams  Pager: [phone] ([facility]), or [phone] ([facility])  ---------------------------------------------------    INTERVAL / OVERNIGHT EVENTS:  - No acute events overnight    SUBJECTIVE:  - Patient examined at bedside with no acute complaints.     BRIEF DAILY PLAN:  - Continue to monitor labs, further workup re transaminitis  - Symptomatic control for pruritus       MEDICATIONS  (STANDING):  heparin   Injectable 5000 Unit(s) SubCutaneous every 8 hours  lactated ringers. 1000 milliLiter(s) (100 mL/Hr) IV Continuous <Continuous>  triamcinolone 0.1% Ointment 1 Application(s) Topical every 12 hours    MEDICATIONS  (PRN):      CAPILLARY BLOOD GLUCOSE        I&O's Summary    26 May 2023 07:01  -  27 May 2023 07:00  --------------------------------------------------------  IN: 400 mL / OUT: 0 mL / NET: 400 mL        VITALS:   T(C): 36.8 (23 @ 05:00), Max: 37.1 (23 @ 13:14)  HR: 76 (23 @ 05:00) (69 - 86)  BP: 164/67 (23 @ 05:00) (130/51 - 164/67)  RR: 17 (23 @ 05:00) (17 - 19)  SpO2: 100% (23 @ 05:00) (96% - 100%)      GENERAL: NAD, itching diffusely throughout exam  ENT: +sublingual jaundice; moist mucous membranes  HEART: Regular rate and rhythm, no murmurs, rubs, or gallops  LUNGS: Unlabored respirations.  Clear to auscultation bilaterally, no crackles, wheezing, or rhonchi  ABDOMEN: Soft, nontender, nondistended, +BS  NERVOUS SYSTEM:  A&Ox3, no focal deficits   SKIN: Patient itching throughout exam; excoriations noted as patient is itching, diffusely dry skin with some scaling and skin discoloration primarily along neck and upper back, some faint skin colored macular lesions noted which pt reports are chronic, no other rashes or lesions      LABS:                        11.3   5.07  )-----------( 162      ( 27 May 2023 06:09 )             34.8         140  |  108<H>  |  14  ----------------------------<  116<H>  4.2   |  18<L>  |  1.85<H>    Ca    8.8      26 May 2023 06:00  Phos  2.6       Mg     2.10         TPro  7.0  /  Alb  4.1  /  TBili  4.3<H>  /  DBili  x   /  AST  248<H>  /  ALT  267<H>  /  AlkPhos  298<H>        Urinalysis Basic - ( 25 May 2023 22:10 )    Color: Yellow / Appearance: Slightly Turbid / S.008 / pH: x  Gluc: x / Ketone: Negative  / Bili: Negative / Urobili: <2 mg/dL   Blood: x / Protein: 30 mg/dL / Nitrite: Negative   Leuk Esterase: Negative / RBC: 0-2 /HPF / WBC 0-2 /HPF   Sq Epi: x / Non Sq Epi: x / Bacteria: Occasional      Culture - Urine (collected 25 May 2023 22:02)  Source: Clean Catch Clean Catch (Midstream)  Final Report (26 May 2023 22:54):    <10,000 CFU/mL Normal Urogenital Jessica

## 2023-05-27 NOTE — PROGRESS NOTE ADULT - ASSESSMENT
82 year old F with T2DM, hypothyroidism, HTN, HLD, recent UTI s/p course of keflex 5 days prior, presents for 3 days of diffuse pruritus without obvious rash. Patient incidentally found to have transaminitis consistent with mixed hepatocellular/cholestatic pattern of injury, along with acute renal injury.

## 2023-05-27 NOTE — PROGRESS NOTE ADULT - PROBLEM SELECTOR PLAN 8
DVT PPx: heparin subq  Diet: regular  Code: FULL  Dispo: pending improvement  Communication: discussed with family at bedside 5/26 AM    Discharge information:  Pharmacy:   PCP: Dr. Adelita Gilmore

## 2023-05-28 DIAGNOSIS — E87.29 OTHER ACIDOSIS: ICD-10-CM

## 2023-05-28 LAB
ALBUMIN SERPL ELPH-MCNC: 3.7 G/DL — SIGNIFICANT CHANGE UP (ref 3.3–5)
ALP SERPL-CCNC: 296 U/L — HIGH (ref 40–120)
ALT FLD-CCNC: 253 U/L — HIGH (ref 4–33)
ANION GAP SERPL CALC-SCNC: 17 MMOL/L — HIGH (ref 7–14)
AST SERPL-CCNC: 238 U/L — HIGH (ref 4–32)
B-OH-BUTYR SERPL-SCNC: 0.4 MMOL/L — SIGNIFICANT CHANGE UP (ref 0–0.4)
BASE EXCESS BLDV CALC-SCNC: -2 MMOL/L — SIGNIFICANT CHANGE UP (ref -2–3)
BILIRUB SERPL-MCNC: 3.8 MG/DL — HIGH (ref 0.2–1.2)
BUN SERPL-MCNC: 9 MG/DL — SIGNIFICANT CHANGE UP (ref 7–23)
CA-I SERPL-SCNC: 1.24 MMOL/L — SIGNIFICANT CHANGE UP (ref 1.15–1.33)
CALCIUM SERPL-MCNC: 9.2 MG/DL — SIGNIFICANT CHANGE UP (ref 8.4–10.5)
CHLORIDE BLDV-SCNC: 105 MMOL/L — SIGNIFICANT CHANGE UP (ref 96–108)
CHLORIDE SERPL-SCNC: 106 MMOL/L — SIGNIFICANT CHANGE UP (ref 98–107)
CO2 BLDV-SCNC: 24.3 MMOL/L — SIGNIFICANT CHANGE UP (ref 22–26)
CO2 SERPL-SCNC: 15 MMOL/L — LOW (ref 22–31)
CREAT SERPL-MCNC: 1.27 MG/DL — SIGNIFICANT CHANGE UP (ref 0.5–1.3)
EGFR: 42 ML/MIN/1.73M2 — LOW
GAS PNL BLDV: 136 MMOL/L — SIGNIFICANT CHANGE UP (ref 136–145)
GAS PNL BLDV: SIGNIFICANT CHANGE UP
GLUCOSE BLDC GLUCOMTR-MCNC: 103 MG/DL — HIGH (ref 70–99)
GLUCOSE BLDC GLUCOMTR-MCNC: 140 MG/DL — HIGH (ref 70–99)
GLUCOSE BLDC GLUCOMTR-MCNC: 144 MG/DL — HIGH (ref 70–99)
GLUCOSE BLDC GLUCOMTR-MCNC: 184 MG/DL — HIGH (ref 70–99)
GLUCOSE BLDV-MCNC: 153 MG/DL — HIGH (ref 70–99)
GLUCOSE SERPL-MCNC: 99 MG/DL — SIGNIFICANT CHANGE UP (ref 70–99)
HCO3 BLDV-SCNC: 23 MMOL/L — SIGNIFICANT CHANGE UP (ref 22–29)
HCT VFR BLD CALC: 35.4 % — SIGNIFICANT CHANGE UP (ref 34.5–45)
HCT VFR BLDA CALC: 38 % — SIGNIFICANT CHANGE UP (ref 34.5–46.5)
HGB BLD CALC-MCNC: 12.7 G/DL — SIGNIFICANT CHANGE UP (ref 11.7–16.1)
HGB BLD-MCNC: 11.6 G/DL — SIGNIFICANT CHANGE UP (ref 11.5–15.5)
LACTATE BLDV-MCNC: 1.8 MMOL/L — SIGNIFICANT CHANGE UP (ref 0.5–2)
MAGNESIUM SERPL-MCNC: 1.9 MG/DL — SIGNIFICANT CHANGE UP (ref 1.6–2.6)
MCHC RBC-ENTMCNC: 29.6 PG — SIGNIFICANT CHANGE UP (ref 27–34)
MCHC RBC-ENTMCNC: 32.8 GM/DL — SIGNIFICANT CHANGE UP (ref 32–36)
MCV RBC AUTO: 90.3 FL — SIGNIFICANT CHANGE UP (ref 80–100)
NRBC # BLD: 0 /100 WBCS — SIGNIFICANT CHANGE UP (ref 0–0)
NRBC # FLD: 0 K/UL — SIGNIFICANT CHANGE UP (ref 0–0)
OSMOLALITY UR: 245 MOSM/KG — SIGNIFICANT CHANGE UP (ref 50–1200)
PCO2 BLDV: 40 MMHG — SIGNIFICANT CHANGE UP (ref 39–52)
PH BLDV: 7.37 — SIGNIFICANT CHANGE UP (ref 7.32–7.43)
PHOSPHATE SERPL-MCNC: 3.1 MG/DL — SIGNIFICANT CHANGE UP (ref 2.5–4.5)
PLATELET # BLD AUTO: 155 K/UL — SIGNIFICANT CHANGE UP (ref 150–400)
PO2 BLDV: 49 MMHG — HIGH (ref 25–45)
POTASSIUM BLDV-SCNC: 4.4 MMOL/L — SIGNIFICANT CHANGE UP (ref 3.5–5.1)
POTASSIUM SERPL-MCNC: 4.4 MMOL/L — SIGNIFICANT CHANGE UP (ref 3.5–5.3)
POTASSIUM SERPL-SCNC: 4.4 MMOL/L — SIGNIFICANT CHANGE UP (ref 3.5–5.3)
PROT SERPL-MCNC: 6.5 G/DL — SIGNIFICANT CHANGE UP (ref 6–8.3)
RBC # BLD: 3.92 M/UL — SIGNIFICANT CHANGE UP (ref 3.8–5.2)
RBC # FLD: 13.1 % — SIGNIFICANT CHANGE UP (ref 10.3–14.5)
SAO2 % BLDV: 82.6 % — SIGNIFICANT CHANGE UP (ref 67–88)
SODIUM SERPL-SCNC: 138 MMOL/L — SIGNIFICANT CHANGE UP (ref 135–145)
WBC # BLD: 5.93 K/UL — SIGNIFICANT CHANGE UP (ref 3.8–10.5)
WBC # FLD AUTO: 5.93 K/UL — SIGNIFICANT CHANGE UP (ref 3.8–10.5)

## 2023-05-28 PROCEDURE — 99232 SBSQ HOSP IP/OBS MODERATE 35: CPT | Mod: GC

## 2023-05-28 RX ADMIN — Medication 1 APPLICATION(S): at 05:51

## 2023-05-28 RX ADMIN — Medication 3 MILLIGRAM(S): at 22:18

## 2023-05-28 RX ADMIN — HEPARIN SODIUM 5000 UNIT(S): 5000 INJECTION INTRAVENOUS; SUBCUTANEOUS at 00:32

## 2023-05-28 RX ADMIN — HEPARIN SODIUM 5000 UNIT(S): 5000 INJECTION INTRAVENOUS; SUBCUTANEOUS at 08:41

## 2023-05-28 RX ADMIN — Medication 1 APPLICATION(S): at 19:00

## 2023-05-28 RX ADMIN — HEPARIN SODIUM 5000 UNIT(S): 5000 INJECTION INTRAVENOUS; SUBCUTANEOUS at 17:26

## 2023-05-28 NOTE — PROGRESS NOTE ADULT - PROBLEM SELECTOR PLAN 3
Patient with new RACHEL, baseline Cr 1.0  - patient reports little to no po intake over the past 2 days due to dec appetite  - mildly improved with IVF bolus in the ED  - urinating appropriately, no concerns for obstructive etiology    Plan:  > maintenance IVF  > follow up urine studies  > if worsening, can consider renal US New transaminitis, reportedly normal on outpatient lab 4/2023  - presenting with new mixed cholestatic and hepatocellular acute injury pattern  - unclear etiology, but given conjunction of symptoms with rash, RACHEL, and eosinophilia must rule out DRESS  - workup thus far has been negative for tylenol tox, less likely from   - given mild-mod degree of transaminitis ddx includes   - mildly improved since admission  - RUQ US remarkable only for hepatic steatosis    Plan:  > follow up infectious workup for viral hepatitis  > continue to monitor  > if worsening, will consider further workup including autoimmune labs

## 2023-05-28 NOTE — PROGRESS NOTE ADULT - PROBLEM SELECTOR PLAN 4
Plan:  > continue with home 75 mcg synthroid Patient with new RACHEL, baseline Cr 1.0  - patient reports little to no po intake over the past 2 days due to dec appetite  - mildly improved with IVF bolus in the ED  - urinating appropriately, no concerns for obstructive etiology    Plan:  > maintenance IVF  > follow up urine studies  > if worsening, can consider renal US

## 2023-05-28 NOTE — PROGRESS NOTE ADULT - PROBLEM SELECTOR PLAN 5
Patient previously on orals, now only diet controlled T2DM  - A1c 4/2023 5.7  - well controlled    Plan:  > low dose ISS Plan:  > continue with home 75 mcg synthroid

## 2023-05-28 NOTE — PROGRESS NOTE ADULT - PROBLEM SELECTOR PLAN 8
DVT PPx: heparin subq  Diet: regular  Code: FULL  Dispo: pending improvement  Communication: discussed with family at bedside 5/26 AM    Discharge information:  Pharmacy:   PCP: Dr. Adelita Gilmore on home amlodipine and losartan  - hold losartan given RACHEL  - BP within acceptable ranges during admission    Plan:  > continue with home amlodipine

## 2023-05-28 NOTE — PROGRESS NOTE ADULT - PROBLEM SELECTOR PLAN 1
Patient presenting with 3 days of diffuse pruritus, without reported rash  - given evidence of systemic injury, including transaminitis and acute renal injury, eosinophilia (increasing, albeit mild) noted on diff, must rule out DIHS/DRESS  - other differential includes cholestasis induced pruritus or other mild adverse drug reaction    Plan:  > symptomatic control with atarax q6h prn  > derm consulted, follow up recs to rule out DRESS  > can consider calamine lotion and topical steroids after eval by derm  > can consider cholestyramine for relief if refractory symptoms given bili may be contributory On AM labs 5/28, with on evident cause (RACHEL not so significant, no known ingestion, no BHB elevation, etc)  - f/u VBG to ensure normal pH, no lactate elevation  - f/u urine, serum osm

## 2023-05-28 NOTE — PROGRESS NOTE ADULT - PROBLEM SELECTOR PLAN 7
on home amlodipine and losartan  - hold losartan given RACHEL  - BP within acceptable ranges during admission    Plan:  > continue with home amlodipine Outpt labs revealing hypertriglyceridemia and dyslipidemia  - on atorvastatin 40 mg qd for several years, not the etiology of transaminitis    > hold home statin

## 2023-05-28 NOTE — PROGRESS NOTE ADULT - SUBJECTIVE AND OBJECTIVE BOX
***INCOMPLETE***    Bipin Balderas, PGY-2  Internal Medicine  Pager: [phone], [facility]: [phone]    PROGRESS NOTE:     SUBJECTIVE / OVERNIGHT EVENTS: No acute events overnight. ROS negative for fever, chills, cough, SOB, chest pain, nausea, vomiting, diarrhea, constipation, dysuria.    MEDICATIONS  (STANDING):  heparin   Injectable 5000 Unit(s) SubCutaneous every 8 hours  lactated ringers. 1000 milliLiter(s) (100 mL/Hr) IV Continuous <Continuous>  melatonin 3 milliGRAM(s) Oral at bedtime  triamcinolone 0.1% Ointment 1 Application(s) Topical every 12 hours    MEDICATIONS  (PRN):      CAPILLARY BLOOD GLUCOSE        I&O's Summary      PHYSICAL EXAM:  Vital Signs Last 24 Hrs  T(C): 36.6 (28 May 2023 05:03), Max: 36.6 (27 May 2023 22:23)  T(F): 97.9 (28 May 2023 05:03), Max: 97.9 (27 May 2023 22:23)  HR: 75 (28 May 2023 05:03) (69 - 75)  BP: 123/70 (28 May 2023 05:03) (123/70 - 145/71)  BP(mean): --  RR: 17 (28 May 2023 05:03) (17 - 17)  SpO2: 100% (28 May 2023 05:03) (100% - 100%)    Parameters below as of 28 May 2023 05:03  Patient On (Oxygen Delivery Method): room air        GENERAL: NAD, itching diffusely throughout exam  ENT: +sublingual jaundice; moist mucous membranes  HEART: Regular rate and rhythm, no murmurs, rubs, or gallops  LUNGS: Unlabored respirations.  Clear to auscultation bilaterally, no crackles, wheezing, or rhonchi  ABDOMEN: Soft, nontender, nondistended, +BS  NERVOUS SYSTEM:  A&Ox3, no focal deficits   SKIN: Patient itching throughout exam; excoriations noted as patient is itching, diffusely dry skin with some scaling and skin discoloration primarily along neck and upper back, some faint skin colored macular lesions noted which pt reports are chronic, no other rashes or lesions      LABS:                        11.3   5.07  )-----------( 162      ( 27 May 2023 06:09 )             34.8     05-27    141  |  109<H>  |  10  ----------------------------<  102<H>  4.1   |  20<L>  |  1.43<H>    Ca    9.1      27 May 2023 06:09  Phos  2.5     05-27  Mg     1.90     05-27    TPro  6.6  /  Alb  3.8  /  TBili  3.7<H>  /  DBili  3.0<H>  /  AST  257<H>  /  ALT  266<H>  /  AlkPhos  291<H>  05-27              Culture - Blood (collected 26 May 2023 11:40)  Source: .Blood Blood-Peripheral  Preliminary Report (27 May 2023 15:09):    No growth to date.    Culture - Blood (collected 26 May 2023 11:40)  Source: .Blood Blood-Peripheral  Preliminary Report (27 May 2023 15:09):    No growth to date.    Culture - Urine (collected 25 May 2023 22:02)  Source: Clean Catch Clean Catch (Midstream)  Final Report (26 May 2023 22:54):    <10,000 CFU/mL Normal Urogenital Jessica         Bipin Balderas, PGY-2  Internal Medicine  Pager: [phone], J: [phone]    PROGRESS NOTE:     SUBJECTIVE / OVERNIGHT EVENTS: No acute events overnight. Still with significant pruritus. Otherwise with no complaints.    MEDICATIONS  (STANDING):  heparin   Injectable 5000 Unit(s) SubCutaneous every 8 hours  lactated ringers. 1000 milliLiter(s) (100 mL/Hr) IV Continuous <Continuous>  melatonin 3 milliGRAM(s) Oral at bedtime  triamcinolone 0.1% Ointment 1 Application(s) Topical every 12 hours    MEDICATIONS  (PRN):      CAPILLARY BLOOD GLUCOSE        I&O's Summary      PHYSICAL EXAM:  Vital Signs Last 24 Hrs  T(C): 36.6 (28 May 2023 05:03), Max: 36.6 (27 May 2023 22:23)  T(F): 97.9 (28 May 2023 05:03), Max: 97.9 (27 May 2023 22:23)  HR: 75 (28 May 2023 05:03) (69 - 75)  BP: 123/70 (28 May 2023 05:03) (123/70 - 145/71)  BP(mean): --  RR: 17 (28 May 2023 05:03) (17 - 17)  SpO2: 100% (28 May 2023 05:03) (100% - 100%)    Parameters below as of 28 May 2023 05:03  Patient On (Oxygen Delivery Method): room air        GENERAL: NAD, itching diffusely throughout exam  ENT: +sublingual jaundice; moist mucous membranes  HEART: Regular rate and rhythm, no murmurs, rubs, or gallops  LUNGS: Unlabored respirations.  Clear to auscultation bilaterally, no crackles, wheezing, or rhonchi  ABDOMEN: Soft, nontender, nondistended, +BS  NERVOUS SYSTEM:  A&Ox3, no focal deficits   SKIN: Patient itching throughout exam; excoriations noted as patient is itching, diffusely dry skin with some scaling and skin discoloration primarily along neck and upper back, some faint skin colored macular lesions noted which pt reports are chronic, no other rashes or lesions      LABS:                        11.3   5.07  )-----------( 162      ( 27 May 2023 06:09 )             34.8     05-27    141  |  109<H>  |  10  ----------------------------<  102<H>  4.1   |  20<L>  |  1.43<H>    Ca    9.1      27 May 2023 06:09  Phos  2.5     05-27  Mg     1.90     05-27    TPro  6.6  /  Alb  3.8  /  TBili  3.7<H>  /  DBili  3.0<H>  /  AST  257<H>  /  ALT  266<H>  /  AlkPhos  291<H>  05-27              Culture - Blood (collected 26 May 2023 11:40)  Source: .Blood Blood-Peripheral  Preliminary Report (27 May 2023 15:09):    No growth to date.    Culture - Blood (collected 26 May 2023 11:40)  Source: .Blood Blood-Peripheral  Preliminary Report (27 May 2023 15:09):    No growth to date.    Culture - Urine (collected 25 May 2023 22:02)  Source: Clean Catch Clean Catch (Midstream)  Final Report (26 May 2023 22:54):    <10,000 CFU/mL Normal Urogenital Jessica

## 2023-05-28 NOTE — PHYSICAL THERAPY INITIAL EVALUATION ADULT - ADDITIONAL COMMENTS
Pt reports she lives in an apartment no steps, resides with a HHA who is there 5 days a week, has fallen in the past in her home, ambulates with a straight cane.

## 2023-05-28 NOTE — PROGRESS NOTE ADULT - PROBLEM SELECTOR PLAN 2
New transaminitis, reportedly normal on outpatient lab 4/2023  - presenting with new mixed cholestatic and hepatocellular acute injury pattern  - unclear etiology, but given conjunction of symptoms with rash, RACHEL, and eosinophilia must rule out DRESS  - workup thus far has been negative for tylenol tox, less likely from   - given mild-mod degree of transaminitis ddx includes   - mildly improved since admission  - RUQ US remarkable only for hepatic steatosis    Plan:  > follow up infectious workup for viral hepatitis  > continue to monitor  > if worsening, will consider further workup including autoimmune labs Patient presenting with 3 days of diffuse pruritus, without reported rash  - given evidence of systemic injury, including transaminitis and acute renal injury, eosinophilia (increasing, albeit mild) noted on diff, must rule out DIHS/DRESS  - other differential includes cholestasis induced pruritus or other mild adverse drug reaction    Plan:  > symptomatic control with atarax q6h prn  > derm consulted, follow up recs to rule out DRESS  > can consider calamine lotion and topical steroids after eval by derm  > can consider cholestyramine for relief if refractory symptoms given bili may be contributory

## 2023-05-28 NOTE — PHYSICAL THERAPY INITIAL EVALUATION ADULT - PERTINENT HX OF CURRENT PROBLEM, REHAB EVAL
Patient is a 82 year old F with T2DM, hypothyroidism, HTN, HLD, hx of diverticulitis, and recent UTI (completed keflex on 5/22) presents for 3 days of diffuse pruritus.

## 2023-05-28 NOTE — PROGRESS NOTE ADULT - PROBLEM SELECTOR PLAN 6
Outpt labs revealing hypertriglyceridemia and dyslipidemia  - on atorvastatin 40 mg qd for several years, not the etiology of transaminitis    > hold home statin Patient previously on orals, now only diet controlled T2DM  - A1c 4/2023 5.7  - well controlled    Plan:  > low dose ISS

## 2023-05-29 LAB
ALBUMIN SERPL ELPH-MCNC: 3.9 G/DL — SIGNIFICANT CHANGE UP (ref 3.3–5)
ALP SERPL-CCNC: 323 U/L — HIGH (ref 40–120)
ALT FLD-CCNC: 236 U/L — HIGH (ref 4–33)
ANION GAP SERPL CALC-SCNC: 14 MMOL/L — SIGNIFICANT CHANGE UP (ref 7–14)
AST SERPL-CCNC: 195 U/L — HIGH (ref 4–32)
BASOPHILS # BLD AUTO: 0.04 K/UL — SIGNIFICANT CHANGE UP (ref 0–0.2)
BASOPHILS NFR BLD AUTO: 0.7 % — SIGNIFICANT CHANGE UP (ref 0–2)
BILIRUB SERPL-MCNC: 3.7 MG/DL — HIGH (ref 0.2–1.2)
BUN SERPL-MCNC: 9 MG/DL — SIGNIFICANT CHANGE UP (ref 7–23)
CALCIUM SERPL-MCNC: 9.5 MG/DL — SIGNIFICANT CHANGE UP (ref 8.4–10.5)
CHLORIDE SERPL-SCNC: 104 MMOL/L — SIGNIFICANT CHANGE UP (ref 98–107)
CK MB BLD-MCNC: 4 % — HIGH (ref 0–2.5)
CK MB CFR SERPL CALC: 2.1 NG/ML — SIGNIFICANT CHANGE UP
CK SERPL-CCNC: 52 U/L — SIGNIFICANT CHANGE UP (ref 25–170)
CMV DNA CSF QL NAA+PROBE: SIGNIFICANT CHANGE UP IU/ML
CMV DNA SPEC NAA+PROBE-LOG#: SIGNIFICANT CHANGE UP LOG10IU/ML
CO2 SERPL-SCNC: 21 MMOL/L — LOW (ref 22–31)
CREAT SERPL-MCNC: 1.09 MG/DL — SIGNIFICANT CHANGE UP (ref 0.5–1.3)
EGFR: 50 ML/MIN/1.73M2 — LOW
EOSINOPHIL # BLD AUTO: 0.58 K/UL — HIGH (ref 0–0.5)
EOSINOPHIL NFR BLD AUTO: 10 % — HIGH (ref 0–6)
GLUCOSE SERPL-MCNC: 151 MG/DL — HIGH (ref 70–99)
HCT VFR BLD CALC: 36.2 % — SIGNIFICANT CHANGE UP (ref 34.5–45)
HGB BLD-MCNC: 11.9 G/DL — SIGNIFICANT CHANGE UP (ref 11.5–15.5)
IANC: 2.78 K/UL — SIGNIFICANT CHANGE UP (ref 1.8–7.4)
IMM GRANULOCYTES NFR BLD AUTO: 0.3 % — SIGNIFICANT CHANGE UP (ref 0–0.9)
LYMPHOCYTES # BLD AUTO: 2.02 K/UL — SIGNIFICANT CHANGE UP (ref 1–3.3)
LYMPHOCYTES # BLD AUTO: 34.9 % — SIGNIFICANT CHANGE UP (ref 13–44)
MAGNESIUM SERPL-MCNC: 1.7 MG/DL — SIGNIFICANT CHANGE UP (ref 1.6–2.6)
MCHC RBC-ENTMCNC: 28.9 PG — SIGNIFICANT CHANGE UP (ref 27–34)
MCHC RBC-ENTMCNC: 32.9 GM/DL — SIGNIFICANT CHANGE UP (ref 32–36)
MCV RBC AUTO: 87.9 FL — SIGNIFICANT CHANGE UP (ref 80–100)
MONOCYTES # BLD AUTO: 0.35 K/UL — SIGNIFICANT CHANGE UP (ref 0–0.9)
MONOCYTES NFR BLD AUTO: 6 % — SIGNIFICANT CHANGE UP (ref 2–14)
NEUTROPHILS # BLD AUTO: 2.78 K/UL — SIGNIFICANT CHANGE UP (ref 1.8–7.4)
NEUTROPHILS NFR BLD AUTO: 48.1 % — SIGNIFICANT CHANGE UP (ref 43–77)
NRBC # BLD: 0 /100 WBCS — SIGNIFICANT CHANGE UP (ref 0–0)
NRBC # FLD: 0 K/UL — SIGNIFICANT CHANGE UP (ref 0–0)
PHOSPHATE SERPL-MCNC: 3.3 MG/DL — SIGNIFICANT CHANGE UP (ref 2.5–4.5)
PLATELET # BLD AUTO: 175 K/UL — SIGNIFICANT CHANGE UP (ref 150–400)
POTASSIUM SERPL-MCNC: 4.1 MMOL/L — SIGNIFICANT CHANGE UP (ref 3.5–5.3)
POTASSIUM SERPL-SCNC: 4.1 MMOL/L — SIGNIFICANT CHANGE UP (ref 3.5–5.3)
PROT SERPL-MCNC: 6.9 G/DL — SIGNIFICANT CHANGE UP (ref 6–8.3)
RBC # BLD: 4.12 M/UL — SIGNIFICANT CHANGE UP (ref 3.8–5.2)
RBC # FLD: 13.1 % — SIGNIFICANT CHANGE UP (ref 10.3–14.5)
SODIUM SERPL-SCNC: 139 MMOL/L — SIGNIFICANT CHANGE UP (ref 135–145)
TROPONIN T, HIGH SENSITIVITY RESULT: 11 NG/L — SIGNIFICANT CHANGE UP
TSH SERPL-MCNC: 7.74 UIU/ML — HIGH (ref 0.27–4.2)
WBC # BLD: 5.79 K/UL — SIGNIFICANT CHANGE UP (ref 3.8–10.5)
WBC # FLD AUTO: 5.79 K/UL — SIGNIFICANT CHANGE UP (ref 3.8–10.5)

## 2023-05-29 PROCEDURE — 99232 SBSQ HOSP IP/OBS MODERATE 35: CPT | Mod: GC

## 2023-05-29 RX ADMIN — HEPARIN SODIUM 5000 UNIT(S): 5000 INJECTION INTRAVENOUS; SUBCUTANEOUS at 00:42

## 2023-05-29 RX ADMIN — HEPARIN SODIUM 5000 UNIT(S): 5000 INJECTION INTRAVENOUS; SUBCUTANEOUS at 17:35

## 2023-05-29 RX ADMIN — Medication 1 APPLICATION(S): at 17:31

## 2023-05-29 RX ADMIN — Medication 1 APPLICATION(S): at 05:25

## 2023-05-29 RX ADMIN — HEPARIN SODIUM 5000 UNIT(S): 5000 INJECTION INTRAVENOUS; SUBCUTANEOUS at 09:07

## 2023-05-29 RX ADMIN — Medication 3 MILLIGRAM(S): at 21:51

## 2023-05-29 NOTE — PROGRESS NOTE ADULT - PROBLEM SELECTOR PLAN 8
on home amlodipine and losartan  - hold losartan given RACHEL  - BP within acceptable ranges during admission    Plan:  > continue with home amlodipine DVT PPx: heparin subq  Diet: regular  Code: FULL  Dispo: pending improvement    Discharge information:  Pharmacy:   PCP: Dr. Adelita Gilmore

## 2023-05-29 NOTE — PROGRESS NOTE ADULT - PROBLEM SELECTOR PLAN 3
New transaminitis, reportedly normal on outpatient lab 4/2023  - presenting with new mixed cholestatic and hepatocellular acute injury pattern  - unclear etiology, but given conjunction of symptoms with rash, RACHEL, and eosinophilia must rule out DRESS  - workup thus far has been negative for tylenol tox, less likely from   - given mild-mod degree of transaminitis ddx includes   - mildly improved since admission  - RUQ US remarkable only for hepatic steatosis    Plan:  > follow up infectious workup for viral hepatitis  > continue to monitor  > if worsening, will consider further workup including autoimmune labs New transaminitis, reportedly normal on outpatient lab 4/2023  - presenting with new mixed cholestatic and hepatocellular acute injury pattern  - unclear etiology, but given conjunction of symptoms with rash, RAHCEL, and eosinophilia must rule out DRESS  - RUQ US remarkable only for hepatic steatosis  - DDx is broad and includes viral, autoimmune, drug-induced liver injury.    Plan:  > follow up infectious/autoimmune workup for viral hepatitis  > continue to monitor  >  if no etiology is determined we will have to consult hepatology improving

## 2023-05-29 NOTE — PROGRESS NOTE ADULT - PROBLEM SELECTOR PLAN 4
Patient with new RACHEL, baseline Cr 1.0  - patient reports little to no po intake over the past 2 days due to dec appetite  - mildly improved with IVF bolus in the ED  - urinating appropriately, no concerns for obstructive etiology    Plan:  > maintenance IVF  > follow up urine studies  > if worsening, can consider renal US improving Plan:  > continue with home 75 mcg synthroid

## 2023-05-29 NOTE — PROGRESS NOTE ADULT - SUBJECTIVE AND OBJECTIVE BOX
Medicine Progress Note  ---Authored by Darwin Arango MD, PGY3, Internal Medicine    Patient: OPAL KAUR, MRN: [id number], : 1940  Admitted on 23 for Rash and other nonspecific skin eruption      LANGUAGE - Ukrainian - provider discussed with patient in Tong[place]               ------------------------------------------------------------------------------------------------------------  SUMMARY (from last progress note through 23 @ 08:26):   - No interval events  ------------------------------------------------------------------------------------------------------------  SUBJECTIVE / OVERNIGHT EVENTS:  No acute events overnight. Patient seen and evaluated at bedside.  - Patient still with diffuse itching as well as mild right upper quadrant pain.  -  No other new symptoms at this time.  urinating and having bowel movements normally      ROS negative unless noted above.  ------------------------------------------------------------------------------------------------------------  OBJECTIVE:  Physical Exam  CONST:     NAD, well-appearing; well-developed; appears stated age  RESP :        Normal respiratory effort; CTA bilaterally; no W/R/R  CHEST:       No TTP, no lines/ports; symmetric chest expansion  CARDIO:     RRR, normal S1 and S2, no M/R/G   VASC:         No JVD/AJR, no peripheral edema, pulses 2+ B/L, Cap refill <2s  ABD:           Soft, NT/ND, norm bowel sounds; no R/G; No HSM  MSK:          No clubbing of digits; no joint swelling or TTP  PSYCH        A&O to person, place, and time; affect appropriate  NEURO:     Non-focal; no gross sensory deficits; moving all extremities   SKIN:          No rashes; no palpable lesions; axillae not dry    Vital Signs Last 24 Hrs  T(F): 98.2, Max: 98.2 (23 @ 13:50)  HR: 67 (67 - 74)  BP: 130/74 (130/74 - 149/88)  RR: 16 (16 - 18)  SpO2: 96% (96% - 100%)        DAILY MEASUREMENTS:  I&O's Summary    Daily     Daily   Weight (kg): 74.979 (23 @ 13:14)  Orthostatic VS        LABS:                        11.6   5.93  )-----------( 155      ( 28 May 2023 06:25 )             35.4     Hgb Trend: 11.6<--, 11.3<--, 11.7<--, 12.1<--      138  |  106  |  9   ----------------------------<  99  4.4   |  15<L>  |  1.27    Ca    9.2      28 May 2023 06:25  Phos  3.1       Mg     1.90         TPro  6.5  /  Alb  3.7  /  TBili  3.8<H>  /  DBili  x   /  AST  238<H>  /  ALT  253<H>  /  AlkPhos  296<H>        CAPILLARY BLOOD GLUCOSE      POCT Blood Glucose.: 184 mg/dL (28 May 2023 21:31)  POCT Blood Glucose.: 140 mg/dL (28 May 2023 17:36)  POCT Blood Glucose.: 144 mg/dL (28 May 2023 12:46)  POCT Blood Glucose.: 103 mg/dL (28 May 2023 08:48)            Culture - Blood (collected 26 May 2023 11:40)  Source: .Blood Blood-Peripheral  Preliminary Report (27 May 2023 15:09):    No growth to date.    Culture - Blood (collected 26 May 2023 11:40)  Source: .Blood Blood-Peripheral  Preliminary Report (27 May 2023 15:09):    No growth to date.        Venous Blood Gas:  23 @ 19:54  7.37/40/49/23/82.6  VBG Lactate: 1.8      RADIOLOGY & ADDITIONAL TESTS:  Results Reviewed:     Imaging Reviewed:  Electrocardiogram Reviewed:      ------------------------------------------------------------------------------------------------------------  MEDICATIONS  (STANDING):  heparin   Injectable 5000 Unit(s) SubCutaneous every 8 hours  lactated ringers. 1000 milliLiter(s) (100 mL/Hr) IV Continuous <Continuous>  melatonin 3 milliGRAM(s) Oral at bedtime  triamcinolone 0.1% Ointment 1 Application(s) Topical every 12 hours    MEDICATIONS  (PRN):    ------------------------------------------------------------------------------------------------------------  COORDINATION OF CARE:  Care discussed with consultants/other providers and notes reviewed [Y]

## 2023-05-29 NOTE — PROGRESS NOTE ADULT - PROBLEM SELECTOR PLAN 9
DVT PPx: heparin subq  Diet: regular  Code: FULL  Dispo: pending improvement  Communication: discussed with family at bedside 5/26 AM    Discharge information:  Pharmacy:   PCP: Dr. Adelita Gilmore DVT PPx: heparin subq  Diet: regular  Code: FULL  Dispo: pending improvement    Discharge information:  Pharmacy:   PCP: Dr. Adelita Gilmore

## 2023-05-29 NOTE — PROGRESS NOTE ADULT - PROBLEM SELECTOR PLAN 5
Plan:  > continue with home 75 mcg synthroid Patient previously on orals, now only diet controlled T2DM  - A1c 4/2023 5.7  - well controlled    Plan:  > low dose ISS

## 2023-05-29 NOTE — PROGRESS NOTE ADULT - PROBLEM SELECTOR PLAN 6
Patient previously on orals, now only diet controlled T2DM  - A1c 4/2023 5.7  - well controlled    Plan:  > low dose ISS Outpt labs revealing hypertriglyceridemia and dyslipidemia  - on atorvastatin 40 mg qd for several years, not the etiology of transaminitis    > hold home statin

## 2023-05-29 NOTE — PROGRESS NOTE ADULT - PROBLEM SELECTOR PLAN 1
On AM labs 5/28, with on evident cause (RACHEL not so significant, no known ingestion, no BHB elevation, etc)  - f/u VBG to ensure normal pH, no lactate elevation  - f/u urine, serum osm VBG without acidosis Patient presenting with 3 days of diffuse pruritus, without reported rash  - given evidence of systemic injury, including transaminitis and acute renal injury, eosinophilia, must rule out DIHS/DRESS  - other differential includes cholestasis induced pruritus or other mild adverse drug reaction  - on 5/29, eos count rising and now >0.5 indicating peripheral eosinophilia    Plan:  > derm consulted, low suspicion for dress  > CTM skin for evolving rashes  > can consider cholestyramine for relief if refractory symptoms given bili may be contributory

## 2023-05-29 NOTE — PROGRESS NOTE ADULT - PROBLEM SELECTOR PLAN 2
Patient presenting with 3 days of diffuse pruritus, without reported rash  - given evidence of systemic injury, including transaminitis and acute renal injury, eosinophilia (increasing, albeit mild) noted on diff, must rule out DIHS/DRESS  - other differential includes cholestasis induced pruritus or other mild adverse drug reaction    Plan:  > symptomatic control with atarax q6h prn  > derm consulted, follow up recs to rule out DRESS  > can consider calamine lotion and topical steroids after eval by derm  > can consider cholestyramine for relief if refractory symptoms given bili may be contributory Patient presenting with 3 days of diffuse pruritus, without reported rash  - given evidence of systemic injury, including transaminitis and acute renal injury, eosinophilia (increasing, albeit mild) noted on diff, must rule out DIHS/DRESS  - other differential includes cholestasis induced pruritus or other mild adverse drug reaction    Plan:  > derm consulted, low suspicion for dress  > can consider cholestyramine for relief if refractory symptoms given bili may be contributory New transaminitis, reportedly normal on outpatient lab 4/2023  - presenting with new mixed cholestatic and hepatocellular acute injury pattern  - unclear etiology, but given conjunction of symptoms with rash, RACHEL, and eosinophilia must rule out DRESS  - RUQ US remarkable only for hepatic steatosis  - DDx is broad and includes viral, autoimmune, drug-induced liver injury.    Plan:  > follow up infectious/autoimmune workup for viral hepatitis  > continue to monitor  >  if no etiology is determined we will have to consult hepatology

## 2023-05-30 LAB
A1AT SERPL-MCNC: 198 MG/DL — SIGNIFICANT CHANGE UP (ref 90–200)
AFP-TM SERPL-MCNC: 2.8 NG/ML — SIGNIFICANT CHANGE UP
ALBUMIN SERPL ELPH-MCNC: 3.8 G/DL — SIGNIFICANT CHANGE UP (ref 3.3–5)
ALP SERPL-CCNC: 314 U/L — HIGH (ref 40–120)
ALT FLD-CCNC: 221 U/L — HIGH (ref 4–33)
ANION GAP SERPL CALC-SCNC: 13 MMOL/L — SIGNIFICANT CHANGE UP (ref 7–14)
AST SERPL-CCNC: 195 U/L — HIGH (ref 4–32)
BASOPHILS # BLD AUTO: 0.05 K/UL — SIGNIFICANT CHANGE UP (ref 0–0.2)
BASOPHILS NFR BLD AUTO: 0.7 % — SIGNIFICANT CHANGE UP (ref 0–2)
BILE AC FLD-MCNC: 117.3 UMOL/L — HIGH (ref 0–10)
BILIRUB SERPL-MCNC: 3.1 MG/DL — HIGH (ref 0.2–1.2)
BUN SERPL-MCNC: 8 MG/DL — SIGNIFICANT CHANGE UP (ref 7–23)
CALCIUM SERPL-MCNC: 9.6 MG/DL — SIGNIFICANT CHANGE UP (ref 8.4–10.5)
CERULOPLASMIN SERPL-MCNC: 31 MG/DL — SIGNIFICANT CHANGE UP (ref 16–45)
CHLORIDE SERPL-SCNC: 101 MMOL/L — SIGNIFICANT CHANGE UP (ref 98–107)
CO2 SERPL-SCNC: 21 MMOL/L — LOW (ref 22–31)
CREAT SERPL-MCNC: 1.11 MG/DL — SIGNIFICANT CHANGE UP (ref 0.5–1.3)
EGFR: 49 ML/MIN/1.73M2 — LOW
EOSINOPHIL # BLD AUTO: 0.56 K/UL — HIGH (ref 0–0.5)
EOSINOPHIL NFR BLD AUTO: 8.3 % — HIGH (ref 0–6)
GLUCOSE SERPL-MCNC: 103 MG/DL — HIGH (ref 70–99)
HCT VFR BLD CALC: 36 % — SIGNIFICANT CHANGE UP (ref 34.5–45)
HGB BLD-MCNC: 11.8 G/DL — SIGNIFICANT CHANGE UP (ref 11.5–15.5)
IANC: 2.74 K/UL — SIGNIFICANT CHANGE UP (ref 1.8–7.4)
IMM GRANULOCYTES NFR BLD AUTO: 0.3 % — SIGNIFICANT CHANGE UP (ref 0–0.9)
LYMPHOCYTES # BLD AUTO: 2.93 K/UL — SIGNIFICANT CHANGE UP (ref 1–3.3)
LYMPHOCYTES # BLD AUTO: 43.4 % — SIGNIFICANT CHANGE UP (ref 13–44)
MAGNESIUM SERPL-MCNC: 1.7 MG/DL — SIGNIFICANT CHANGE UP (ref 1.6–2.6)
MCHC RBC-ENTMCNC: 29.4 PG — SIGNIFICANT CHANGE UP (ref 27–34)
MCHC RBC-ENTMCNC: 32.8 GM/DL — SIGNIFICANT CHANGE UP (ref 32–36)
MCV RBC AUTO: 89.6 FL — SIGNIFICANT CHANGE UP (ref 80–100)
MITOCHONDRIA AB SER-ACNC: SIGNIFICANT CHANGE UP
MONOCYTES # BLD AUTO: 0.45 K/UL — SIGNIFICANT CHANGE UP (ref 0–0.9)
MONOCYTES NFR BLD AUTO: 6.7 % — SIGNIFICANT CHANGE UP (ref 2–14)
NEUTROPHILS # BLD AUTO: 2.74 K/UL — SIGNIFICANT CHANGE UP (ref 1.8–7.4)
NEUTROPHILS NFR BLD AUTO: 40.6 % — LOW (ref 43–77)
NRBC # BLD: 0 /100 WBCS — SIGNIFICANT CHANGE UP (ref 0–0)
NRBC # FLD: 0 K/UL — SIGNIFICANT CHANGE UP (ref 0–0)
PHOSPHATE SERPL-MCNC: 3.4 MG/DL — SIGNIFICANT CHANGE UP (ref 2.5–4.5)
PLATELET # BLD AUTO: 160 K/UL — SIGNIFICANT CHANGE UP (ref 150–400)
POTASSIUM SERPL-MCNC: 4 MMOL/L — SIGNIFICANT CHANGE UP (ref 3.5–5.3)
POTASSIUM SERPL-SCNC: 4 MMOL/L — SIGNIFICANT CHANGE UP (ref 3.5–5.3)
PROT SERPL-MCNC: 6.9 G/DL — SIGNIFICANT CHANGE UP (ref 6–8.3)
RBC # BLD: 4.02 M/UL — SIGNIFICANT CHANGE UP (ref 3.8–5.2)
RBC # FLD: 13.2 % — SIGNIFICANT CHANGE UP (ref 10.3–14.5)
SMOOTH MUSCLE AB SER-ACNC: SIGNIFICANT CHANGE UP
SODIUM SERPL-SCNC: 135 MMOL/L — SIGNIFICANT CHANGE UP (ref 135–145)
WBC # BLD: 6.75 K/UL — SIGNIFICANT CHANGE UP (ref 3.8–10.5)
WBC # FLD AUTO: 6.75 K/UL — SIGNIFICANT CHANGE UP (ref 3.8–10.5)

## 2023-05-30 PROCEDURE — 99222 1ST HOSP IP/OBS MODERATE 55: CPT | Mod: GC

## 2023-05-30 PROCEDURE — 99233 SBSQ HOSP IP/OBS HIGH 50: CPT

## 2023-05-30 RX ORDER — AMLODIPINE BESYLATE 2.5 MG/1
2.5 TABLET ORAL DAILY
Refills: 0 | Status: DISCONTINUED | OUTPATIENT
Start: 2023-05-30 | End: 2023-06-01

## 2023-05-30 RX ORDER — PANTOPRAZOLE SODIUM 20 MG/1
40 TABLET, DELAYED RELEASE ORAL
Refills: 0 | Status: DISCONTINUED | OUTPATIENT
Start: 2023-05-30 | End: 2023-06-01

## 2023-05-30 RX ORDER — TIMOLOL 0.5 %
1 DROPS OPHTHALMIC (EYE) DAILY
Refills: 0 | Status: DISCONTINUED | OUTPATIENT
Start: 2023-05-30 | End: 2023-06-01

## 2023-05-30 RX ORDER — LEVOTHYROXINE SODIUM 125 MCG
75 TABLET ORAL DAILY
Refills: 0 | Status: DISCONTINUED | OUTPATIENT
Start: 2023-05-30 | End: 2023-06-01

## 2023-05-30 RX ORDER — URSODIOL 250 MG/1
500 TABLET, FILM COATED ORAL EVERY 12 HOURS
Refills: 0 | Status: DISCONTINUED | OUTPATIENT
Start: 2023-05-30 | End: 2023-06-01

## 2023-05-30 RX ORDER — GABAPENTIN 400 MG/1
100 CAPSULE ORAL DAILY
Refills: 0 | Status: DISCONTINUED | OUTPATIENT
Start: 2023-05-30 | End: 2023-06-01

## 2023-05-30 RX ADMIN — PANTOPRAZOLE SODIUM 40 MILLIGRAM(S): 20 TABLET, DELAYED RELEASE ORAL at 17:30

## 2023-05-30 RX ADMIN — HEPARIN SODIUM 5000 UNIT(S): 5000 INJECTION INTRAVENOUS; SUBCUTANEOUS at 01:36

## 2023-05-30 RX ADMIN — Medication 1 APPLICATION(S): at 05:25

## 2023-05-30 RX ADMIN — Medication 75 MICROGRAM(S): at 17:29

## 2023-05-30 RX ADMIN — URSODIOL 500 MILLIGRAM(S): 250 TABLET, FILM COATED ORAL at 18:03

## 2023-05-30 RX ADMIN — GABAPENTIN 100 MILLIGRAM(S): 400 CAPSULE ORAL at 17:29

## 2023-05-30 RX ADMIN — AMLODIPINE BESYLATE 2.5 MILLIGRAM(S): 2.5 TABLET ORAL at 17:30

## 2023-05-30 RX ADMIN — HEPARIN SODIUM 5000 UNIT(S): 5000 INJECTION INTRAVENOUS; SUBCUTANEOUS at 08:42

## 2023-05-30 RX ADMIN — HEPARIN SODIUM 5000 UNIT(S): 5000 INJECTION INTRAVENOUS; SUBCUTANEOUS at 17:30

## 2023-05-30 RX ADMIN — Medication 1 DROP(S): at 17:29

## 2023-05-30 RX ADMIN — Medication 3 MILLIGRAM(S): at 22:08

## 2023-05-30 RX ADMIN — Medication 1 APPLICATION(S): at 17:30

## 2023-05-30 NOTE — PROGRESS NOTE ADULT - PROBLEM SELECTOR PLAN 2
New transaminitis, reportedly normal on outpatient lab 4/2023  - presenting with new mixed cholestatic and hepatocellular acute injury pattern  - unclear etiology, but given conjunction of symptoms with rash, RACHEL, and eosinophilia must rule out DRESS  - RUQ US remarkable only for hepatic steatosis  - DDx is broad and includes viral, autoimmune, drug-induced liver injury.    Plan:  > follow up infectious/autoimmune workup for viral hepatitis  > continue to monitor  >  if no etiology is determined we will have to consult hepatology New transaminitis, reportedly normal on outpatient lab 4/2023  - presenting with new mixed cholestatic and hepatocellular acute injury pattern  - unclear etiology, but given conjunction of symptoms with pruritus, and eosinophilia must rule out DIHS  - RUQ US remarkable only for hepatic steatosis  - DDx is broad and includes viral, autoimmune, drug-induced liver injury. DIHS, PBC  - continuing to downtrend but remains elevated    Plan:  > follow up remaining infectious/autoimmune workup, thus far unremarkable  > hepatology consulted, follow up recs  > may consider further eval with MRCP or further tx with steroids +/- symptomatic relief with bile acid sequestrants as above

## 2023-05-30 NOTE — PROGRESS NOTE ADULT - TIME BILLING
reviewing laboratory data, consultants' recommendations, documentation in [place], performing medically appropriate examinations/evaluations, discussion with patient/family/RN/ACP/Residents and interdisciplinary staff (such as , social workers, etc), counseling patient/family/care giver, ordering medically appropriate medication, tests, or procedures

## 2023-05-30 NOTE — PROGRESS NOTE ADULT - PROBLEM SELECTOR PLAN 8
DVT PPx: heparin subq  Diet: regular  Code: FULL  Dispo: pending improvement    Discharge information:  Pharmacy:   PCP: Dr. Adelita Gilmore

## 2023-05-30 NOTE — PROGRESS NOTE ADULT - SUBJECTIVE AND OBJECTIVE BOX
PROGRESS NOTE:     Patient is a 82y old  Female who presents with a chief complaint of Transaminitis, RACHEL, Pruritus (29 May 2023 06:08)      ---------------------------------------------------  Stewart Early  PGY-1, Internal Medicine  Available on [employer] Teams  Pager: [phone] ([facility]), or [phone] ([facility])  ---------------------------------------------------    INTERVAL / OVERNIGHT EVENTS:  No acute events overnight.     SUBJECTIVE:  Patient examined at bedside with no acute complaints.       BRIEF DAILY PLAN:    Pain:  Bowel Movements:  Urination:  OOB:  PT:    REVIEW OF SYSTEMS:    CONSTITUTIONAL: No weakness, fevers or chills  EYES/ENT: No visual changes;  No vertigo or throat pain   NECK: No pain or stiffness  RESPIRATORY: No cough, wheezing, hemoptysis; No shortness of breath  CARDIOVASCULAR: No chest pain or palpitations  GASTROINTESTINAL: No abdominal or epigastric pain. No nausea, vomiting, or hematemesis; No diarrhea or constipation. No melena or hematochezia.  GENITOURINARY: No dysuria, frequency or hematuria  NEUROLOGICAL: No numbness or weakness  SKIN: No itching, rashes      MEDICATIONS  (STANDING):  heparin   Injectable 5000 Unit(s) SubCutaneous every 8 hours  lactated ringers. 1000 milliLiter(s) (100 mL/Hr) IV Continuous <Continuous>  melatonin 3 milliGRAM(s) Oral at bedtime  triamcinolone 0.1% Ointment 1 Application(s) Topical every 12 hours    MEDICATIONS  (PRN):      CAPILLARY BLOOD GLUCOSE        I&O's Summary      VITALS:   T(C): 36.4 (05-30-23 @ 04:59), Max: 36.7 (05-29-23 @ 22:11)  HR: 71 (05-30-23 @ 04:59) (64 - 75)  BP: 114/63 (05-30-23 @ 04:59) (114/63 - 150/69)  RR: 16 (05-30-23 @ 04:59) (16 - 17)  SpO2: 96% (05-30-23 @ 04:59) (94% - 100%)    GENERAL: NAD, lying in bed comfortably  HEAD:  Atraumatic, normocephalic  EYES: EOMI, PERRLA, conjunctiva and sclera clear  ENT: Moist mucous membranes  NECK: Supple, no JVD  HEART: Regular rate and rhythm, no murmurs, rubs, or gallops  LUNGS: Unlabored respirations.  Clear to auscultation bilaterally, no crackles, wheezing, or rhonchi  ABDOMEN: Soft, nontender, nondistended, +BS  EXTREMITIES: 2+ peripheral pulses bilaterally. No clubbing, cyanosis, or edema  NERVOUS SYSTEM:  A&Ox3, no focal deficits   SKIN: No rashes or lesions    LABS:                        11.9   5.79  )-----------( 175      ( 29 May 2023 10:29 )             36.2     05-29    139  |  104  |  9   ----------------------------<  151<H>  4.1   |  21<L>  |  1.09    Ca    9.5      29 May 2023 10:29  Phos  3.3     05-29  Mg     1.70     05-29    TPro  6.9  /  Alb  3.9  /  TBili  3.7<H>  /  DBili  x   /  AST  195<H>  /  ALT  236<H>  /  AlkPhos  323<H>  05-29      CARDIAC MARKERS ( 29 May 2023 10:29 )  x     / x     / 52 U/L / x     / 2.1 ng/mL            RADIOLOGY & ADDITIONAL TESTS:  Results Reviewed:   Imaging Personally Reviewed:  Electrocardiogram Personally Reviewed:    COORDINATION OF CARE:  Care Discussed with Consultants/Other Providers [Y/N]:  Prior or Outpatient Records Reviewed [Y/N]:     PROGRESS NOTE:     Patient is a 82y old  Female who presents with a chief complaint of Transaminitis, RACHEL, Pruritus (29 May 2023 06:08)      ---------------------------------------------------  Stewart Early  PGY-1, Internal Medicine  Available on Microsoft Teams  Pager: [phone] ([facility]), or [phone] ([facility])  ---------------------------------------------------    INTERVAL / OVERNIGHT EVENTS:  - No acute events overnight.     SUBJECTIVE:  - Patient examined at bedside, reports significant improvement in pruritus  - Continues to endorse bilateral breast pain    BRIEF DAILY PLAN:  - Follow up remaining labs  - Hepatology consulted, follow up recs  - Consider further tx bile acid sequestrants, further workup potentially with MRCP    MEDICATIONS  (STANDING):  heparin   Injectable 5000 Unit(s) SubCutaneous every 8 hours  lactated ringers. 1000 milliLiter(s) (100 mL/Hr) IV Continuous <Continuous>  melatonin 3 milliGRAM(s) Oral at bedtime  triamcinolone 0.1% Ointment 1 Application(s) Topical every 12 hours    MEDICATIONS  (PRN):      CAPILLARY BLOOD GLUCOSE      I&O's Summary    VITALS:   T(C): 36.4 (05-30-23 @ 04:59), Max: 36.7 (05-29-23 @ 22:11)  HR: 71 (05-30-23 @ 04:59) (64 - 75)  BP: 114/63 (05-30-23 @ 04:59) (114/63 - 150/69)  RR: 16 (05-30-23 @ 04:59) (16 - 17)  SpO2: 96% (05-30-23 @ 04:59) (94% - 100%)    GENERAL: NAD, lying in bed comfortably  HEART: Regular rate and rhythm, no murmurs, rubs, or gallops  LUNGS: Unlabored respirations.  Clear to auscultation bilaterally, no crackles, wheezing, or rhonchi  ABDOMEN: +RUQ TTP, neg Boswell's  EXTREMITIES: 2+ peripheral pulses bilaterally. No clubbing, cyanosis, or edema  NERVOUS SYSTEM:  A&Ox3, no focal deficits   SKIN: No rashes or lesions      LABS:  05-29 @ 10:29 Creatine 52 U/L [25 - 170]                          11.8   6.75  )-----------( 160      ( 30 May 2023 06:40 )             36.0     05-30    135  |  101  |  8   ----------------------------<  103<H>  4.0   |  21<L>  |  1.11    Ca    9.6      30 May 2023 06:40  Phos  3.4     05-30  Mg     1.70     05-30    TPro  6.9  /  Alb  3.8  /  TBili  3.1<H>  /  DBili  x   /  AST  195<H>  /  ALT  221<H>  /  AlkPhos  314<H>  05-30

## 2023-05-30 NOTE — CONSULT NOTE ADULT - SUBJECTIVE AND OBJECTIVE BOX
HPI:    Allergies:  Keflex (Other)      Home Medications:    Hospital Medications:  heparin   Injectable 5000 Unit(s) SubCutaneous every 8 hours  melatonin 3 milliGRAM(s) Oral at bedtime  triamcinolone 0.1% Ointment 1 Application(s) Topical every 12 hours      PMHX/PSHX:  Diabetes mellitus    Dyslipidemia    Hypothyroidism    Neuropathy    Bilateral dry eyes    History of ectopic pregnancy    Hypertension    History of hysterectomy    S/P cholecystectomy    History of appendectomy        Family history:  Family history of total abdominal hysterectomy and bilateral salpingo-oophorectomy    Family history of stroke    Family history of diabetes mellitus (DM) (Sibling)        Denies family history of colon cancer/polyps, stomach cancer/polyps, pancreatic cancer/masses, liver cancer/disease, ovarian cancer and endometrial cancer.    Social History:   Tob: Denies  EtOH: Denies  Illicit Drugs: Denies    ROS:     General:  No wt loss, fevers, chills, night sweats, fatigue  Eyes:  Good vision, no reported pain  ENT:  No sore throat, pain, runny nose, dysphagia  CV:  No pain, palpitations, hypo/hypertension  Pulm:  No dyspnea, cough, tachypnea, wheezing  GI:  see HPI  :  No pain, bleeding, incontinence, nocturia  Muscle:  No pain, weakness  Neuro:  No weakness, tingling, memory problems  Psych:  No fatigue, insomnia, mood problems, depression  Endocrine:  No polyuria, polydipsia, cold/heat intolerance  Heme:  No petechiae, ecchymosis, easy bruisability  Skin:  No rash, tattoos, scars, edema    PHYSICAL EXAM:     GENERAL:  No acute distress  HEENT:  NCAT, no scleral icterus   CHEST:  no respiratory distress  HEART:  Regular rate and rhythm  ABDOMEN:  Soft, non-tender, non-distended, normoactive bowel sounds,  no masses  EXTREMITIES: No edema  SKIN:  No rash/erythema/ecchymoses/petechiae/wounds/abscess/warm/dry  NEURO:  Alert and oriented x 3, no asterixis    Vital Signs:  Vital Signs Last 24 Hrs  T(C): 36.4 (30 May 2023 04:59), Max: 36.7 (29 May 2023 22:11)  T(F): 97.5 (30 May 2023 04:59), Max: 98 (29 May 2023 22:11)  HR: 71 (30 May 2023 04:59) (64 - 75)  BP: 114/63 (30 May 2023 04:59) (114/63 - 150/69)  BP(mean): --  RR: 16 (30 May 2023 04:59) (16 - 17)  SpO2: 96% (30 May 2023 04:59) (94% - 100%)    Parameters below as of 30 May 2023 04:59  Patient On (Oxygen Delivery Method): room air      Daily     Daily     LABS:                        11.8   6.75  )-----------( 160      ( 30 May 2023 06:40 )             36.0     Mean Cell Volume: 89.6 fL (05-30-23 @ 06:40)    05-30    135  |  101  |  8   ----------------------------<  103<H>  4.0   |  21<L>  |  1.11    Ca    9.6      30 May 2023 06:40  Phos  3.4     05-30  Mg     1.70     05-30    TPro  6.9  /  Alb  3.8  /  TBili  3.1<H>  /  DBili  x   /  AST  195<H>  /  ALT  221<H>  /  AlkPhos  314<H>  05-30    LIVER FUNCTIONS - ( 30 May 2023 06:40 )  Alb: 3.8 g/dL / Pro: 6.9 g/dL / ALK PHOS: 314 U/L / ALT: 221 U/L / AST: 195 U/L / GGT: x                                       11.8   6.75  )-----------( 160      ( 30 May 2023 06:40 )             36.0                         11.9   5.79  )-----------( 175      ( 29 May 2023 10:29 )             36.2                         11.6   5.93  )-----------( 155      ( 28 May 2023 06:25 )             35.4       Imaging:             HPI:    Ms. Banks is a 82 yrs old F with T2DM, hypothyroidism, HTN, HLD, hx of diverticulitis, and recent UTI (completed keflex on 5/22) who presented w/ pruritis that started on 5/24. Pt. reported she came to see her friend in [facility] because she was dx w/ bone cancer. Reported after she went home, she developed severe itching, which persisted and worsened the last 7 days. Denied abd pain, nausea, vomiting, fevers, chills, and diarrhea. Never had these symptoms before. Reported that she had liver issues when she was young in childhood, which was attributed to medications but she never followed up in clinic after that. Reported she was started on atorvastatin about 4-5 months ago, also took Keflex on 5/22 for 7 days for UTI and was recently started on collagen tablets about 1 month ago. On admission, VSS, labs showed elevated AST/ALT w/ elevated bili, imaging showed mild steatosis but no intrahepatic or extrahepatic dilation. Hepatology consulted for elevated liver enzymes.     Allergies:  Keflex (Other)    Home Medications:    · 	levothyroxine 75 mcg (0.075 mg) oral tablet: Last Dose Taken:  , 1 orally once a day  · 	atorvastatin 40 mg oral tablet: Last Dose Taken:  , 1 tab(s) orally once a day  · 	Tylenol 500 mg oral tablet: Last Dose Taken:  , 2 tab(s) orally every 8 hours as needed for  moderate pain  · 	B-12 1000 mcg oral tablet: Last Dose Taken:  , 1 orally once a day  · 	amLODIPine 2.5 mg oral tablet: Last Dose Taken:  , 1 tab(s) orally once a day  · 	gabapentin 100 mg oral tablet: Last Dose Taken:  , 1 tab(s) orally once a day  · 	losartan 100 mg oral tablet: Last Dose Taken:  , 1 orally once a day  · 	timolol hemihydrate 0.5% ophthalmic solution: Last Dose Taken:  , 1 application in each affected eye once a day  · 	pantoprazole 40 mg oral delayed release tablet: Last Dose Taken:  , 1 orally once a day  · 	diclofenac 1% topical gel: Last Dose Taken:  , Apply topically to affected area  · 	Linzess 72 mcg oral capsule: Last Dose Taken:  , 1 orally once a day    Hospital Medications:  heparin   Injectable 5000 Unit(s) SubCutaneous every 8 hours  melatonin 3 milliGRAM(s) Oral at bedtime  triamcinolone 0.1% Ointment 1 Application(s) Topical every 12 hours    PMHX/PSHX:  Diabetes mellitus    Dyslipidemia    Hypothyroidism    Neuropathy    Bilateral dry eyes    History of ectopic pregnancy    Hypertension    History of hysterectomy    S/P cholecystectomy    History of appendectomy    Family history:  Family history of total abdominal hysterectomy and bilateral salpingo-oophorectomy    Family history of stroke    Family history of diabetes mellitus (DM) (Sibling)    No liver cirrhosis or HCC in family.     Social History:   Tob: Denies  EtOH: Denies  Illicit Drugs: Denies    ROS:     General:  No wt loss, fevers, chills, night sweats, fatigue  Eyes:  Good vision, no reported pain  ENT:  No sore throat, pain, runny nose, dysphagia  CV:  No pain, palpitations, hypo/hypertension  Pulm:  No dyspnea, cough, tachypnea, wheezing  GI:  see HPI  :  No pain, bleeding, incontinence, nocturia  Muscle:  No pain, weakness  Neuro:  No weakness, tingling, memory problems  Psych:  No fatigue, insomnia, mood problems, depression  Endocrine:  No polyuria, polydipsia, cold/heat intolerance  Heme:  No petechiae, ecchymosis, easy bruisability  Skin:  No rash, tattoos, scars, edema    PHYSICAL EXAM:     GENERAL:  No acute distress, lying in bed, overweight female.   HEENT:  NCAT, Mild scleral icterus   CHEST:  no respiratory distress  HEART:  Regular rate and rhythm  ABDOMEN:  Soft, well healed surgical scars, non-tender, non-distended, no masses  EXTREMITIES: No LE edema  SKIN:  No rash/erythema/ecchymoses/petechiae/wounds/abscess/warm/dry  NEURO:  Alert and oriented x 3, no asterixis, no tremors.     Vital Signs:  Vital Signs Last 24 Hrs  T(C): 36.4 (30 May 2023 04:59), Max: 36.7 (29 May 2023 22:11)  T(F): 97.5 (30 May 2023 04:59), Max: 98 (29 May 2023 22:11)  HR: 71 (30 May 2023 04:59) (64 - 75)  BP: 114/63 (30 May 2023 04:59) (114/63 - 150/69)  BP(mean): --  RR: 16 (30 May 2023 04:59) (16 - 17)  SpO2: 96% (30 May 2023 04:59) (94% - 100%)    Parameters below as of 30 May 2023 04:59  Patient On (Oxygen Delivery Method): room air      Daily     Daily     LABS:                        11.8   6.75  )-----------( 160      ( 30 May 2023 06:40 )             36.0     Mean Cell Volume: 89.6 fL (05-30-23 @ 06:40)    05-30    135  |  101  |  8   ----------------------------<  103<H>  4.0   |  21<L>  |  1.11    Ca    9.6      30 May 2023 06:40  Phos  3.4     05-30  Mg     1.70     05-30    TPro  6.9  /  Alb  3.8  /  TBili  3.1<H>  /  DBili  x   /  AST  195<H>  /  ALT  221<H>  /  AlkPhos  314<H>  05-30    LIVER FUNCTIONS - ( 30 May 2023 06:40 )  Alb: 3.8 g/dL / Pro: 6.9 g/dL / ALK PHOS: 314 U/L / ALT: 221 U/L / AST: 195 U/L / GGT: x                                       11.8   6.75  )-----------( 160      ( 30 May 2023 06:40 )             36.0                         11.9   5.79  )-----------( 175      ( 29 May 2023 10:29 )             36.2                         11.6   5.93  )-----------( 155      ( 28 May 2023 06:25 )             35.4       Imaging:    CT A/P non con  LOWER CHEST: Aortic valvular, coronary artery, and mitral annular   calcification.    LIVER: Punctate right liver calcification.  BILE DUCTS: Normal caliber.  GALLBLADDER: Cholecystectomy.  SPLEEN: Within normal limits.  PANCREAS: Within normal limits.  ADRENALS: Within normal limits.  KIDNEYS/URETERS: Within normal limits.    BLADDER: Within normal limits.  REPRODUCTIVE ORGANS: Hysterectomy.    BOWEL: No bowel obstruction. Extensive sigmoid diverticulosis without   evidence of diverticulitis. Appendix is not visualized. No evidence of   inflammation in the pericecal region.  PERITONEUM: No ascites.  VESSELS: Atherosclerotic changes.  RETROPERITONEUM/LYMPH NODES: No lymphadenopathy.  ABDOMINAL WALL: Bilateral fat-containing inguinal hernias. Small   fat-containing umbilical hernia. Injection granulomas within the   subcutaneous fat of the buttocks.  BONES: Degenerative changes. Not acute appearing moderate T8 vertebral   body compression deformity.    IMPRESSION:  No acute intra-abdominal pathology on noncontrast CT.      US abd    FINDINGS:  Liver: Mildly increased in echogenicity.  Bile ducts: Normal caliber. Common bile duct measures 7 mm.  Gallbladder: Cholecystectomy.  Pancreas: Visualized portions are within normal limits.  Right kidney: 9.1 cm. No hydronephrosis.  Ascites: None.  IVC: Visualized portions are within normal limits.    IMPRESSION:  Status post cholecystectomy with no abnormal biliary ductal dilatation.  Very mild hepatic steatosis.           HPI:    Ms. Banks is a 82 yrs old F with T2DM, hypothyroidism, HTN, HLD, hx of diverticulitis, and recent UTI (completed keflex on 5/22) who presented w/ pruritis that started on 5/24.  was used, ID [id number]. Pt. reported she came to see her friend in [facility] because she was dx w/ bone cancer. Reported after she went home, she developed severe itching, which persisted and worsened the last 7 days. Denied abd pain, nausea, vomiting, fevers, chills, and diarrhea. Never had these symptoms before. Reported that she had liver issues when she was young in childhood, which was attributed to medications but she never followed up in clinic after that. Reported she was started on atorvastatin about 4-5 months ago, also took Keflex on 5/22 for 7 days for UTI and was recently started on collagen tablets about 1 month ago. On admission, VSS, labs showed elevated AST/ALT w/ elevated bili, imaging showed mild steatosis but no intrahepatic or extrahepatic dilation. Hepatology consulted for elevated liver enzymes.     Allergies:  Keflex (Other)    Home Medications:    · 	levothyroxine 75 mcg (0.075 mg) oral tablet: Last Dose Taken:  , 1 orally once a day  · 	atorvastatin 40 mg oral tablet: Last Dose Taken:  , 1 tab(s) orally once a day  · 	Tylenol 500 mg oral tablet: Last Dose Taken:  , 2 tab(s) orally every 8 hours as needed for  moderate pain  · 	B-12 1000 mcg oral tablet: Last Dose Taken:  , 1 orally once a day  · 	amLODIPine 2.5 mg oral tablet: Last Dose Taken:  , 1 tab(s) orally once a day  · 	gabapentin 100 mg oral tablet: Last Dose Taken:  , 1 tab(s) orally once a day  · 	losartan 100 mg oral tablet: Last Dose Taken:  , 1 orally once a day  · 	timolol hemihydrate 0.5% ophthalmic solution: Last Dose Taken:  , 1 application in each affected eye once a day  · 	pantoprazole 40 mg oral delayed release tablet: Last Dose Taken:  , 1 orally once a day  · 	diclofenac 1% topical gel: Last Dose Taken:  , Apply topically to affected area  · 	Linzess 72 mcg oral capsule: Last Dose Taken:  , 1 orally once a day    Hospital Medications:  heparin   Injectable 5000 Unit(s) SubCutaneous every 8 hours  melatonin 3 milliGRAM(s) Oral at bedtime  triamcinolone 0.1% Ointment 1 Application(s) Topical every 12 hours    PMHX/PSHX:  Diabetes mellitus    Dyslipidemia    Hypothyroidism    Neuropathy    Bilateral dry eyes    History of ectopic pregnancy    Hypertension    History of hysterectomy    S/P cholecystectomy    History of appendectomy    Family history:  Family history of total abdominal hysterectomy and bilateral salpingo-oophorectomy    Family history of stroke    Family history of diabetes mellitus (DM) (Sibling)    No liver cirrhosis or HCC in family.     Social History:   Tob: Denies  EtOH: Denies  Illicit Drugs: Denies    ROS:     General:  No wt loss, fevers, chills, night sweats, fatigue  Eyes:  Good vision, no reported pain  ENT:  No sore throat, pain, runny nose, dysphagia  CV:  No pain, palpitations, hypo/hypertension  Pulm:  No dyspnea, cough, tachypnea, wheezing  GI:  see HPI  :  No pain, bleeding, incontinence, nocturia  Muscle:  No pain, weakness  Neuro:  No weakness, tingling, memory problems  Psych:  No fatigue, insomnia, mood problems, depression  Endocrine:  No polyuria, polydipsia, cold/heat intolerance  Heme:  No petechiae, ecchymosis, easy bruisability  Skin:  No rash, tattoos, scars, edema    PHYSICAL EXAM:     GENERAL:  No acute distress, lying in bed, overweight female.   HEENT:  NCAT, Mild scleral icterus   CHEST:  no respiratory distress  HEART:  Regular rate and rhythm  ABDOMEN:  Soft, well healed surgical scars, non-tender, non-distended, no masses  EXTREMITIES: No LE edema  SKIN:  No rash/erythema/ecchymoses/petechiae/wounds/abscess/warm/dry  NEURO:  Alert and oriented x 3, no asterixis, no tremors.     Vital Signs:  Vital Signs Last 24 Hrs  T(C): 36.4 (30 May 2023 04:59), Max: 36.7 (29 May 2023 22:11)  T(F): 97.5 (30 May 2023 04:59), Max: 98 (29 May 2023 22:11)  HR: 71 (30 May 2023 04:59) (64 - 75)  BP: 114/63 (30 May 2023 04:59) (114/63 - 150/69)  BP(mean): --  RR: 16 (30 May 2023 04:59) (16 - 17)  SpO2: 96% (30 May 2023 04:59) (94% - 100%)    Parameters below as of 30 May 2023 04:59  Patient On (Oxygen Delivery Method): room air      Daily     Daily     LABS:                        11.8   6.75  )-----------( 160      ( 30 May 2023 06:40 )             36.0     Mean Cell Volume: 89.6 fL (05-30-23 @ 06:40)    05-30    135  |  101  |  8   ----------------------------<  103<H>  4.0   |  21<L>  |  1.11    Ca    9.6      30 May 2023 06:40  Phos  3.4     05-30  Mg     1.70     05-30    TPro  6.9  /  Alb  3.8  /  TBili  3.1<H>  /  DBili  x   /  AST  195<H>  /  ALT  221<H>  /  AlkPhos  314<H>  05-30    LIVER FUNCTIONS - ( 30 May 2023 06:40 )  Alb: 3.8 g/dL / Pro: 6.9 g/dL / ALK PHOS: 314 U/L / ALT: 221 U/L / AST: 195 U/L / GGT: x                                       11.8   6.75  )-----------( 160      ( 30 May 2023 06:40 )             36.0                         11.9   5.79  )-----------( 175      ( 29 May 2023 10:29 )             36.2                         11.6   5.93  )-----------( 155      ( 28 May 2023 06:25 )             35.4       Imaging:    CT A/P non con  LOWER CHEST: Aortic valvular, coronary artery, and mitral annular   calcification.    LIVER: Punctate right liver calcification.  BILE DUCTS: Normal caliber.  GALLBLADDER: Cholecystectomy.  SPLEEN: Within normal limits.  PANCREAS: Within normal limits.  ADRENALS: Within normal limits.  KIDNEYS/URETERS: Within normal limits.    BLADDER: Within normal limits.  REPRODUCTIVE ORGANS: Hysterectomy.    BOWEL: No bowel obstruction. Extensive sigmoid diverticulosis without   evidence of diverticulitis. Appendix is not visualized. No evidence of   inflammation in the pericecal region.  PERITONEUM: No ascites.  VESSELS: Atherosclerotic changes.  RETROPERITONEUM/LYMPH NODES: No lymphadenopathy.  ABDOMINAL WALL: Bilateral fat-containing inguinal hernias. Small   fat-containing umbilical hernia. Injection granulomas within the   subcutaneous fat of the buttocks.  BONES: Degenerative changes. Not acute appearing moderate T8 vertebral   body compression deformity.    IMPRESSION:  No acute intra-abdominal pathology on noncontrast CT.      US abd    FINDINGS:  Liver: Mildly increased in echogenicity.  Bile ducts: Normal caliber. Common bile duct measures 7 mm.  Gallbladder: Cholecystectomy.  Pancreas: Visualized portions are within normal limits.  Right kidney: 9.1 cm. No hydronephrosis.  Ascites: None.  IVC: Visualized portions are within normal limits.    IMPRESSION:  Status post cholecystectomy with no abnormal biliary ductal dilatation.  Very mild hepatic steatosis.

## 2023-05-30 NOTE — CONSULT NOTE ADULT - ATTENDING COMMENTS
82F with pruritis and elevated liver tests of unclear etiology - cholestatic in nature  DDX of drug vs PBC vs PSC  Agree with AMA FABIANO Anti smooth muscle  Agree with MRI MRCP to evaluate for PSC    Can initiate ursodiol ~13mg/kg in two divided doses  Possibility of liver biopsy discussed with patient as well

## 2023-05-30 NOTE — PROGRESS NOTE ADULT - PROBLEM SELECTOR PLAN 1
Patient presenting with 3 days of diffuse pruritus, without reported rash  - given evidence of systemic injury, including transaminitis and acute renal injury, eosinophilia, must rule out DIHS/DRESS  - other differential includes cholestasis induced pruritus or other mild adverse drug reaction  - on 5/29, eos count rising and now >0.5 indicating peripheral eosinophilia    Plan:  > derm consulted, low suspicion for dress  > CTM skin for evolving rashes  > can consider cholestyramine for relief if refractory symptoms given bili may be contributory Patient presenting with 3 days of diffuse pruritus, without reported rash  - given evidence of systemic injury, including transaminitis and acute renal injury, eosinophilia, must rule out DIHS  - other differential includes cholestasis induced pruritus or other mild adverse drug reaction  - on 5/29, eos count rising and now >0.5 indicating peripheral eosinophilia  - remains with high suspicion for DIHS 2/2 to cephalosporin completed outpatient; no other obvious etiology of transaminitis    Plan:  > CTM skin for evolving rashes  > can consider ursodiol cholestyramine for symptomatic relief

## 2023-05-30 NOTE — PROGRESS NOTE ADULT - ASSESSMENT
82 year old F with T2DM, hypothyroidism, HTN, HLD, recent UTI s/p course of keflex 5 days prior, presents for 3 days of diffuse pruritus without obvious rash. Patient incidentally found to have transaminitis consistent with mixed hepatocellular/cholestatic pattern of injury, along with acute renal injury. 82 year old F with T2DM, hypothyroidism, HTN, HLD, recent UTI s/p course of keflex 5 days prior, presents for 3 days of diffuse pruritus without obvious rash. Patient incidentally found to have transaminitis consistent with mixed hepatocellular/cholestatic pattern of injury, along with acute renal injury. Patient now with persistent transaminitis, potentially due to DIHS vs intrahepatic pathology.

## 2023-05-30 NOTE — PROGRESS NOTE ADULT - SUBJECTIVE AND OBJECTIVE BOX
INTERVAL HPI/OVERNIGHT EVENTS:  S: Patient is a 82y old  Female who presents with a chief complaint of Transaminitis, RACHEL, Pruritus (30 May 2023 08:58)    No acute overnight events. Skin symptoms stable.    MEDICATIONS  (STANDING):  heparin   Injectable 5000 Unit(s) SubCutaneous every 8 hours  melatonin 3 milliGRAM(s) Oral at bedtime  triamcinolone 0.1% Ointment 1 Application(s) Topical every 12 hours    MEDICATIONS  (PRN):      Allergies    Keflex (Other)    Intolerances        REVIEW OF SYSTEMS      General: no fevers/chills, no NS	    Skin: see HPI  	  Ophthalmologic: no eye pain or change in vision    Genitourinary: no dysuria or hematuria    Musculoskeletal: no joint pains or weakness	    Neurological:no weakness or tingling          Vital Signs Last 24 Hrs  T(C): 36.4 (30 May 2023 04:59), Max: 36.7 (29 May 2023 22:11)  T(F): 97.5 (30 May 2023 04:59), Max: 98 (29 May 2023 22:11)  HR: 71 (30 May 2023 04:59) (64 - 71)  BP: 114/63 (30 May 2023 04:59) (114/63 - 143/68)  BP(mean): --  RR: 16 (30 May 2023 04:59) (16 - 17)  SpO2: 96% (30 May 2023 04:59) (94% - 96%)    Parameters below as of 30 May 2023 04:59  Patient On (Oxygen Delivery Method): room air        PHYSICAL EXAM:  The patient was alert and oriented X 3 and in no apparent distress.  There was no visible lymphadenopathy.  Conjunctiva were non injected  There was no clubbing or edema of extremities.    Of note on skin exam:      LABS:                        11.8   6.75  )-----------( 160      ( 30 May 2023 06:40 )             36.0     05-30    135  |  101  |  8   ----------------------------<  103<H>  4.0   |  21<L>  |  1.11    Ca    9.6      30 May 2023 06:40  Phos  3.4     05-30  Mg     1.70     05-30    TPro  6.9  /  Alb  3.8  /  TBili  3.1<H>  /  DBili  x   /  AST  195<H>  /  ALT  221<H>  /  AlkPhos  314<H>  05-30          RADIOLOGY & ADDITIONAL TESTS:

## 2023-05-30 NOTE — PROGRESS NOTE ADULT - ASSESSMENT
ASSESSMENT/PLAN:  #Generalized pruritus – given lack of primary rash, Drug-induced hypersensitivity syndrome (DIHS) is lower on the differential. Given elevated bilirubin and mild hepatic steatosis, cholestasis is higher on the differential.   - Would recommend investigating primary cause of pruritus, consider consulting hepatology  - Can consider ordering bile acids  - start sarna lotion BID liberally to entire body.  - For the body: start triamcinolone 0.1% ointment BID for 2 weeks on / 1 week off. Repeat cycles as necessary. Please ask the pharmacy to dispense multiple tubes at once to cover the large body surface area affected.    Patient with generalized pruritus. Both kidney and liver disease may be potential sources for generalized pruritus. Treatment of pruritus will be difficult in the inpatient setting, unless the underlying kidney/liver disease is corrected. May trial topical corticosteroids though I do not suspect this will be very efficacious. Would be cautious about antihistamine use as the risks of sedation/dizziness/anticholinergic effects may outweigh the benefits. The patient does not have evidence of a type I hypersensitivity reaction. Drug-induced hypersensitivity syndrome (DIHS AKA DRESS syndrome) is unlikely given the lack of fever, characteristic rash, and peripheral eosinophilia. Recent drug exposures noted. Please inform dermatology if there any changes on the skin. Rashes may evolve over time.     Suggest further workup of the RACHEL and transaminitis/hyperbilirubinemia. Pending further evaluation, other treatments may be considered.     Recommendations were communicated with the primary team (Dr Early).  The patient's chart was reviewed in addition to being examined at bedside with the dermatology attending. Discussed plans with the dermatology attending, Dr. Chowdhury  Dermatology will continue to follow. Thank you for consulting our service.    Patricia Tee MD MPH  Resident Physician, PGY3  [facility]  Office: [phone]  Pager: [phone]  **Please page with [phone] callback # for further related questions.**

## 2023-05-30 NOTE — CONSULT NOTE ADULT - ASSESSMENT
Impression:     #Elevated liver enzymes  #Hyperbilirubinemia  No prior known liver disease. Reported Atorvastatin use for the past 4-5 months, used Keflex on 5/22 for 7 days for UTI, has been taking OTC collagen tablets x2 per night for the past one month.   - On admission, AST//275, w/ bili 4.2, predominantly direct bilirubin, now has been trending down. Cholestatic liver injury.    - Imaging showed no biliary dilation and has mild hepatic steatosis.   - Other lab serologies: FABIANO 1:80, hep b/c neg, CMV PCR neg.   - Elevated liver enzymes likely medication-related, suspect it might be due to collagen supplements, possible statin or cephalosporin use although less likely.     Recommendations:   - please send alpha-1 anti-trypsin (phenotype), ceruloplasm, AFP  - please send anti-mitochondrial antibody, anti-smooth muscle antibody, anti-liver kidney antibody, immunoglobulins (IgG, IgM, IgA quantitative) for autoimmune etiologies   - can continue with Atorvastatin.   - Trend CMP and PT/INR daily.   - Patient will need close follow up in hepatology clinic within 1-2 weeks after hospital discharge.     All recommendations are tentative until note is attested by an attending.     Denver Whelan, PGY-4  Gastroenterology/Hepatology Fellow  Available on Microsoft Teams  [phone] (Short Range Pager)  [phone] (Long Range Pager)    After 5pm, please contact the on-call GI fellow. [phone]   Impression:     #Elevated liver enzymes  #Hyperbilirubinemia  No prior known liver disease. Reported Atorvastatin use for the past 4-5 months, used Keflex on 5/22 for 7 days for UTI, has been taking OTC collagen tablets x2 per night for the past one month.   - On admission, AST//275, w/ bili 4.2, predominantly direct bilirubin, now has been trending down. Cholestatic liver injury.    - Imaging showed no biliary dilation and has mild hepatic steatosis.   - Other lab serologies: FABIANO 1:80, hep b/c neg, CMV PCR neg.   - Elevated liver enzymes likely medication-related, suspect it might be due to collagen supplements, possible statin or cephalosporin use although less likely.     Recommendations:   - please send alpha-1 anti-trypsin (phenotype), ceruloplasm, AFP  - please send anti-mitochondrial antibody, anti-smooth muscle antibody, anti-liver kidney antibody, immunoglobulins (IgG, IgM, IgA quantitative) for autoimmune etiologies   - please hold off on Atorvastatin.   - Trend CMP and PT/INR daily.   - Patient will need close follow up in hepatology clinic within 1-2 weeks after hospital discharge.     All recommendations are tentative until note is attested by an attending.     Denver Whelan, PGY-4  Gastroenterology/Hepatology Fellow  Available on Microsoft Teams  [phone] (Short Range Pager)  [phone] (Long Range Pager)    After 5pm, please contact the on-call GI fellow. [phone]   Impression:     #Elevated liver enzymes  #Hyperbilirubinemia  No prior known liver disease. Reported Atorvastatin use for the past 4-5 months, used Keflex on 5/22 for 7 days for UTI, has been taking OTC collagen tablets x2 per night for the past one month.   - On admission, AST//275, w/ bili 4.2, predominantly direct bilirubin, now has been trending down. Cholestatic liver injury.    - Imaging showed no biliary dilation and has mild hepatic steatosis.   - Other lab serologies: FABIANO 1:80, hep b/c neg, CMV PCR neg.   - Elevated liver enzymes likely medication-related, suspect it might be due to collagen supplements, possible statin or cephalosporin use although less likely. Other differentials include possible PBC, PSC, etc.     Recommendations:   - MRCP pending.   - Can start her on ursodiol 13mg per kg, will alleviate pruritis.   - please send alpha-1 anti-trypsin (phenotype), ceruloplasm, AFP  - please send anti-mitochondrial antibody, anti-smooth muscle antibody, anti-liver kidney antibody, immunoglobulins (IgG, IgM, IgA quantitative) for autoimmune etiologies   - please hold off on Atorvastatin.   - Trend CMP and PT/INR daily.     All recommendations are tentative until note is attested by an attending.     Denver Whelan, PGY-4  Gastroenterology/Hepatology Fellow  Available on Microsoft Teams  [phone] (Short Range Pager)  [phone] (Long Range Pager)    After 5pm, please contact the on-call GI fellow. [phone]

## 2023-05-31 LAB
A1C WITH ESTIMATED AVERAGE GLUCOSE RESULT: 5.7 % — HIGH (ref 4–5.6)
ALBUMIN SERPL ELPH-MCNC: 3.8 G/DL — SIGNIFICANT CHANGE UP (ref 3.3–5)
ALP SERPL-CCNC: 296 U/L — HIGH (ref 40–120)
ALT FLD-CCNC: 197 U/L — HIGH (ref 4–33)
ANION GAP SERPL CALC-SCNC: 14 MMOL/L — SIGNIFICANT CHANGE UP (ref 7–14)
APTT BLD: 98.5 SEC — HIGH (ref 27–36.3)
AST SERPL-CCNC: 164 U/L — HIGH (ref 4–32)
BASOPHILS # BLD AUTO: 0.06 K/UL — SIGNIFICANT CHANGE UP (ref 0–0.2)
BASOPHILS NFR BLD AUTO: 0.8 % — SIGNIFICANT CHANGE UP (ref 0–2)
BILIRUB SERPL-MCNC: 2.1 MG/DL — HIGH (ref 0.2–1.2)
BUN SERPL-MCNC: 10 MG/DL — SIGNIFICANT CHANGE UP (ref 7–23)
CALCIUM SERPL-MCNC: 9.6 MG/DL — SIGNIFICANT CHANGE UP (ref 8.4–10.5)
CHLORIDE SERPL-SCNC: 103 MMOL/L — SIGNIFICANT CHANGE UP (ref 98–107)
CO2 SERPL-SCNC: 21 MMOL/L — LOW (ref 22–31)
CREAT SERPL-MCNC: 1.17 MG/DL — SIGNIFICANT CHANGE UP (ref 0.5–1.3)
CULTURE RESULTS: SIGNIFICANT CHANGE UP
CULTURE RESULTS: SIGNIFICANT CHANGE UP
EGFR: 46 ML/MIN/1.73M2 — LOW
EOSINOPHIL # BLD AUTO: 0.57 K/UL — HIGH (ref 0–0.5)
EOSINOPHIL NFR BLD AUTO: 7.8 % — HIGH (ref 0–6)
ESTIMATED AVERAGE GLUCOSE: 117 — SIGNIFICANT CHANGE UP
GLUCOSE SERPL-MCNC: 111 MG/DL — HIGH (ref 70–99)
HCT VFR BLD CALC: 35 % — SIGNIFICANT CHANGE UP (ref 34.5–45)
HGB BLD-MCNC: 11.5 G/DL — SIGNIFICANT CHANGE UP (ref 11.5–15.5)
IANC: 3 K/UL — SIGNIFICANT CHANGE UP (ref 1.8–7.4)
IMM GRANULOCYTES NFR BLD AUTO: 0.3 % — SIGNIFICANT CHANGE UP (ref 0–0.9)
INR BLD: 1.04 RATIO — SIGNIFICANT CHANGE UP (ref 0.88–1.16)
LYMPHOCYTES # BLD AUTO: 3.18 K/UL — SIGNIFICANT CHANGE UP (ref 1–3.3)
LYMPHOCYTES # BLD AUTO: 43.4 % — SIGNIFICANT CHANGE UP (ref 13–44)
MAGNESIUM SERPL-MCNC: 1.7 MG/DL — SIGNIFICANT CHANGE UP (ref 1.6–2.6)
MCHC RBC-ENTMCNC: 29.3 PG — SIGNIFICANT CHANGE UP (ref 27–34)
MCHC RBC-ENTMCNC: 32.9 GM/DL — SIGNIFICANT CHANGE UP (ref 32–36)
MCV RBC AUTO: 89.3 FL — SIGNIFICANT CHANGE UP (ref 80–100)
MONOCYTES # BLD AUTO: 0.49 K/UL — SIGNIFICANT CHANGE UP (ref 0–0.9)
MONOCYTES NFR BLD AUTO: 6.7 % — SIGNIFICANT CHANGE UP (ref 2–14)
NEUTROPHILS # BLD AUTO: 3 K/UL — SIGNIFICANT CHANGE UP (ref 1.8–7.4)
NEUTROPHILS NFR BLD AUTO: 41 % — LOW (ref 43–77)
NRBC # BLD: 0 /100 WBCS — SIGNIFICANT CHANGE UP (ref 0–0)
NRBC # FLD: 0 K/UL — SIGNIFICANT CHANGE UP (ref 0–0)
PHOSPHATE SERPL-MCNC: 3.6 MG/DL — SIGNIFICANT CHANGE UP (ref 2.5–4.5)
PLATELET # BLD AUTO: 171 K/UL — SIGNIFICANT CHANGE UP (ref 150–400)
POTASSIUM SERPL-MCNC: 4.2 MMOL/L — SIGNIFICANT CHANGE UP (ref 3.5–5.3)
POTASSIUM SERPL-SCNC: 4.2 MMOL/L — SIGNIFICANT CHANGE UP (ref 3.5–5.3)
PROT SERPL-MCNC: 6.7 G/DL — SIGNIFICANT CHANGE UP (ref 6–8.3)
PROTHROM AB SERPL-ACNC: 12.1 SEC — SIGNIFICANT CHANGE UP (ref 10.5–13.4)
RBC # BLD: 3.92 M/UL — SIGNIFICANT CHANGE UP (ref 3.8–5.2)
RBC # FLD: 13.5 % — SIGNIFICANT CHANGE UP (ref 10.3–14.5)
SODIUM SERPL-SCNC: 138 MMOL/L — SIGNIFICANT CHANGE UP (ref 135–145)
SPECIMEN SOURCE: SIGNIFICANT CHANGE UP
SPECIMEN SOURCE: SIGNIFICANT CHANGE UP
WBC # BLD: 7.32 K/UL — SIGNIFICANT CHANGE UP (ref 3.8–10.5)
WBC # FLD AUTO: 7.32 K/UL — SIGNIFICANT CHANGE UP (ref 3.8–10.5)

## 2023-05-31 PROCEDURE — 74183 MRI ABD W/O CNTR FLWD CNTR: CPT | Mod: 26

## 2023-05-31 PROCEDURE — 99232 SBSQ HOSP IP/OBS MODERATE 35: CPT

## 2023-05-31 RX ADMIN — HEPARIN SODIUM 5000 UNIT(S): 5000 INJECTION INTRAVENOUS; SUBCUTANEOUS at 09:15

## 2023-05-31 RX ADMIN — Medication 3 MILLIGRAM(S): at 22:00

## 2023-05-31 RX ADMIN — GABAPENTIN 100 MILLIGRAM(S): 400 CAPSULE ORAL at 11:33

## 2023-05-31 RX ADMIN — HEPARIN SODIUM 5000 UNIT(S): 5000 INJECTION INTRAVENOUS; SUBCUTANEOUS at 01:35

## 2023-05-31 RX ADMIN — URSODIOL 500 MILLIGRAM(S): 250 TABLET, FILM COATED ORAL at 17:36

## 2023-05-31 RX ADMIN — Medication 1 APPLICATION(S): at 05:35

## 2023-05-31 RX ADMIN — URSODIOL 500 MILLIGRAM(S): 250 TABLET, FILM COATED ORAL at 05:34

## 2023-05-31 RX ADMIN — PANTOPRAZOLE SODIUM 40 MILLIGRAM(S): 20 TABLET, DELAYED RELEASE ORAL at 05:33

## 2023-05-31 RX ADMIN — Medication 1 APPLICATION(S): at 17:36

## 2023-05-31 RX ADMIN — HEPARIN SODIUM 5000 UNIT(S): 5000 INJECTION INTRAVENOUS; SUBCUTANEOUS at 17:36

## 2023-05-31 RX ADMIN — Medication 1 DROP(S): at 11:33

## 2023-05-31 RX ADMIN — Medication 75 MICROGRAM(S): at 05:33

## 2023-05-31 NOTE — PROGRESS NOTE ADULT - PROBLEM SELECTOR PLAN 2
New transaminitis, reportedly normal on outpatient lab 4/2023  - presenting with new mixed cholestatic and hepatocellular acute injury pattern  - unclear etiology, but given conjunction of symptoms with pruritus, and eosinophilia must rule out DIHS  - RUQ US remarkable only for hepatic steatosis  - DDx is broad and includes viral, autoimmune, drug-induced liver injury. DIHS, PBC  - continuing to downtrend but remains elevated    Plan:  > follow up remaining infectious/autoimmune workup, thus far unremarkable  > hepatology consulted, follow up recs  > may consider further eval with MRCP or further tx with steroids +/- symptomatic relief with bile acid sequestrants as above New transaminitis, reportedly normal on outpatient lab 4/2023  - presenting with new mixed cholestatic and hepatocellular acute injury pattern  - unclear etiology, but given conjunction of symptoms with pruritus, and eosinophilia must rule out DIHS  - RUQ US remarkable only for hepatic steatosis  - DDx is broad and includes viral, autoimmune, drug-induced liver injury. DIHS, PBC  - continuing to downtrend but remains elevated    Plan:  > follow up remaining infectious/autoimmune workup, thus far unremarkable  > MRCP done 5/31, follow up read

## 2023-05-31 NOTE — PROGRESS NOTE ADULT - ASSESSMENT
82 year old F with T2DM, hypothyroidism, HTN, HLD, recent UTI s/p course of keflex 5 days prior, presents for 3 days of diffuse pruritus without obvious rash. Patient incidentally found to have transaminitis consistent with mixed hepatocellular/cholestatic pattern of injury, along with acute renal injury. Patient now with persistent transaminitis, potentially due to DIHS vs intrahepatic pathology.

## 2023-05-31 NOTE — PROGRESS NOTE ADULT - PROBLEM SELECTOR PLAN 1
Patient presenting with 3 days of diffuse pruritus, without reported rash  - given evidence of systemic injury, including transaminitis and acute renal injury, eosinophilia, must rule out DIHS  - other differential includes cholestasis induced pruritus or other mild adverse drug reaction  - on 5/29, eos count rising and now >0.5 indicating peripheral eosinophilia  - remains with high suspicion for DIHS 2/2 to cephalosporin completed outpatient; no other obvious etiology of transaminitis    Plan:  > CTM skin for evolving rashes  > can consider ursodiol cholestyramine for symptomatic relief Patient presenting with 3 days of diffuse pruritus, without reported rash  - given evidence of systemic injury, including transaminitis and acute renal injury, eosinophilia, must rule out DIHS  - other differential includes cholestasis induced pruritus or other mild adverse drug reaction  - on 5/29, eos count rising and now >0.5 indicating peripheral eosinophilia  - remains with high suspicion for DIHS 2/2 to cephalosporin completed outpatient; no other obvious etiology of transaminitis  - pt with significant symptomatic improvement with ursodiol    Plan:  > CTM skin for evolving rashes  > continue with ursodiol

## 2023-06-01 ENCOUNTER — TRANSCRIPTION ENCOUNTER (OUTPATIENT)
Age: 83
End: 2023-06-01

## 2023-06-01 VITALS
SYSTOLIC BLOOD PRESSURE: 142 MMHG | TEMPERATURE: 99 F | OXYGEN SATURATION: 95 % | RESPIRATION RATE: 17 BRPM | HEART RATE: 75 BPM | DIASTOLIC BLOOD PRESSURE: 65 MMHG

## 2023-06-01 LAB
ALBUMIN SERPL ELPH-MCNC: 3.9 G/DL — SIGNIFICANT CHANGE UP (ref 3.3–5)
ALP SERPL-CCNC: 279 U/L — HIGH (ref 40–120)
ALT FLD-CCNC: 180 U/L — HIGH (ref 4–33)
ANION GAP SERPL CALC-SCNC: 13 MMOL/L — SIGNIFICANT CHANGE UP (ref 7–14)
AST SERPL-CCNC: 146 U/L — HIGH (ref 4–32)
BASOPHILS # BLD AUTO: 0.06 K/UL — SIGNIFICANT CHANGE UP (ref 0–0.2)
BASOPHILS NFR BLD AUTO: 0.8 % — SIGNIFICANT CHANGE UP (ref 0–2)
BILIRUB SERPL-MCNC: 1.5 MG/DL — HIGH (ref 0.2–1.2)
BUN SERPL-MCNC: 10 MG/DL — SIGNIFICANT CHANGE UP (ref 7–23)
CALCIUM SERPL-MCNC: 9.5 MG/DL — SIGNIFICANT CHANGE UP (ref 8.4–10.5)
CHLORIDE SERPL-SCNC: 102 MMOL/L — SIGNIFICANT CHANGE UP (ref 98–107)
CO2 SERPL-SCNC: 20 MMOL/L — LOW (ref 22–31)
CREAT SERPL-MCNC: 1.03 MG/DL — SIGNIFICANT CHANGE UP (ref 0.5–1.3)
EGFR: 53 ML/MIN/1.73M2 — LOW
EOSINOPHIL # BLD AUTO: 0.57 K/UL — HIGH (ref 0–0.5)
EOSINOPHIL NFR BLD AUTO: 8.1 % — HIGH (ref 0–6)
GLUCOSE SERPL-MCNC: 117 MG/DL — HIGH (ref 70–99)
HCT VFR BLD CALC: 37.1 % — SIGNIFICANT CHANGE UP (ref 34.5–45)
HGB BLD-MCNC: 12 G/DL — SIGNIFICANT CHANGE UP (ref 11.5–15.5)
IANC: 2.98 K/UL — SIGNIFICANT CHANGE UP (ref 1.8–7.4)
IMM GRANULOCYTES NFR BLD AUTO: 0.6 % — SIGNIFICANT CHANGE UP (ref 0–0.9)
LKM AB SER-ACNC: <20.1 UNITS — SIGNIFICANT CHANGE UP (ref 0–20)
LKM AB SER-ACNC: <20.1 UNITS — SIGNIFICANT CHANGE UP (ref 0–20)
LYMPHOCYTES # BLD AUTO: 2.89 K/UL — SIGNIFICANT CHANGE UP (ref 1–3.3)
LYMPHOCYTES # BLD AUTO: 40.9 % — SIGNIFICANT CHANGE UP (ref 13–44)
MAGNESIUM SERPL-MCNC: 1.9 MG/DL — SIGNIFICANT CHANGE UP (ref 1.6–2.6)
MCHC RBC-ENTMCNC: 30 PG — SIGNIFICANT CHANGE UP (ref 27–34)
MCHC RBC-ENTMCNC: 32.3 GM/DL — SIGNIFICANT CHANGE UP (ref 32–36)
MCV RBC AUTO: 92.8 FL — SIGNIFICANT CHANGE UP (ref 80–100)
MONOCYTES # BLD AUTO: 0.52 K/UL — SIGNIFICANT CHANGE UP (ref 0–0.9)
MONOCYTES NFR BLD AUTO: 7.4 % — SIGNIFICANT CHANGE UP (ref 2–14)
NEUTROPHILS # BLD AUTO: 2.98 K/UL — SIGNIFICANT CHANGE UP (ref 1.8–7.4)
NEUTROPHILS NFR BLD AUTO: 42.2 % — LOW (ref 43–77)
NRBC # BLD: 0 /100 WBCS — SIGNIFICANT CHANGE UP (ref 0–0)
NRBC # FLD: 0 K/UL — SIGNIFICANT CHANGE UP (ref 0–0)
PHOSPHATE SERPL-MCNC: 3.3 MG/DL — SIGNIFICANT CHANGE UP (ref 2.5–4.5)
PLATELET # BLD AUTO: 175 K/UL — SIGNIFICANT CHANGE UP (ref 150–400)
POTASSIUM SERPL-MCNC: 4.2 MMOL/L — SIGNIFICANT CHANGE UP (ref 3.5–5.3)
POTASSIUM SERPL-SCNC: 4.2 MMOL/L — SIGNIFICANT CHANGE UP (ref 3.5–5.3)
PROT SERPL-MCNC: 6.9 G/DL — SIGNIFICANT CHANGE UP (ref 6–8.3)
RBC # BLD: 4 M/UL — SIGNIFICANT CHANGE UP (ref 3.8–5.2)
RBC # FLD: 13.7 % — SIGNIFICANT CHANGE UP (ref 10.3–14.5)
SODIUM SERPL-SCNC: 135 MMOL/L — SIGNIFICANT CHANGE UP (ref 135–145)
WBC # BLD: 7.19 K/UL — SIGNIFICANT CHANGE UP (ref 3.8–10.5)
WBC # FLD AUTO: 7.19 K/UL — SIGNIFICANT CHANGE UP (ref 3.8–10.5)

## 2023-06-01 PROCEDURE — 99239 HOSP IP/OBS DSCHRG MGMT >30: CPT

## 2023-06-01 RX ORDER — POLYETHYLENE GLYCOL 3350 17 G/17G
17 POWDER, FOR SOLUTION ORAL
Qty: 0 | Refills: 0 | DISCHARGE
Start: 2023-06-01

## 2023-06-01 RX ORDER — ATORVASTATIN CALCIUM 80 MG/1
1 TABLET, FILM COATED ORAL
Qty: 0 | Refills: 0 | DISCHARGE

## 2023-06-01 RX ORDER — POLYETHYLENE GLYCOL 3350 17 G/17G
17 POWDER, FOR SOLUTION ORAL
Refills: 0 | Status: DISCONTINUED | OUTPATIENT
Start: 2023-06-01 | End: 2023-06-01

## 2023-06-01 RX ORDER — ACETAMINOPHEN 500 MG
2 TABLET ORAL
Refills: 0 | DISCHARGE

## 2023-06-01 RX ORDER — URSODIOL 250 MG/1
1 TABLET, FILM COATED ORAL
Qty: 56 | Refills: 0
Start: 2023-06-01 | End: 2023-06-28

## 2023-06-01 RX ORDER — URSODIOL 250 MG/1
1 TABLET, FILM COATED ORAL
Qty: 0 | Refills: 0 | DISCHARGE
Start: 2023-06-01

## 2023-06-01 RX ORDER — SENNA PLUS 8.6 MG/1
2 TABLET ORAL AT BEDTIME
Refills: 0 | Status: DISCONTINUED | OUTPATIENT
Start: 2023-06-01 | End: 2023-06-01

## 2023-06-01 RX ORDER — SENNA PLUS 8.6 MG/1
2 TABLET ORAL
Qty: 0 | Refills: 0 | DISCHARGE
Start: 2023-06-01

## 2023-06-01 RX ADMIN — AMLODIPINE BESYLATE 2.5 MILLIGRAM(S): 2.5 TABLET ORAL at 05:31

## 2023-06-01 RX ADMIN — HEPARIN SODIUM 5000 UNIT(S): 5000 INJECTION INTRAVENOUS; SUBCUTANEOUS at 08:43

## 2023-06-01 RX ADMIN — Medication 75 MICROGRAM(S): at 05:31

## 2023-06-01 RX ADMIN — GABAPENTIN 100 MILLIGRAM(S): 400 CAPSULE ORAL at 11:44

## 2023-06-01 RX ADMIN — PANTOPRAZOLE SODIUM 40 MILLIGRAM(S): 20 TABLET, DELAYED RELEASE ORAL at 05:30

## 2023-06-01 RX ADMIN — Medication 1 APPLICATION(S): at 05:31

## 2023-06-01 RX ADMIN — URSODIOL 500 MILLIGRAM(S): 250 TABLET, FILM COATED ORAL at 05:30

## 2023-06-01 RX ADMIN — Medication 1 DROP(S): at 11:44

## 2023-06-01 NOTE — PROGRESS NOTE ADULT - PROBLEM SELECTOR PLAN 1
Patient presenting with 3 days of diffuse pruritus, without reported rash  - given evidence of systemic injury, including transaminitis and acute renal injury, eosinophilia, must rule out DIHS  - other differential includes cholestasis induced pruritus or other mild adverse drug reaction  - on 5/29, eos count rising and now >0.5 indicating peripheral eosinophilia  - remains with high suspicion for DIHS 2/2 to cephalosporin completed outpatient; no other obvious etiology of transaminitis  - pt with significant symptomatic improvement with ursodiol    Plan:  > CTM skin for evolving rashes  > continue with ursodiol

## 2023-06-01 NOTE — PROGRESS NOTE ADULT - PROBLEM SELECTOR PLAN 2
New transaminitis, reportedly normal on outpatient lab 4/2023  - presenting with new mixed cholestatic and hepatocellular acute injury pattern  - unclear etiology, but given conjunction of symptoms with pruritus, and eosinophilia must rule out DIHS  - RUQ US remarkable only for hepatic steatosis  - DDx is broad and includes viral, autoimmune, drug-induced liver injury. DIHS, PBC  - continuing to downtrend but remains elevated    Plan:  > follow up remaining infectious/autoimmune workup, thus far unremarkable  > MRCP done 5/31, follow up read

## 2023-06-01 NOTE — CHART NOTE - NSCHARTNOTEFT_GEN_A_CORE
Liver enzymes and the bilirubin have been trending down, concerned that it might be due to medication, possible collagen supplements. Other serology markers have been neg so far. MRCP showed no bile duct dilation.    Recommendations:   - continue with ursodiol for now.   - Please continue to hold atorvastatin for now, can consider starting it as o/p when the liver enzymes continue to trend down.   - Patient will need to follow up in hepatology clinic within 2-3 weeks after hospital discharge. Will reach out to our coordinators for an appt.     This case was discussed with Dr. Pacheco.     GI will plan to sign off at this time. Please feel free to reach out to our team with any follow up questions. Please provide patient with Gastroenterology Clinic number to confirm/arrange appointment; [phone] ([facility] at [address] [place] [address]) or [phone] ([facility] at [address] [place] [address]) or [phone] ([facility] at [address] [facility]) or hepatology office at [phone]    Denver Whelan, PGY-4  Gastroenterology/Hepatology Fellow  Available on Microsoft Teams  [phone] (Short Range Pager)  [phone] (Long Range Pager)    After 5pm, please contact the on-call GI fellow. [phone]

## 2023-06-01 NOTE — DISCHARGE NOTE PROVIDER - ATTENDING DISCHARGE PHYSICAL EXAMINATION:
82 year old F with T2DM, hypothyroidism, HTN, HLD, recent UTI s/p course of keflex 5 days prior, presents for 3 days of diffuse pruritus without obvious rash, found to have eosinophilia, transaminitis and RACHEL. Suspect medication induced (keflex vs. statin). Lower c/f DIHS per derm, recs topical steroid. Hepatology consulted recs ursodiol for pruritus and MRCP. MRCP obtained and wnl. RACHEL now resolved, transaminitis downtrending. Pruritus improving. Hemodynamically stable to follow up w/ hepatology outpatient.    GENERAL: NAD  HEAD:  Atraumatic, normocephalic  EYES: EOMI, PERRLA, conjunctiva and sclera clear  ENT: Moist mucous membranes  NECK: Supple  HEART: Regular rate and rhythm, no murmurs, rubs, or gallops  LUNGS:  Clear to auscultation bilaterally, no crackles, wheezing, or rhonchi  ABDOMEN: Soft, nontender, nondistended, bowel sounds normal   EXTREMITIES: 2+ peripheral pulses bilaterally. No  edema  NERVOUS SYSTEM:  A&Ox3, no focal deficits   SKIN: No rashes or lesions

## 2023-06-01 NOTE — PROGRESS NOTE ADULT - SUBJECTIVE AND OBJECTIVE BOX
PROGRESS NOTE:     Patient is a 83y old  Female who presents with a chief complaint of Transaminitis, RACHEL, Pruritus (31 May 2023 07:17)      ---------------------------------------------------  Stewart Early  PGY-1, Internal Medicine  Available on [employer] Teams  Pager: [phone] ([facility]), or [phone] ([facility])  ---------------------------------------------------    INTERVAL / OVERNIGHT EVENTS:  No acute events overnight.     SUBJECTIVE:  Patient examined at bedside with no acute complaints.       BRIEF DAILY PLAN:    Pain:  Bowel Movements:  Urination:  OOB:  PT:    REVIEW OF SYSTEMS:    CONSTITUTIONAL: No weakness, fevers or chills  EYES/ENT: No visual changes;  No vertigo or throat pain   NECK: No pain or stiffness  RESPIRATORY: No cough, wheezing, hemoptysis; No shortness of breath  CARDIOVASCULAR: No chest pain or palpitations  GASTROINTESTINAL: No abdominal or epigastric pain. No nausea, vomiting, or hematemesis; No diarrhea or constipation. No melena or hematochezia.  GENITOURINARY: No dysuria, frequency or hematuria  NEUROLOGICAL: No numbness or weakness  SKIN: No itching, rashes      MEDICATIONS  (STANDING):  amLODIPine   Tablet 2.5 milliGRAM(s) Oral daily  gabapentin 100 milliGRAM(s) Oral daily  heparin   Injectable 5000 Unit(s) SubCutaneous every 8 hours  levothyroxine 75 MICROGram(s) Oral daily  melatonin 3 milliGRAM(s) Oral at bedtime  pantoprazole    Tablet 40 milliGRAM(s) Oral before breakfast  timolol 0.5% Solution 1 Drop(s) Both EYES daily  triamcinolone 0.1% Ointment 1 Application(s) Topical every 12 hours  ursodiol Tablet 500 milliGRAM(s) Oral every 12 hours    MEDICATIONS  (PRN):      CAPILLARY BLOOD GLUCOSE        I&O's Summary      VITALS:   T(C): 36.4 (06-01-23 @ 05:30), Max: 36.7 (05-31-23 @ 12:54)  HR: 77 (06-01-23 @ 05:30) (66 - 77)  BP: 136/60 (06-01-23 @ 05:30) (130/60 - 136/60)  RR: 18 (06-01-23 @ 05:30) (18 - 18)  SpO2: 97% (06-01-23 @ 05:30) (97% - 100%)    GENERAL: NAD, lying in bed comfortably  HEAD:  Atraumatic, normocephalic  EYES: EOMI, PERRLA, conjunctiva and sclera clear  ENT: Moist mucous membranes  NECK: Supple, no JVD  HEART: Regular rate and rhythm, no murmurs, rubs, or gallops  LUNGS: Unlabored respirations.  Clear to auscultation bilaterally, no crackles, wheezing, or rhonchi  ABDOMEN: Soft, nontender, nondistended, +BS  EXTREMITIES: 2+ peripheral pulses bilaterally. No clubbing, cyanosis, or edema  NERVOUS SYSTEM:  A&Ox3, no focal deficits   SKIN: No rashes or lesions    LABS:                        11.5   7.32  )-----------( 171      ( 31 May 2023 06:02 )             35.0     05-31    138  |  103  |  10  ----------------------------<  111<H>  4.2   |  21<L>  |  1.17    Ca    9.6      31 May 2023 06:02  Phos  3.6     05-31  Mg     1.70     05-31    TPro  6.7  /  Alb  3.8  /  TBili  2.1<H>  /  DBili  x   /  AST  164<H>  /  ALT  197<H>  /  AlkPhos  296<H>  05-31    PT/INR - ( 31 May 2023 06:02 )   PT: 12.1 sec;   INR: 1.04 ratio         PTT - ( 31 May 2023 06:02 )  PTT:98.5 sec      RADIOLOGY & ADDITIONAL TESTS:  Results Reviewed:   Imaging Personally Reviewed:  Electrocardiogram Personally Reviewed:    COORDINATION OF CARE:  Care Discussed with Consultants/Other Providers [Y/N]:  Prior or Outpatient Records Reviewed [Y/N]:     PROGRESS NOTE:     Patient is a 83y old  Female who presents with a chief complaint of Transaminitis, RACHEL, Pruritus (31 May 2023 07:17)      ---------------------------------------------------  Stewart Early  PGY-1, Internal Medicine  Available on [employer] Teams  Pager: [phone] ([facility]), or [phone] ([facility])  ---------------------------------------------------    INTERVAL / OVERNIGHT EVENTS:  - No acute events overnight.   - LFTs continuing to downtrend    SUBJECTIVE:  - Reports worsening pruritus last night, and this AM. Overall, improved from admission but worse than past 1-2 days    BRIEF DAILY PLAN:  -     MEDICATIONS  (STANDING):  amLODIPine   Tablet 2.5 milliGRAM(s) Oral daily  gabapentin 100 milliGRAM(s) Oral daily  heparin   Injectable 5000 Unit(s) SubCutaneous every 8 hours  levothyroxine 75 MICROGram(s) Oral daily  melatonin 3 milliGRAM(s) Oral at bedtime  pantoprazole    Tablet 40 milliGRAM(s) Oral before breakfast  timolol 0.5% Solution 1 Drop(s) Both EYES daily  triamcinolone 0.1% Ointment 1 Application(s) Topical every 12 hours  ursodiol Tablet 500 milliGRAM(s) Oral every 12 hours    MEDICATIONS  (PRN):      CAPILLARY BLOOD GLUCOSE        I&O's Summary      VITALS:   T(C): 36.4 (06-01-23 @ 05:30), Max: 36.7 (05-31-23 @ 12:54)  HR: 77 (06-01-23 @ 05:30) (66 - 77)  BP: 136/60 (06-01-23 @ 05:30) (130/60 - 136/60)  RR: 18 (06-01-23 @ 05:30) (18 - 18)  SpO2: 97% (06-01-23 @ 05:30) (97% - 100%)    GENERAL: NAD, lying in bed comfortably  HEAD:  Atraumatic, normocephalic  EYES: EOMI, PERRLA, conjunctiva and sclera clear  ENT: Moist mucous membranes  NECK: Supple, no JVD  HEART: Regular rate and rhythm, no murmurs, rubs, or gallops  LUNGS: Unlabored respirations.  Clear to auscultation bilaterally, no crackles, wheezing, or rhonchi  ABDOMEN: Soft, nontender, nondistended, +BS  EXTREMITIES: 2+ peripheral pulses bilaterally. No clubbing, cyanosis, or edema  NERVOUS SYSTEM:  A&Ox3, no focal deficits   SKIN: No rashes or lesions    LABS:                        11.5   7.32  )-----------( 171      ( 31 May 2023 06:02 )             35.0     05-31    138  |  103  |  10  ----------------------------<  111<H>  4.2   |  21<L>  |  1.17    Ca    9.6      31 May 2023 06:02  Phos  3.6     05-31  Mg     1.70     05-31    TPro  6.7  /  Alb  3.8  /  TBili  2.1<H>  /  DBili  x   /  AST  164<H>  /  ALT  197<H>  /  AlkPhos  296<H>  05-31    PT/INR - ( 31 May 2023 06:02 )   PT: 12.1 sec;   INR: 1.04 ratio         PTT - ( 31 May 2023 06:02 )  PTT:98.5 sec      RADIOLOGY & ADDITIONAL TESTS:  Results Reviewed:   Imaging Personally Reviewed:  Electrocardiogram Personally Reviewed:    COORDINATION OF CARE:  Care Discussed with Consultants/Other Providers [Y/N]:  Prior or Outpatient Records Reviewed [Y/N]:

## 2023-06-01 NOTE — DISCHARGE NOTE PROVIDER - NSDCMRMEDTOKEN_GEN_ALL_CORE_FT
amLODIPine 2.5 mg oral tablet: 1 tab(s) orally once a day  B-12 1000 mcg oral tablet: 1 orally once a day  diclofenac 1% topical gel: Apply topically to affected area  gabapentin 100 mg oral tablet: 1 tab(s) orally once a day  levothyroxine 75 mcg (0.075 mg) oral tablet: 1 orally once a day  Linzess 72 mcg oral capsule: 1 orally once a day  losartan 100 mg oral tablet: 1 orally once a day  pantoprazole 40 mg oral delayed release tablet: 1 orally once a day  polyethylene glycol 3350 oral powder for reconstitution: 17 gram(s) orally 2 times a day  senna leaf extract oral tablet: 2 tab(s) orally once a day (at bedtime)  timolol hemihydrate 0.5% ophthalmic solution: 1 application in each affected eye once a day  triamcinolone 0.1% topical ointment: 1 Apply topically to affected area every 12 hours  ursodiol 500 mg oral tablet: 1 tab(s) orally every 12 hours   amLODIPine 2.5 mg oral tablet: 1 tab(s) orally once a day  B-12 1000 mcg oral tablet: 1 orally once a day  diclofenac 1% topical gel: Apply topically to affected area  gabapentin 100 mg oral tablet: 1 tab(s) orally once a day  levothyroxine 75 mcg (0.075 mg) oral tablet: 1 orally once a day  Linzess 72 mcg oral capsule: 1 orally once a day  losartan 100 mg oral tablet: 1 orally once a day  pantoprazole 40 mg oral delayed release tablet: 1 orally once a day  polyethylene glycol 3350 oral powder for reconstitution: 17 gram(s) orally 2 times a day  senna leaf extract oral tablet: 2 tab(s) orally once a day (at bedtime)  timolol hemihydrate 0.5% ophthalmic solution: 1 application in each affected eye once a day  triamcinolone 0.1% topical ointment: Apply topically to affected area 2 times a day 1 Apply topically to affected area every 12 hours MDD: twice a day  triamcinolone 0.1% topical ointment: 1 Apply topically to affected area every 12 hours  ursodiol 500 mg oral tablet: 1 tab(s) orally every 12 hours   amLODIPine 2.5 mg oral tablet: 1 tab(s) orally once a day  B-12 1000 mcg oral tablet: 1 orally once a day  diclofenac 1% topical gel: Apply topically to affected area  gabapentin 100 mg oral tablet: 1 tab(s) orally once a day  levothyroxine 75 mcg (0.075 mg) oral tablet: 1 orally once a day  Linzess 72 mcg oral capsule: 1 orally once a day  losartan 100 mg oral tablet: 1 orally once a day  pantoprazole 40 mg oral delayed release tablet: 1 orally once a day  Physical Therapy: Outpatient physical therapy as indicated  polyethylene glycol 3350 oral powder for reconstitution: 17 gram(s) orally 2 times a day  senna leaf extract oral tablet: 2 tab(s) orally once a day (at bedtime)  timolol hemihydrate 0.5% ophthalmic solution: 1 application in each affected eye once a day  triamcinolone 0.1% topical ointment: Apply topically to affected area 2 times a day 1 Apply topically to affected area every 12 hours MDD: twice a day  triamcinolone 0.1% topical ointment: 1 Apply topically to affected area every 12 hours  ursodiol 500 mg oral tablet: 1 tab(s) orally every 12 hours

## 2023-06-01 NOTE — PROGRESS NOTE ADULT - REASON FOR ADMISSION
Transaminitis, RACHEL, Pruritus

## 2023-06-01 NOTE — DISCHARGE NOTE NURSING/CASE MANAGEMENT/SOCIAL WORK - PATIENT PORTAL LINK FT
You can access the FollowMyHealth Patient Portal offered by [facility] by registering at the following website: [email or web address]. By joining [facility]’s FollowMyHealth portal, you will also be able to view your health information using other applications (apps) compatible with our system.

## 2023-06-01 NOTE — DISCHARGE NOTE PROVIDER - NSDCCPCAREPLAN_GEN_ALL_CORE_FT
PRINCIPAL DISCHARGE DIAGNOSIS  Diagnosis: Pruritus  Assessment and Plan of Treatment: You presented to the hospital for diffuse pruritus, or itching for the past few days. This is likely because you had an elevated bilirubin which can be broken down into bile salts, which can cause itching. This likely happened because you had an acute liver injury, possibly from the antibiotic that you completed for your UTI. You were treated with a medication called Ursodiol which helps decrease your bile acids, and can decrease itching as a result.  Please avoid KEFLEX in the future as this should now be considered a medication you are allergic to.  Please continue to take Ursodiol 500 mg twice a day for 7 days to continue to decrease itching.  Please continue to take Triamcinolone ointment twice a day for two weeks.  Please follow up with your PCP for further management.      SECONDARY DISCHARGE DIAGNOSES  Diagnosis: Liver injury  Assessment and Plan of Treatment: You were found to have evidence of elevated liver enzymes which is likely due to an acute liver injury. You underwent an extensive workup and no autoimmune, infectious, or structural causes were identified. You likely developed this liver injury as a side effect of the antibiotic, called Keflex, that you took for your UTI. Your liver enzymes continued to trend down and improved on their own. Please AVOID keflex moving forward as you have an allergy to this medication and it may induce another drug induced hypersensitivty reaction. Please follow up with the hepatologist for further management and evaluation.     PRINCIPAL DISCHARGE DIAGNOSIS  Diagnosis: Pruritus  Assessment and Plan of Treatment: You presented to the hospital for diffuse pruritus, or itching for the past few days. This is likely because you had an elevated bilirubin which can be broken down into bile salts, which can cause itching. This likely happened because you had an acute liver injury, possibly from the antibiotic that you completed for your UTI. You were treated with a medication called Ursodiol which helps decrease your bile acids, and can decrease itching as a result.  Please avoid KEFLEX in the future as this should now be considered a medication you are allergic to.  Please continue to take Ursodiol 500 mg twice a day for 7 days to continue to decrease itching.  Please continue to take Triamcinolone ointment twice a day for two weeks.  Please follow up with your PCP for further management.      SECONDARY DISCHARGE DIAGNOSES  Diagnosis: Liver injury  Assessment and Plan of Treatment: You were found to have evidence of elevated liver enzymes which is likely due to an acute liver injury. You underwent an extensive workup and no autoimmune, infectious, or structural causes were identified. You likely developed this liver injury as a side effect of the antibiotic, called Keflex, that you took for your UTI. Your liver enzymes continued to trend down and improved on their own. Please AVOID keflex moving forward as you have an allergy to this medication and it may induce another drug induced hypersensitivty reaction. Please avoid atorvastatin until you see hepatology. Please follow up with the hepatologist for further management and evaluation.

## 2023-06-01 NOTE — DISCHARGE NOTE PROVIDER - HOSPITAL COURSE
Patient is a 82 year old F with T2DM, hypothyroidism, HTN, HLD, hx of diverticulitis, and recent UTI (completed keflex on 5/22) presents for 3 days of diffuse pruritus. The patient reports that she was visiting her friend who was admitted at our hospital on 5/23 and began feeling pruritic that night. She reports persistent diffuse pruritus since then, not localized to any one particular part of her body. She denies any new rashes or skin lesions, and only endorses "redness" and excoriations after itching in a particular location. She denies any of the same erythema independent of active itching. She denies any new medications aside from the antibiotic which she completed the day prior to when her pruritus began. She also reports a decreased appetite and little to no po intake over the past 2 days as well as headaches since 5/23. She denies any abdominal pain, fevers, or chills.    Hospital Course:  The patient was admitted for pruritus, transaminitis, and RACHEL. Given the patient's symptomatology and labs, dermatology was consulted to rule out DRESS/DIHS. The patient underwent an extensive workup for autoimmune and infectious causes of hepatitis, all of which was unremarkable. The patient underwent a RUQ US which revealed only known hepatic steatosis without any biliary pathology. The patient was started on triamcinolone ointment for pruritus and later on ursodiol for symptomatic relief. The patient also underwent evaluation with an MRCP which revealed no acute findings. The patient's transaminases continued to downtrend and the patient symptomatically improved. The patient was deemed medically stable for discharge with instructions to closely follow with her PCP and with hepatology.     Medication Changes:  - Please STOP atorvastatin, and follow up with your PCP to determine when it is safe to resume  - Please START triamcinolonne ointment 0.1% TWICE A DAY to wherever you have itching  - Please START ursodiol 500 mg TWICE A DAY for the next two weeks, or until you see your PCP  - Please DO NOT take keflex in the future, as it is possible this medication caused your symptoms. Please note that this medication should be considered an allergy for you. Patient is a 82 year old F with T2DM, hypothyroidism, HTN, HLD, hx of diverticulitis, and recent UTI (completed keflex on 5/22) presents for 3 days of diffuse pruritus. The patient reports that she was visiting her friend who was admitted at our hospital on 5/23 and began feeling pruritic that night. She reports persistent diffuse pruritus since then, not localized to any one particular part of her body. She denies any new rashes or skin lesions, and only endorses "redness" and excoriations after itching in a particular location. She denies any of the same erythema independent of active itching. She denies any new medications aside from the antibiotic which she completed the day prior to when her pruritus began. She also reports a decreased appetite and little to no po intake over the past 2 days as well as headaches since 5/23. She denies any abdominal pain, fevers, or chills.    Hospital Course:  The patient was admitted for pruritus, transaminitis, and RACHEL. Given the patient's symptomatology and labs, dermatology was consulted to rule out DRESS/DIHS. The patient underwent an extensive workup for autoimmune and infectious causes of hepatitis, all of which was unremarkable. The patient underwent a RUQ US which revealed only known hepatic steatosis without any biliary pathology. The patient was started on triamcinolone ointment for pruritus and later on ursodiol for symptomatic relief. The patient also underwent evaluation with an MRCP which revealed no acute findings. The patient's transaminases continued to downtrend and the patient symptomatically improved. The patient was deemed medically stable for discharge with instructions to closely follow with her PCP and with hepatology.     Medication Changes:  - Please STOP atorvastatin, and follow up with your PCP to determine when it is safe to resume  - Please START triamcinolonne ointment 0.1% TWICE A DAY to wherever you have itching  - Please START ursodiol 500 mg TWICE A DAY until you see the hepatologist  - Please DO NOT take keflex in the future, as it is possible this medication caused your symptoms. Please note that this medication should be considered an allergy for you.

## 2023-06-01 NOTE — PROGRESS NOTE ADULT - ATTENDING COMMENTS
82 year old F with T2DM, hypothyroidism, HTN, HLD, recent UTI s/p course of keflex 5 days prior, presents for 3 days of diffuse pruritus without obvious rash, found to have eosinophilia, transaminitis and RACHEL.    #Pruritus  -Overall improving  -Initially c/f DIHS, though less likely per derm given lack of primary rash  -Also w/ transaminitis (see below) and RACHEL (now resolved)  -c/w topical triamcinolone BID though pt reports sometimes forgetting. Adherence encouraged  -c/w ursodiol for pruritus      #Transaminitis  -Cholestatic pattern  -Suspect drug induced- abx vs. statin. R/O structural etiology  -Imaging w/ no biliary dilation but w/ RUQ pain on exam  -MRCP wnl  -Statin held   -F/u serologies      DVT: HSQ  DIET: Regular  DISPO: Home.
82 year old F with T2DM, hypothyroidism, HTN, HLD, recent UTI s/p course of keflex 5 days prior, presents for 3 days of diffuse pruritus without obvious rash. Patient incidentally found to have transaminitis consistent with mixed hepatocellular/cholestatic pattern of injury, along with acute renal injury.  Will follow up Derm recs  F/w hepatitis panel, US /CT- hepatic steatosis   Avoid nephrotoxics , RACEHL resolving   Correct electrolytes PRN  Symptomatic treatment
82 year old F with T2DM, hypothyroidism, HTN, HLD, recent UTI s/p course of keflex 5 days prior, presents for 3 days of diffuse pruritus without obvious rash, found to have eosinophilia, transaminitis and RACHEL.    #Pruritus  -Overall improving  -Initially c/f DIHS, though less likely per derm given lack of primary rash  -Also w/ transaminitis (see below) and RACHEL (now resolved)  -c/w topical triamcinolone BID   -Started on ursodiol for pruritus      #Transaminitis  -Cholestatic pattern  -Suspect drug induced- abx vs. statin. R/O structural etiology  -Imaging w/ no biliary dilation but w/ RUQ pain on exam  -Obtain MRCP  -Statin held   -Obtain AI serologies: AMA, Alpha 1 anti-trypsin, ceruloplasmin, AFP, anti-liver kidney antibody, IgG subset      DVT: HSQ  DIET: Regular  DISPO: Home.
82 year old F with T2DM, hypothyroidism, HTN, HLD, recent UTI s/p course of keflex 5 days prior, presents for 3 days of diffuse pruritus without obvious rash, found to have eosinophilia, transaminitis and RACHEL.    #Pruritus  -Overall improving  -Initially c/f DIHS, though less likely per derm given lack of primary rash  -Also w/ transaminitis (see below) and RACHEL (now resolved)  -c/w topical triamcinolone BID   -Appreciate derm recs    #Transaminitis  -Cholestatic pattern  -Suspect drug induced- abx vs. statin. R/O structural etiology  -Imaging w/ no biliary dilation but w/ RUQ pain on exam  -Obtain MRCP  -Statin held   -Obtain AI serologies: AMA, Alpha 1 anti-trypsin, ceruloplasmin, AFP, anti-liver kidney antibody, IgG subset      DVT: HSQ  DIET: Regular  DISPO: Home
82 year old F with T2DM, hypothyroidism, HTN, HLD, recent UTI s/p course of keflex 5 days prior, presents for 3 days of diffuse pruritus without obvious rash. Patient incidentally found to have transaminitis consistent with mixed hepatocellular/cholestatic pattern of injury, along with acute renal injury.  Patient noted with anion gap acidosis . check VBG, Urine/serum osmol   f/w derm recs   IVF PRN  F/w labs, correct electrolytes PRN
82 year old F with T2DM, hypothyroidism, HTN, HLD, recent UTI s/p course of keflex 5 days prior, presents for 3 days of diffuse pruritus without obvious rash. Patient incidentally found to have transaminitis consistent with mixed hepatocellular/cholestatic pattern of injury, along with acute renal injury.  F/w labs today not improving , Hepatology consult   Pruritus severe , bili , AP elevated   Dry skin   apply moisturizer , d/w RN  F/w LFT ,Hepatitis panel, autoimmune, PBS panel   F/w labs daily, RACHEL resolved   DVT px

## 2023-06-01 NOTE — PROGRESS NOTE ADULT - PROVIDER SPECIALTY LIST ADULT
Dermatology
Internal Medicine

## 2023-06-01 NOTE — DISCHARGE NOTE NURSING/CASE MANAGEMENT/SOCIAL WORK - NSDCPEFALRISK_GEN_ALL_CORE
For information on Fall & Injury Prevention, visit: [email or web address] OR  [email or web address] OR  https://www.cdc.gov/steadi/patient.html

## 2023-06-01 NOTE — DISCHARGE NOTE PROVIDER - CARE PROVIDER_API CALL
Adelita Gilmore  [facility]  [address] [place] [address]  Phone: [phone]  Fax: [phone]  Established Patient  Follow Up Time: 1 week    Jakub Pacheco  [facility]  [address] [place] [id number]  Phone: [phone]  Fax: [phone]  Follow Up Time: 2 weeks

## 2023-06-01 NOTE — DISCHARGE NOTE PROVIDER - PROVIDER TOKENS
PROVIDER:[TOKEN:[[id number]],FOLLOWUP:[1 week],ESTABLISHEDPATIENT:[T]],PROVIDER:[TOKEN:[[id number]],FOLLOWUP:[2 weeks]]

## 2023-06-02 LAB
HSV1 AB FLD QL: NEGATIVE — SIGNIFICANT CHANGE UP
HSV2 AB FLD-ACNC: NEGATIVE — SIGNIFICANT CHANGE UP

## 2023-06-05 LAB
IGG SERPL-MCNC: 921 MG/DL — SIGNIFICANT CHANGE UP (ref 586–1602)
IGG1 SER-MCNC: 587 MG/DL — SIGNIFICANT CHANGE UP (ref 248–810)
IGG2 SER-MCNC: 239 MG/DL — SIGNIFICANT CHANGE UP (ref 130–555)
IGG3 SER-MCNC: 18 MG/DL — SIGNIFICANT CHANGE UP (ref 15–102)
IGG4 SER-MCNC: 15 MG/DL — SIGNIFICANT CHANGE UP (ref 2–96)

## 2023-06-05 RX ORDER — URSODIOL 250 MG/1
1 TABLET, FILM COATED ORAL
Qty: 56 | Refills: 0
Start: 2023-06-05 | End: 2023-07-02

## 2023-06-20 ENCOUNTER — APPOINTMENT (OUTPATIENT)
Dept: HEPATOLOGY | Facility: CLINIC | Age: 83
End: 2023-06-20
Payer: MEDICARE

## 2023-06-20 VITALS
BODY MASS INDEX: 33.06 KG/M2 | OXYGEN SATURATION: 96 % | SYSTOLIC BLOOD PRESSURE: 147 MMHG | HEIGHT: 59 IN | HEART RATE: 84 BPM | DIASTOLIC BLOOD PRESSURE: 78 MMHG | TEMPERATURE: 98 F | WEIGHT: 164 LBS

## 2023-06-20 DIAGNOSIS — K21.9 GASTRO-ESOPHAGEAL REFLUX DISEASE W/OUT ESOPHAGITIS: ICD-10-CM

## 2023-06-20 DIAGNOSIS — G89.29 LOW BACK PAIN, UNSPECIFIED: ICD-10-CM

## 2023-06-20 DIAGNOSIS — I10 ESSENTIAL (PRIMARY) HYPERTENSION: ICD-10-CM

## 2023-06-20 DIAGNOSIS — G62.9 POLYNEUROPATHY, UNSPECIFIED: ICD-10-CM

## 2023-06-20 DIAGNOSIS — K59.09 OTHER CONSTIPATION: ICD-10-CM

## 2023-06-20 DIAGNOSIS — E66.9 OBESITY, UNSPECIFIED: ICD-10-CM

## 2023-06-20 DIAGNOSIS — R74.8 ABNORMAL LEVELS OF OTHER SERUM ENZYMES: ICD-10-CM

## 2023-06-20 DIAGNOSIS — K75.89 OTHER SPECIFIED INFLAMMATORY LIVER DISEASES: ICD-10-CM

## 2023-06-20 DIAGNOSIS — Z86.69 PERSONAL HISTORY OF OTHER DISEASES OF THE NERVOUS SYSTEM AND SENSE ORGANS: ICD-10-CM

## 2023-06-20 DIAGNOSIS — L29.9 PRURITUS, UNSPECIFIED: ICD-10-CM

## 2023-06-20 DIAGNOSIS — M54.50 LOW BACK PAIN, UNSPECIFIED: ICD-10-CM

## 2023-06-20 DIAGNOSIS — K71.9 TOXIC LIVER DISEASE, UNSPECIFIED: ICD-10-CM

## 2023-06-20 DIAGNOSIS — E03.9 HYPOTHYROIDISM, UNSPECIFIED: ICD-10-CM

## 2023-06-20 LAB
APTT BLD: 34.4 SEC
INR PPP: 0.95 RATIO
PT BLD: 11.2 SEC

## 2023-06-20 PROCEDURE — 99205 OFFICE O/P NEW HI 60 MIN: CPT

## 2023-06-20 RX ORDER — PANTOPRAZOLE SODIUM 40 MG/1
40 TABLET, DELAYED RELEASE ORAL
Refills: 0 | Status: ACTIVE | COMMUNITY

## 2023-06-20 RX ORDER — AMLODIPINE BESYLATE 2.5 MG/1
2.5 TABLET ORAL DAILY
Refills: 0 | Status: ACTIVE | COMMUNITY

## 2023-06-20 RX ORDER — TRIAMCINOLONE ACETONIDE 1 MG/G
0.1 OINTMENT TOPICAL
Refills: 0 | Status: ACTIVE | COMMUNITY

## 2023-06-20 RX ORDER — TIMOLOL MALEATE 5 MG/ML
SOLUTION OPHTHALMIC
Refills: 0 | Status: ACTIVE | COMMUNITY

## 2023-06-20 RX ORDER — LEVOTHYROXINE SODIUM 0.07 MG/1
75 TABLET ORAL DAILY
Refills: 0 | Status: ACTIVE | COMMUNITY

## 2023-06-20 RX ORDER — DICLOFENAC SODIUM 1% 10 MG/G
1 GEL TOPICAL
Refills: 0 | Status: ACTIVE | COMMUNITY

## 2023-06-20 RX ORDER — LORATADINE 10 MG
17 TABLET,DISINTEGRATING ORAL
Refills: 0 | Status: ACTIVE | COMMUNITY

## 2023-06-20 RX ORDER — GABAPENTIN 100 MG
100 TABLET ORAL AT BEDTIME
Refills: 0 | Status: ACTIVE | COMMUNITY

## 2023-06-20 RX ORDER — LINACLOTIDE 72 UG/1
72 CAPSULE, GELATIN COATED ORAL
Refills: 0 | Status: ACTIVE | COMMUNITY

## 2023-06-20 RX ORDER — URSODIOL 500 MG/1
500 TABLET ORAL TWICE DAILY
Qty: 60 | Refills: 2 | Status: ACTIVE | COMMUNITY

## 2023-06-20 RX ORDER — SENNOSIDES 8.6 MG TABLETS 8.6 MG/1
TABLET ORAL
Refills: 0 | Status: ACTIVE | COMMUNITY

## 2023-06-20 RX ORDER — LOSARTAN POTASSIUM 100 MG/1
100 TABLET, FILM COATED ORAL DAILY
Refills: 0 | Status: ACTIVE | COMMUNITY

## 2023-06-20 NOTE — PHYSICAL EXAM
[Non-Tender] : non-tender [General Appearance - Alert] : alert [General Appearance - In No Acute Distress] : in no acute distress [General Appearance - Well Nourished] : well nourished [General Appearance - Well Developed] : well developed [Sclera] : the sclera and conjunctiva were normal [Oropharynx] : the oropharynx was normal [Neck Appearance] : the appearance of the neck was normal [Jugular Venous Distention Increased] : there was no jugular-venous distention [] : no respiratory distress [Respiration, Rhythm And Depth] : normal respiratory rhythm and effort [Exaggerated Use Of Accessory Muscles For Inspiration] : no accessory muscle use [Auscultation Breath Sounds / Voice Sounds] : lungs were clear to auscultation bilaterally [Heart Rate And Rhythm] : heart rate was normal and rhythm regular [Obese] : obese [Normal] : normal [Soft, Nontender] : the abdomen was soft and nontender [None] : no CVA tenderness [Cervical Lymph Nodes Enlarged Posterior Bilaterally] : posterior cervical [Cervical Lymph Nodes Enlarged Anterior Bilaterally] : anterior cervical [Supraclavicular Lymph Nodes Enlarged Bilaterally] : supraclavicular [Axillary Lymph Nodes Enlarged Bilaterally] : axillary [Femoral Lymph Nodes Enlarged Bilaterally] : femoral [Inguinal Lymph Nodes Enlarged Bilaterally] : inguinal [No CVA Tenderness] : no ~M costovertebral angle tenderness [No Spinal Tenderness] : no spinal tenderness [Abnormal Walk] : normal gait [Skin Color & Pigmentation] : normal skin color and pigmentation [Oriented To Time, Place, And Person] : oriented to person, place, and time [Impaired Insight] : insight and judgment were intact [Affect] : the affect was normal [Mood] : the mood was normal [Scleral Icterus] : No Scleral Icterus [Spider Angioma] : No spider angioma(s) were observed [Abdominal  Ascites] : no ascites [Liver Palpable ___ Finger Breadths Below Costal Margin] : was not palpable below costal margin [Asterixis] : no asterixis observed [Jaundice] : No jaundice [Palmar Erythema] : no Palmar Erythema [Depression] : no depression [FreeTextEntry4] : Limited exam due to obesity [Dilated Collat. Veins] : no collateral vein dilation [Firm] : not firm [Rigid] : not rigid [Rebound] : no rebound [Guarding] : no guarding [Liver Tender To Palpation] : not tender [FreeTextEntry1] : Grossly intact

## 2023-06-20 NOTE — REASON FOR VISIT
[Other: _____] : [unfilled] [Source: ______] : History obtained from [unfilled] [Initial Evaluation] : an initial evaluation [FreeTextEntry1] : Elevated liver enzymes, suspected DILI

## 2023-06-20 NOTE — ASSESSMENT
[FreeTextEntry1] : 83 yrs old obese (BMI 33) Female with Hx of controlled Type2 DM (HbA1c 5.7, was on metformin in past), hypothyroidism (on Levothyroxine 75 mcg), HTN (on amlodipine and losartan), HLD, neuropathy (was on gabapentin), GERD, Hx of diverticulitis, constipation, Hx of UTI (completed Keflex on 5/22/23) was hospitalized at [facility] 5/26-6/1/23 when p/w about a week pruritus w/o any other symptoms, w/o rash, in setting of recent Keflex and collagen supplement (started about a month prior to admission).  MRCP w/wo contrast 5/31/23 showed no obstruction, RUQ US 5/26/23 showed mild hepatic steatosis. \par \par # Pruritus w/o rash\par # Abnormal liver enzymes, mixed pattern - > cholestatic pattern, improving\par # Hyperbilirubinemia, improved (peak 4.2 - > 1.5)\par # Suspected DILI \par # Pos FABIANO\par - Suspected DILI, due to collagen supplement +/- Keflex +/- herbal teas\par - CLD w/o showed pos FABIANO 1"8-, with normal IgG subsets. Will get Ig panel and sp100/gp210 to assess for AMA neg PBC, although presentation was rather acute\par - Will get repeat LFTs\par - C/w Ursodiol for now, sent refill\par - C/w triamcinolone\par - Patient discontinued all her medications, including amlodipine, losartan and levothyroxine. Advised on importance of mgmt. of her chronic diseases, including HTN (BP above target), hypothyroidism etc. \par - Advised to continue avoiding atorvastatin and f/u w/ PCP. Once liver enzymes normalized and patient needs cholesterol medication, avoid rechallenge w/ atorvastatin and consider alternative. If other statin chosen, needs close monitoring.\par - Avoid OTC supplements, including the collagen one. Avoid herbal teas.\par \par # Obesity\par - Discussed life style modifications.\par \par # GERD\par - Discussed antireflux diet\par \par # Constipation\par - Advised to see GI\par \par RTC 2 weeks , further w/o depends on trend\par \par \par  \par  \par

## 2023-06-20 NOTE — HISTORY OF PRESENT ILLNESS
[FreeTextEntry1] : 83 yrs old obese (BMI 33) Female with Hx of controlled Type2 DM (HbA1c 5.7, was on metformin in past), hypothyroidism (on Levothyroxine 75 mcg), HTN (on amlodipine and losartan), HLD, neuropathy (was on gabapentin), GERD, Hx of diverticulitis, constipation, Hx of UTI (completed Keflex on 5/22/23) was hospitalized at [facility] 5/26-6/1/23 when p/w about a week pruritus w/o any other symptoms, w/o rash, in setting of recent Keflex and collagen supplement use (started about a month prior to admission).  She also c/o vaginal itching and burning, but reportedly had negative Gyn exam. \par MRCP w/wo contrast 5/31/23: Normal size and morphology liver, no focal liver lesions, punctate liver calcification, normal bile ducts, no choledocholithiasis, s/p cholecystectomy , no ascites, patent portal veins.  \par RUQ US 5/26/23 showed mild hepatic steatosis. \par Labs 6/1/23 WBC 7.19, Hb 12.0, .\par Liver tests (5/26 vs 6/1/23): -->146; -->180; -->279; Se bilirubin 4.2-->1.5; Albumin 3.9, INR 1.04. \par Liver workup: Hep A IgM, HBsAg neg, HBcAb IgM neg, FABIANO 1:80 pos (centromere), AMA neg, SMA neg, LKM neg, EA past infection (but EA is pos too), parvovirus neg, HSV 1/2 IgM neg, CMV PCR neg, HIV neg,  A1AT 198, ceruloplasmin 31, IgG subsets normal, CPK WNL. \par \par She was started on Ursodiol and her atorvastatin and collagen supplement was discontinued.\par In hospital she was on amlodipine, gabapentin, pantoprazole, melatonin, levothyroxine, timolol, heparin DVT ppx, triamcinolone. \par \par She is here for initial post hospital discharge follow up. \par \par Discharge medications per d/c summary from 6/1/23: Amlodipine 2.5 mg, Losartan 100 mg, Levothyroxine 75 mcg, Gabapentin 100 mg HS,  Pantoprazole 40 mg, Linzess  daily, Miralax bid, senna 2 tbl HS, vit B12 1000 mcg, diclofenac gel, timolol 0.5%, triamcinolone 0.1% topical ointment,  and Ursodiol 500 mg bid.\par \par However, she reported she stopped all her medications after hospital discharge. \par \par She was seen by Dr. Guanaco Cotter (GI/hepatology) several years ago, for RUQ pain. She had EGD and colonoscopy. \par \par \par \par \par \par

## 2023-06-20 NOTE — REVIEW OF SYSTEMS
[Constipation] : constipation [Itching] : itching [Negative] : Heme/Lymph [Fever] : no fever [Chills] : no chills [Dry Eyes] : no dryness of the eyes [Chest Pain] : no chest pain [Palpitations] : no palpitations [Shortness Of Breath] : no shortness of breath [Wheezing] : no wheezing [Cough] : no cough [SOB on Exertion] : no shortness of breath during exertion [Abdominal Pain] : no abdominal pain [Vomiting] : no vomiting [Diarrhea] : no diarrhea [Heartburn] : no heartburn [Melena] : no melena [Dysuria] : no dysuria [Confused] : no confusion [FreeTextEntry7] : No nausea, no hematochezia.  [FreeTextEntry8] : Vaginal itching [FreeTextEntry9] : Lower back pain, chronic [de-identified] : Improved by cold shower

## 2023-06-21 LAB
ALBUMIN SERPL ELPH-MCNC: 4.5 G/DL
ALP BLD-CCNC: 135 U/L
ALT SERPL-CCNC: 21 U/L
ANION GAP SERPL CALC-SCNC: 19 MMOL/L
AST SERPL-CCNC: 25 U/L
BILIRUB DIRECT SERPL-MCNC: 0.3 MG/DL
BILIRUB INDIRECT SERPL-MCNC: 0.5 MG/DL
BILIRUB SERPL-MCNC: 0.7 MG/DL
BUN SERPL-MCNC: 9 MG/DL
CALCIUM SERPL-MCNC: 9.5 MG/DL
CHLORIDE SERPL-SCNC: 105 MMOL/L
CO2 SERPL-SCNC: 19 MMOL/L
CREAT SERPL-MCNC: 0.95 MG/DL
EGFR: 59 ML/MIN/1.73M2
FERRITIN SERPL-MCNC: 38 NG/ML
GLUCOSE SERPL-MCNC: 127 MG/DL
HBV CORE IGG+IGM SER QL: NONREACTIVE
HBV SURFACE AB SER QL: NONREACTIVE
HBV SURFACE AG SER QL: NONREACTIVE
HEPATITIS A IGG ANTIBODY: REACTIVE
IRON SATN MFR SERPL: 15 %
IRON SERPL-MCNC: 51 UG/DL
POTASSIUM SERPL-SCNC: 4.7 MMOL/L
PROT SERPL-MCNC: 6.9 G/DL
SODIUM SERPL-SCNC: 142 MMOL/L
TIBC SERPL-MCNC: 352 UG/DL
UIBC SERPL-MCNC: 301 UG/DL

## 2023-06-21 NOTE — PHYSICAL THERAPY INITIAL EVALUATION ADULT - IMPAIRMENTS FOUND, PT EVAL
"Physical Therapy Treatment    Patient Name:  Carine Darden   MRN:  [id number]    Recommendations:     Discharge Recommendations: rehabilitation facility, nursing facility, skilled  Discharge Equipment Recommendations:  (TBD at next level of care)  Barriers to discharge: Decreased caregiver support    Assessment:     Carine Darden is a 53 y.o. female admitted with a medical diagnosis of Critical limb ischemia of right lower extremity.  She presents with the following impairments/functional limitations: weakness, impaired endurance, impaired functional mobility, gait instability, impaired balance, decreased lower extremity function, pain, edema, impaired skin, orthopedic precautions.    Pt found HOB elevated and agreeable to PT session. Pt expecting to discharge to SNF this afternoon. Pt tolerated session fairly well and is very motivated to regain her independence with mobility.     Rehab Prognosis: Good and Fair; patient would benefit from acute skilled PT services to address these deficits and reach maximum level of function.    Recent Surgery: Procedure(s) (LRB):  AMPUTATION, ABOVE KNEE (Right) 5 Days Post-Op    Plan:     During this hospitalization, patient to be seen daily to address the identified rehab impairments via gait training, therapeutic activities, therapeutic exercises and progress toward the following goals:    Plan of Care Expires:  07/17/23    Subjective     Chief Complaint: pain R LE  Patient/Family Comments/goals: SNF  Pain/Comfort:  Pain Rating 1:  ("12")  Location - Side 1: Right  Location 1: leg  Pain Addressed 1: Reposition, Distraction, Cessation of Activity, Nurse notified      Objective:     Communicated with MAYI Villa prior to and during session.  Patient found HOB elevated with bed alarm, peripheral IV, telemetry upon PT entry to room.     General Precautions: Standard, fall  Orthopedic Precautions: RLE non weight bearing  Braces: N/A  Respiratory Status: Room air     Functional Mobility:  Bed " Mobility:     Scooting: stand by assistance and contact guard assistance  Supine to Sit: stand by assistance and contact guard assistance  Sit to Supine: stand by assistance and contact guard assistance  Transfers:     Sit to Stand:  minimum assistance and vc for technique with rolling walker  Bed to Chair: minimum assistance with  rolling walker  using  Step Transfer; pt sat in chair after gait training and then decided to get into bed as transport to SNF was not coming for a few hours.  Gait: x 50 feet with RW and minimal A for balance.      AM-PAC 6 CLICK MOBILITY          Treatment & Education:  Pt was educated on the following: call light use, importance of OOB activity and functional mobility to negate the negative effects of prolonged bed rest during this hospitalization, safe transfers/ambulation and discharge planning recommendations/options.      Patient left HOB elevated with all lines intact, call button in reach, bed alarm on, RN notified, and pt's spouse present..    GOALS:   Multidisciplinary Problems       Physical Therapy Goals          Problem: Physical Therapy    Goal Priority Disciplines Outcome Goal Variances Interventions   Physical Therapy Goal     PT, PT/OT Ongoing, Progressing     Description: Goals to be met by: discharge     Patient will increase functional independence with mobility by performin. Patient will modified independent with bed mobility.  2. Patient will be modified independent with transfers.  3. Patient will ambulate 50 ft with RW  and supervision.                         Time Tracking:     PT Received On: 23  PT Start Time: 1355     PT Stop Time: 1410  PT Total Time (min): 15 min     Billable Minutes: Gait Training 15    Treatment Type: Treatment  PT/PTA: PT           2023   gait, locomotion, and balance

## 2023-06-22 DIAGNOSIS — R76.8 OTHER SPECIFIED ABNORMAL IMMUNOLOGICAL FINDINGS IN SERUM: ICD-10-CM

## 2023-06-22 LAB
DEPRECATED KAPPA LC FREE/LAMBDA SER: 1.8 RATIO
IGA SER QL IEP: 247 MG/DL
IGG SER QL IEP: 837 MG/DL
IGM SER QL IEP: 171 MG/DL
KAPPA LC CSF-MCNC: 1.84 MG/DL
KAPPA LC SERPL-MCNC: 3.32 MG/DL

## 2023-06-23 LAB
ANTI - GP210 ANTIBODY, IGG: 0.6 UNITS
ANTI - SP100 ANTIBODY, IGG: 1.3 UNITS

## 2023-06-24 LAB
ANNOTATION COMMENT IMP: NOT DETECTED
BILEACIDS T: 4.8 UMOL/L
CHENDEOXYCHOLIC ACID: 3.2 UMOL/L
CHOLIC ACID: 0.4 UMOL/L
DEOXYCHOLIC ACID: 1.2 UMOL/L
HEPATITIS E IGM ABY: NEGATIVE
HEPATITIS E QUANT BY PCR, IU/ML: NORMAL IU/ML
HEPATITIS E QUANT BY PCR, LOG IU/ML: <3.3 LOG IU/ML
HEPATITIS E QUANT BY PCR, SOURCE: NORMAL
HEV AB SER QL: NEGATIVE
URSODEOXYCH: <0.1 UMOL/L

## 2023-07-11 ENCOUNTER — APPOINTMENT (OUTPATIENT)
Dept: HEPATOLOGY | Facility: CLINIC | Age: 83
End: 2023-07-11

## 2023-08-14 ENCOUNTER — APPOINTMENT (OUTPATIENT)
Dept: HEPATOLOGY | Facility: CLINIC | Age: 83
End: 2023-08-14

## 2024-02-16 NOTE — PATIENT PROFILE ADULT. - MEDICATION ADMINISTRATION INFO, PROFILE
Ilumya Pregnancy And Lactation Text: The risk during pregnancy and breastfeeding is uncertain with this medication. Winlevi Pregnancy And Lactation Text: This medication is considered safe during pregnancy and breastfeeding. Prednisone Counseling:  I discussed with the patient the risks of prolonged use of prednisone including but not limited to weight gain, insomnia, osteoporosis, mood changes, diabetes, susceptibility to infection, glaucoma and high blood pressure.  In cases where prednisone use is prolonged, patients should be monitored with blood pressure checks, serum glucose levels and an eye exam.  Additionally, the patient may need to be placed on GI prophylaxis, PCP prophylaxis, and calcium and vitamin D supplementation and/or a bisphosphonate.  The patient verbalized understanding of the proper use and the possible adverse effects of prednisone.  All of the patient's questions and concerns were addressed. Valtrex Counseling: I discussed with the patient the risks of valacyclovir including but not limited to kidney damage, nausea, vomiting and severe allergy.  The patient understands that if the infection seems to be worsening or is not improving, they are to call. Ivermectin Pregnancy And Lactation Text: This medication is Pregnancy Category C and it isn't known if it is safe during pregnancy. It is also excreted in breast milk. Skyrizi Counseling: I discussed with the patient the risks of risankizumab-rzaa including but not limited to immunosuppression, and serious infections.  The patient understands that monitoring is required including a PPD at baseline and must alert us or the primary physician if symptoms of infection or other concerning signs are noted. Elidel Pregnancy And Lactation Text: This medication is Pregnancy Category C. It is unknown if this medication is excreted in breast milk. Cimetidine Counseling:  I discussed with the patient the risks of Cimetidine including but not limited to gynecomastia, headache, diarrhea, nausea, drowsiness, arrhythmias, pancreatitis, skin rashes, psychosis, bone marrow suppression and kidney toxicity. Minoxidil Counseling: Minoxidil is a topical medication which can increase blood flow where it is applied. It is uncertain how this medication increases hair growth. Side effects are uncommon and include stinging and allergic reactions. Xolair Pregnancy And Lactation Text: This medication is Pregnancy Category B and is considered safe during pregnancy. This medication is excreted in breast milk. Azathioprine Counseling:  I discussed with the patient the risks of azathioprine including but not limited to myelosuppression, immunosuppression, hepatotoxicity, lymphoma, and infections.  The patient understands that monitoring is required including baseline LFTs, Creatinine, possible TPMP genotyping and weekly CBCs for the first month and then every 2 weeks thereafter.  The patient verbalized understanding of the proper use and possible adverse effects of azathioprine.  All of the patient's questions and concerns were addressed. Gabapentin Pregnancy And Lactation Text: This medication is Pregnancy Category C and isn't considered safe during pregnancy. It is excreted in breast milk. Arava Counseling:  Patient counseled regarding adverse effects of Arava including but not limited to nausea, vomiting, abnormalities in liver function tests. Patients may develop mouth sores, rash, diarrhea, and abnormalities in blood counts. The patient understands that monitoring is required including LFTs and blood counts.  There is a rare possibility of scarring of the liver and lung problems that can occur when taking methotrexate. Persistent nausea, loss of appetite, pale stools, dark urine, cough, and shortness of breath should be reported immediately. Patient advised to discontinue Arava treatment and consult with a physician prior to attempting conception. The patient will have to undergo a treatment to eliminate Arava from the body prior to conception. Azathioprine Pregnancy And Lactation Text: This medication is Pregnancy Category D and isn't considered safe during pregnancy. It is unknown if this medication is excreted in breast milk. Minoxidil Pregnancy And Lactation Text: This medication has not been assigned a Pregnancy Risk Category but animal studies failed to show danger with the topical medication. It is unknown if the medication is excreted in breast milk. Cimetidine Pregnancy And Lactation Text: This medication is Pregnancy Category B and is considered safe during pregnancy. It is also excreted in breast milk and breast feeding isn't recommended. Rinvoq Pregnancy And Lactation Text: Based on animal studies, Rinvoq may cause embryo-fetal harm when administered to pregnant women.  The medication should not be used in pregnancy.  Breastfeeding is not recommended during treatment and for 6 days after the last dose. Nsaids Counseling: NSAID Counseling: I discussed with the patient that NSAIDs should be taken with food. Prolonged use of NSAIDs can result in the development of stomach ulcers.  Patient advised to stop taking NSAIDs if abdominal pain occurs.  The patient verbalized understanding of the proper use and possible adverse effects of NSAIDs.  All of the patient's questions and concerns were addressed. Qbrexza Counseling:  I discussed with the patient the risks of Qbrexza including but not limited to headache, mydriasis, blurred vision, dry eyes, nasal dryness, dry mouth, dry throat, dry skin, urinary hesitation, and constipation.  Local skin reactions including erythema, burning, stinging, and itching can also occur. High Dose Vitamin A Counseling: Side effects reviewed, pt to contact office should one occur. Oxybutynin Pregnancy And Lactation Text: This medication is Pregnancy Category B and is considered safe during pregnancy. It is unknown if it is excreted in breast milk. Doxycycline Counseling:  Patient counseled regarding possible photosensitivity and increased risk for sunburn.  Patient instructed to avoid sunlight, if possible.  When exposed to sunlight, patients should wear protective clothing, sunglasses, and sunscreen.  The patient was instructed to call the office immediately if the following severe adverse effects occur:  hearing changes, easy bruising/bleeding, severe headache, or vision changes.  The patient verbalized understanding of the proper use and possible adverse effects of doxycycline.  All of the patient's questions and concerns were addressed. Dutasteride Pregnancy And Lactation Text: This medication is absolutely contraindicated in women, especially during pregnancy and breast feeding. Feminization of male fetuses is possible if taking while pregnant. no concerns Carac Counseling:  I discussed with the patient the risks of Carac including but not limited to erythema, scaling, itching, weeping, crusting, and pain. Quinolones Pregnancy And Lactation Text: This medication is Pregnancy Category C and it isn't know if it is safe during pregnancy. It is also excreted in breast milk. Topical Metronidazole Counseling: Metronidazole is a topical antibiotic medication. You may experience burning, stinging, redness, or allergic reactions.  Please call our office if you develop any problems from using this medication. Griseofulvin Counseling:  I discussed with the patient the risks of griseofulvin including but not limited to photosensitivity, cytopenia, liver damage, nausea/vomiting and severe allergy.  The patient understands that this medication is best absorbed when taken with a fatty meal (e.g., ice cream or french fries). Sotyktu Pregnancy And Lactation Text: There is insufficient data to evaluate whether or not Sotyktu is safe to use during pregnancy.   It is not known if Sotyktu passes into breast milk and whether or not it is safe to use when breastfeeding.   Rifampin Counseling: I discussed with the patient the risks of rifampin including but not limited to liver damage, kidney damage, red-orange body fluids, nausea/vomiting and severe allergy. Topical Metronidazole Pregnancy And Lactation Text: This medication is Pregnancy Category B and considered safe during pregnancy.  It is also considered safe to use while breastfeeding. Xeljanz Counseling: I discussed with the patient the risks of Xeljanz therapy including increased risk of infection, liver issues, headache, diarrhea, or cold symptoms. Live vaccines should be avoided. They were instructed to call if they have any problems. Finasteride Male Counseling: Finasteride Counseling:  I discussed with the patient the risks of use of finasteride including but not limited to decreased libido, decreased ejaculate volume, gynecomastia, and depression. Women should not handle medication.  All of the patient's questions and concerns were addressed. Infliximab Counseling:  I discussed with the patient the risks of infliximab including but not limited to myelosuppression, immunosuppression, autoimmune hepatitis, demyelinating diseases, lymphoma, and serious infections.  The patient understands that monitoring is required including a PPD at baseline and must alert us or the primary physician if symptoms of infection or other concerning signs are noted. High Dose Vitamin A Pregnancy And Lactation Text: High dose vitamin A therapy is contraindicated during pregnancy and breast feeding. Erivedge Counseling- I discussed with the patient the risks of Erivedge including but not limited to nausea, vomiting, diarrhea, constipation, weight loss, changes in the sense of taste, decreased appetite, muscle spasms, and hair loss.  The patient verbalized understanding of the proper use and possible adverse effects of Erivedge.  All of the patient's questions and concerns were addressed. VTAMA Counseling: I discussed with the patient that VTAMA is not for use in the eyes, mouth or mouth. They should call the office if they develop any signs of allergic reactions to VTAMA. The patient verbalized understanding of the proper use and possible adverse effects of VTAMA.  All of the patient's questions and concerns were addressed. Carac Pregnancy And Lactation Text: This medication is Pregnancy Category X and contraindicated in pregnancy and in women who may become pregnant. It is unknown if this medication is excreted in breast milk. Tazorac Counseling:  Patient advised that medication is irritating and drying.  Patient may need to apply sparingly and wash off after an hour before eventually leaving it on overnight.  The patient verbalized understanding of the proper use and possible adverse effects of tazorac.  All of the patient's questions and concerns were addressed. Valtrex Pregnancy And Lactation Text: this medication is Pregnancy Category B and is considered safe during pregnancy. This medication is not directly found in breast milk but it's metabolite acyclovir is present. Eucrisa Counseling: Patient may experience a mild burning sensation during topical application. Eucrisa is not approved in children less than 3 months of age. Stelara Counseling:  I discussed with the patient the risks of ustekinumab including but not limited to immunosuppression, malignancy, posterior leukoencephalopathy syndrome, and serious infections.  The patient understands that monitoring is required including a PPD at baseline and must alert us or the primary physician if symptoms of infection or other concerning signs are noted. Arava Pregnancy And Lactation Text: This medication is Pregnancy Category X and is absolutely contraindicated during pregnancy. It is unknown if it is excreted in breast milk. Mirvaso Counseling: Mirvaso is a topical medication which can decrease superficial blood flow where applied. Side effects are uncommon and include stinging, redness and allergic reactions. Glycopyrrolate Counseling:  I discussed with the patient the risks of glycopyrrolate including but not limited to skin rash, drowsiness, dry mouth, difficulty urinating, and blurred vision. Nsaids Pregnancy And Lactation Text: These medications are considered safe up to 30 weeks gestation. It is excreted in breast milk. Griseofulvin Pregnancy And Lactation Text: This medication is Pregnancy Category X and is known to cause serious birth defects. It is unknown if this medication is excreted in breast milk but breast feeding should be avoided. Azithromycin Counseling:  I discussed with the patient the risks of azithromycin including but not limited to GI upset, allergic reaction, drug rash, diarrhea, and yeast infections. Propranolol Counseling:  I discussed with the patient the risks of propranolol including but not limited to low heart rate, low blood pressure, low blood sugar, restlessness and increased cold sensitivity. They should call the office if they experience any of these side effects. Cellcept Counseling:  I discussed with the patient the risks of mycophenolate mofetil including but not limited to infection/immunosuppression, GI upset, hypokalemia, hypercholesterolemia, bone marrow suppression, lymphoproliferative disorders, malignancy, GI ulceration/bleed/perforation, colitis, interstitial lung disease, kidney failure, progressive multifocal leukoencephalopathy, and birth defects.  The patient understands that monitoring is required including a baseline creatinine and regular CBC testing. In addition, patient must alert us immediately if symptoms of infection or other concerning signs are noted. Doxepin Counseling:  Patient advised that the medication is sedating and not to drive a car after taking this medication. Patient informed of potential adverse effects including but not limited to dry mouth, urinary retention, and blurry vision.  The patient verbalized understanding of the proper use and possible adverse effects of doxepin.  All of the patient's questions and concerns were addressed. Adbry Counseling: I discussed with the patient the risks of tralokinumab including but not limited to eye infection and irritation, cold sores, injection site reactions, worsening of asthma, allergic reactions and increased risk of parasitic infection.  Live vaccines should be avoided while taking tralokinumab. The patient understands that monitoring is required and they must alert us or the primary physician if symptoms of infection or other concerning signs are noted. Qbrexza Pregnancy And Lactation Text: There is no available data on Qbrexza use in pregnant women.  There is no available data on Qbrexza use in lactation. Doxycycline Pregnancy And Lactation Text: This medication is Pregnancy Category D and not consider safe during pregnancy. It is also excreted in breast milk but is considered safe for shorter treatment courses. Prednisone Pregnancy And Lactation Text: This medication is Pregnancy Category C and it isn't know if it is safe during pregnancy. This medication is excreted in breast milk. Dupixent Counseling: I discussed with the patient the risks of dupilumab including but not limited to eye infection and irritation, cold sores, injection site reactions, worsening of asthma, allergic reactions and increased risk of parasitic infection.  Live vaccines should be avoided while taking dupilumab. Dupilumab will also interact with certain medications such as warfarin and cyclosporine. The patient understands that monitoring is required and they must alert us or the primary physician if symptoms of infection or other concerning signs are noted. Finasteride Female Counseling: Finasteride Counseling:  I discussed with the patient the risks of use of finasteride including but not limited to decreased libido and sexual dysfunction. Explained the teratogenic nature of the medication and stressed the importance of not getting pregnant during treatment. All of the patient's questions and concerns were addressed. Propranolol Pregnancy And Lactation Text: This medication is Pregnancy Category C and it isn't known if it is safe during pregnancy. It is excreted in breast milk. Itraconazole Counseling:  I discussed with the patient the risks of itraconazole including but not limited to liver damage, nausea/vomiting, neuropathy, and severe allergy.  The patient understands that this medication is best absorbed when taken with acidic beverages such as non-diet cola or ginger ale.  The patient understands that monitoring is required including baseline LFTs and repeat LFTs at intervals.  The patient understands that they are to contact us or the primary physician if concerning signs are noted. Xelbillz Pregnancy And Lactation Text: This medication is Pregnancy Category D and is not considered safe during pregnancy.  The risk during breast feeding is also uncertain. Infliximab Pregnancy And Lactation Text: This medication is Pregnancy Category B and is considered safe during pregnancy. It is unknown if this medication is excreted in breast milk. Adbry Pregnancy And Lactation Text: It is unknown if this medication will adversely affect pregnancy or breast feeding. Rhofade Counseling: Rhofade is a topical medication which can decrease superficial blood flow where applied. Side effects are uncommon and include stinging, redness and allergic reactions. Erythromycin Counseling:  I discussed with the patient the risks of erythromycin including but not limited to GI upset, allergic reaction, drug rash, diarrhea, increase in liver enzymes, and yeast infections. Tazorac Pregnancy And Lactation Text: This medication is not safe during pregnancy. It is unknown if this medication is excreted in breast milk. Calcipotriene Counseling:  I discussed with the patient the risks of calcipotriene including but not limited to erythema, scaling, itching, and irritation. Rifampin Pregnancy And Lactation Text: This medication is Pregnancy Category C and it isn't know if it is safe during pregnancy. It is also excreted in breast milk and should not be used if you are breast feeding. Topical Steroids Counseling: I discussed with the patient that prolonged use of topical steroids can result in the increased appearance of superficial blood vessels (telangiectasias), lightening (hypopigmentation) and thinning of the skin (atrophy).  Patient understands to avoid using high potency steroids in skin folds, the groin or the face.  The patient verbalized understanding of the proper use and possible adverse effects of topical steroids.  All of the patient's questions and concerns were addressed. Use Enhanced Medication Counseling?: No Vtama Pregnancy And Lactation Text: It is unknown if this medication can cause problems during pregnancy and breastfeeding. Zoryve Counseling:  I discussed with the patient that Zoryve is not for use in the eyes, mouth or vagina. The most commonly reported side effects include diarrhea, headache, insomnia, application site pain, upper respiratory tract infections, and urinary tract infections.  All of the patient's questions and concerns were addressed. Hydroquinone Counseling:  Patient advised that medication may result in skin irritation, lightening (hypopigmentation), dryness, and burning.  In the event of skin irritation, the patient was advised to reduce the amount of the drug applied or use it less frequently.  Rarely, spots that are treated with hydroquinone can become darker (pseudoochronosis).  Should this occur, patient instructed to stop medication and call the office. The patient verbalized understanding of the proper use and possible adverse effects of hydroquinone.  All of the patient's questions and concerns were addressed. Clofazimine Counseling:  I discussed with the patient the risks of clofazimine including but not limited to skin and eye pigmentation, liver damage, nausea/vomiting, gastrointestinal bleeding and allergy. Cibinqo Counseling: I discussed with the patient the risks of Cibinqo therapy including but not limited to common cold, nausea, headache, cold sores, increased blood CPK levels, dizziness, UTIs, fatigue, acne, and vomitting. Live vaccines should be avoided.  This medication has been linked to serious infections; higher rate of mortality; malignancy and lymphoproliferative disorders; major adverse cardiovascular events; thrombosis; thrombocytopenia and lymphopenia; lipid elevations; and retinal detachment. Mirvaso Pregnancy And Lactation Text: This medication has not been assigned a Pregnancy Risk Category. It is unknown if the medication is excreted in breast milk. Azithromycin Pregnancy And Lactation Text: This medication is considered safe during pregnancy and is also secreted in breast milk. Olanzapine Counseling- I discussed with the patient the common side effects of olanzapine including but are not limited to: lack of energy, dry mouth, increased appetite, sleepiness, tremor, constipation, dizziness, changes in behavior, or restlessness.  Explained that teenagers are more likely to experience headaches, abdominal pain, pain in the arms or legs, tiredness, and sleepiness.  Serious side effects include but are not limited: increased risk of death in elderly patients who are confused, have memory loss, or dementia-related psychosis; hyperglycemia; increased cholesterol and triglycerides; and weight gain. Glycopyrrolate Pregnancy And Lactation Text: This medication is Pregnancy Category B and is considered safe during pregnancy. It is unknown if it is excreted breast milk. Doxepin Pregnancy And Lactation Text: This medication is Pregnancy Category C and it isn't known if it is safe during pregnancy. It is also excreted in breast milk and breast feeding isn't recommended. Calcipotriene Pregnancy And Lactation Text: The use of this medication during pregnancy or lactation is not recommended as there is insufficient data. Olanzapine Pregnancy And Lactation Text: This medication is pregnancy category C.   There are no adequate and well controlled trials with olanzapine in pregnant females.  Olanzapine should be used during pregnancy only if the potential benefit justifies the potential risk to the fetus.   In a study in lactating healthy women, olanzapine was excreted in breast milk.  It is recommended that women taking olanzapine should not breast feed. Cyclophosphamide Counseling:  I discussed with the patient the risks of cyclophosphamide including but not limited to hair loss, hormonal abnormalities, decreased fertility, abdominal pain, diarrhea, nausea and vomiting, bone marrow suppression and infection. The patient understands that monitoring is required while taking this medication. Dupixent Pregnancy And Lactation Text: This medication likely crosses the placenta but the risk for the fetus is uncertain. This medication is excreted in breast milk. Topical Steroids Applications Pregnancy And Lactation Text: Most topical steroids are considered safe to use during pregnancy and lactation.  Any topical steroid applied to the breast or nipple should be washed off before breastfeeding. Finasteride Pregnancy And Lactation Text: This medication is absolutely contraindicated during pregnancy. It is unknown if it is excreted in breast milk. SSKI Counseling:  I discussed with the patient the risks of SSKI including but not limited to thyroid abnormalities, metallic taste, GI upset, fever, headache, acne, arthralgias, paraesthesias, lymphadenopathy, easy bleeding, arrhythmias, and allergic reaction. Aklief counseling:  Patient advised to apply a pea-sized amount only at bedtime and wait 30 minutes after washing their face before applying.  If too drying, patient may add a non-comedogenic moisturizer.  The most commonly reported side effects including irritation, redness, scaling, dryness, stinging, burning, itching, and increased risk of sunburn.  The patient verbalized understanding of the proper use and possible adverse effects of retinoids.  All of the patient's questions and concerns were addressed. Erythromycin Pregnancy And Lactation Text: This medication is Pregnancy Category B and is considered safe during pregnancy. It is also excreted in breast milk. Cantharidin Counseling:  I discussed with the patient the risks of Cantharidin including but not limited to pain, redness, burning, itching, and blistering. Bimzelx Counseling:  I discussed with the patient the risks of Bimzelx including but not limited to depression, immunosuppression, allergic reactions and infections.  The patient understands that monitoring is required including a PPD at baseline and must alert us or the primary physician if symptoms of infection or other concerning signs are noted. Topical Clindamycin Counseling: Patient counseled that this medication may cause skin irritation or allergic reactions.  In the event of skin irritation, the patient was advised to reduce the amount of the drug applied or use it less frequently.   The patient verbalized understanding of the proper use and possible adverse effects of clindamycin.  All of the patient's questions and concerns were addressed. Sarecycline Counseling: Patient advised regarding possible photosensitivity and discoloration of the teeth, skin, lips, tongue and gums.  Patient instructed to avoid sunlight, if possible.  When exposed to sunlight, patients should wear protective clothing, sunglasses, and sunscreen.  The patient was instructed to call the office immediately if the following severe adverse effects occur:  hearing changes, easy bruising/bleeding, severe headache, or vision changes.  The patient verbalized understanding of the proper use and possible adverse effects of sarecycline.  All of the patient's questions and concerns were addressed. Libtayo Counseling- I discussed with the patient the risks of Libtayo including but not limited to nausea, vomiting, diarrhea, and bone or muscle pain.  The patient verbalized understanding of the proper use and possible adverse effects of Libtayo.  All of the patient's questions and concerns were addressed. Rituxan Counseling:  I discussed with the patient the risks of Rituxan infusions. Side effects can include infusion reactions, severe drug rashes including mucocutaneous reactions, reactivation of latent hepatitis and other infections and rarely progressive multifocal leukoencephalopathy.  All of the patient's questions and concerns were addressed. Rituxan Pregnancy And Lactation Text: This medication is Pregnancy Category C and it isn't know if it is safe during pregnancy. It is unknown if this medication is excreted in breast milk but similar antibodies are known to be excreted. Libtayo Pregnancy And Lactation Text: This medication is contraindicated in pregnancy and when breast feeding. Taltz Counseling: I discussed with the patient the risks of ixekizumab including but not limited to immunosuppression, serious infections, worsening of inflammatory bowel disease and drug reactions.  The patient understands that monitoring is required including a PPD at baseline and must alert us or the primary physician if symptoms of infection or other concerning signs are noted. Sarecycline Pregnancy And Lactation Text: This medication is Pregnancy Category D and not consider safe during pregnancy. It is also excreted in breast milk. Cibinqo Pregnancy And Lactation Text: It is unknown if this medication will adversely affect pregnancy or breast feeding.  You should not take this medication if you are currently pregnant or planning a pregnancy or while breastfeeding. Acitretin Counseling:  I discussed with the patient the risks of acitretin including but not limited to hair loss, dry lips/skin/eyes, liver damage, hyperlipidemia, depression/suicidal ideation, photosensitivity.  Serious rare side effects can include but are not limited to pancreatitis, pseudotumor cerebri, bony changes, clot formation/stroke/heart attack.  Patient understands that alcohol is contraindicated since it can result in liver toxicity and significantly prolong the elimination of the drug by many years. Hydroxyzine Counseling: Patient advised that the medication is sedating and not to drive a car after taking this medication.  Patient informed of potential adverse effects including but not limited to dry mouth, urinary retention, and blurry vision.  The patient verbalized understanding of the proper use and possible adverse effects of hydroxyzine.  All of the patient's questions and concerns were addressed. Bactrim Counseling:  I discussed with the patient the risks of sulfa antibiotics including but not limited to GI upset, allergic reaction, drug rash, diarrhea, dizziness, photosensitivity, and yeast infections.  Rarely, more serious reactions can occur including but not limited to aplastic anemia, agranulocytosis, methemoglobinemia, blood dyscrasias, liver or kidney failure, lung infiltrates or desquamative/blistering drug rashes. Opzelura Counseling:  I discussed with the patient the risks of Opzelura including but not limited to nasopharngitis, bronchitis, ear infection, eosinophila, hives, diarrhea, folliculitis, tonsillitis, and rhinorrhea.  Taken orally, this medication has been linked to serious infections; higher rate of mortality; malignancy and lymphoproliferative disorders; major adverse cardiovascular events; thrombosis; thrombocytopenia, anemia, and neutropenia; and lipid elevations. Hydroxychloroquine Counseling:  I discussed with the patient that a baseline ophthalmologic exam is needed at the start of therapy and every year thereafter while on therapy. A CBC may also be warranted for monitoring.  The side effects of this medication were discussed with the patient, including but not limited to agranulocytosis, aplastic anemia, seizures, rashes, retinopathy, and liver toxicity. Patient instructed to call the office should any adverse effect occur.  The patient verbalized understanding of the proper use and possible adverse effects of Plaquenil.  All the patient's questions and concerns were addressed. Cantharidin Pregnancy And Lactation Text: This medication has not been proven safe during pregnancy. It is unknown if this medication is excreted in breast milk. Hydroxychloroquine Pregnancy And Lactation Text: This medication has been shown to cause fetal harm but it isn't assigned a Pregnancy Risk Category. There are small amounts excreted in breast milk. Hydroxyzine Pregnancy And Lactation Text: This medication is not safe during pregnancy and should not be taken. It is also excreted in breast milk and breast feeding isn't recommended. Oral Minoxidil Counseling- I discussed with the patient the risks of oral minoxidil including but not limited to shortness of breath, swelling of the feet or ankles, dizziness, lightheadedness, unwanted hair growth and allergic reaction.  The patient verbalized understanding of the proper use and possible adverse effects of oral minoxidil.  All of the patient's questions and concerns were addressed. Colchicine Counseling:  Patient counseled regarding adverse effects including but not limited to stomach upset (nausea, vomiting, stomach pain, or diarrhea).  Patient instructed to limit alcohol consumption while taking this medication.  Colchicine may reduce blood counts especially with prolonged use.  The patient understands that monitoring of kidney function and blood counts may be required, especially at baseline. The patient verbalized understanding of the proper use and possible adverse effects of colchicine.  All of the patient's questions and concerns were addressed. Litfulo Counseling: I discussed with the patient the risks of Litfulo therapy including but not limited to upper respiratory tract infections, shingles, cold sores, and nausea. Live vaccines should be avoided.  This medication has been linked to serious infections; higher rate of mortality; malignancy and lymphoproliferative disorders; major adverse cardiovascular events; thrombosis; gastrointestinal perforations; neutropenia; lymphopenia; anemia; liver enzyme elevations; and lipid elevations. Enbrel Counseling:  I discussed with the patient the risks of etanercept including but not limited to myelosuppression, immunosuppression, autoimmune hepatitis, demyelinating diseases, lymphoma, and infections.  The patient understands that monitoring is required including a PPD at baseline and must alert us or the primary physician if symptoms of infection or other concerning signs are noted. Cyclophosphamide Pregnancy And Lactation Text: This medication is Pregnancy Category D and it isn't considered safe during pregnancy. This medication is excreted in breast milk. Acitretin Pregnancy And Lactation Text: This medication is Pregnancy Category X and should not be given to women who are pregnant or may become pregnant in the future. This medication is excreted in breast milk. Bactrim Pregnancy And Lactation Text: This medication is Pregnancy Category D and is known to cause fetal risk.  It is also excreted in breast milk. Opzelura Pregnancy And Lactation Text: There is insufficient data to evaluate drug-associated risk for major birth defects, miscarriage, or other adverse maternal or fetal outcomes.  There is a pregnancy registry that monitors pregnancy outcomes in pregnant persons exposed to the medication during pregnancy.  It is unknown if this medication is excreted in breast milk.  Do not breastfeed during treatment and for about 4 weeks after the last dose. Solaraze Counseling:  I discussed with the patient the risks of Solaraze including but not limited to erythema, scaling, itching, weeping, crusting, and pain. Aklief Pregnancy And Lactation Text: It is unknown if this medication is safe to use during pregnancy.  It is unknown if this medication is excreted in breast milk.  Breastfeeding women should use the topical cream on the smallest area of the skin for the shortest time needed while breastfeeding.  Do not apply to nipple and areola. Metronidazole Counseling:  I discussed with the patient the risks of metronidazole including but not limited to seizures, nausea/vomiting, a metallic taste in the mouth, nausea/vomiting and severe allergy. Sski Pregnancy And Lactation Text: This medication is Pregnancy Category D and isn't considered safe during pregnancy. It is excreted in breast milk. 5-Fu Counseling: 5-Fluorouracil Counseling:  I discussed with the patient the risks of 5-fluorouracil including but not limited to erythema, scaling, itching, weeping, crusting, and pain. Bimzelx Pregnancy And Lactation Text: This medication crosses the placenta and the safety is uncertain during pregnancy. It is unknown if this medication is present in breast milk. Birth Control Pills Counseling: Birth Control Pill Counseling: I discussed with the patient the potential side effects of OCPs including but not limited to increased risk of stroke, heart attack, thrombophlebitis, deep venous thrombosis, hepatic adenomas, breast changes, GI upset, headaches, and depression.  The patient verbalized understanding of the proper use and possible adverse effects of OCPs. All of the patient's questions and concerns were addressed. Ketoconazole Counseling:   Patient counseled regarding improving absorption with orange juice.  Adverse effects include but are not limited to breast enlargement, headache, diarrhea, nausea, upset stomach, liver function test abnormalities, taste disturbance, and stomach pain.  There is a rare possibility of liver failure that can occur when taking ketoconazole. The patient understands that monitoring of LFTs may be required, especially at baseline. The patient verbalized understanding of the proper use and possible adverse effects of ketoconazole.  All of the patient's questions and concerns were addressed. Topical Sulfur Applications Counseling: Topical Sulfur Counseling: Patient counseled that this medication may cause skin irritation or allergic reactions.  In the event of skin irritation, the patient was advised to reduce the amount of the drug applied or use it less frequently.   The patient verbalized understanding of the proper use and possible adverse effects of topical sulfur application.  All of the patient's questions and concerns were addressed. Thalidomide Counseling: I discussed with the patient the risks of thalidomide including but not limited to birth defects, anxiety, weakness, chest pain, dizziness, cough and severe allergy. Topical Sulfur Applications Pregnancy And Lactation Text: This medication is considered safe during pregnancy and breast feeding secondary to limited systemic absorption. Ketoconazole Pregnancy And Lactation Text: This medication is Pregnancy Category C and it isn't know if it is safe during pregnancy. It is also excreted in breast milk and breast feeding isn't recommended. Birth Control Pills Pregnancy And Lactation Text: This medication should be avoided if pregnant and for the first 30 days post-partum. Siliq Counseling:  I discussed with the patient the risks of Siliq including but not limited to new or worsening depression, suicidal thoughts and behavior, immunosuppression, malignancy, posterior leukoencephalopathy syndrome, and serious infections.  The patient understands that monitoring is required including a PPD at baseline and must alert us or the primary physician if symptoms of infection or other concerning signs are noted. There is also a special program designed to monitor depression which is required with Siliq. Topical Ketoconazole Counseling: Patient counseled that this medication may cause skin irritation or allergic reactions.  In the event of skin irritation, the patient was advised to reduce the amount of the drug applied or use it less frequently.   The patient verbalized understanding of the proper use and possible adverse effects of ketoconazole.  All of the patient's questions and concerns were addressed. Tetracycline Counseling: Patient counseled regarding possible photosensitivity and increased risk for sunburn.  Patient instructed to avoid sunlight, if possible.  When exposed to sunlight, patients should wear protective clothing, sunglasses, and sunscreen.  The patient was instructed to call the office immediately if the following severe adverse effects occur:  hearing changes, easy bruising/bleeding, severe headache, or vision changes.  The patient verbalized understanding of the proper use and possible adverse effects of tetracycline.  All of the patient's questions and concerns were addressed. Patient understands to avoid pregnancy while on therapy due to potential birth defects. Imiquimod Counseling:  I discussed with the patient the risks of imiquimod including but not limited to erythema, scaling, itching, weeping, crusting, and pain.  Patient understands that the inflammatory response to imiquimod is variable from person to person and was educated regarded proper titration schedule.  If flu-like symptoms develop, patient knows to discontinue the medication and contact us. Odomzo Counseling- I discussed with the patient the risks of Odomzo including but not limited to nausea, vomiting, diarrhea, constipation, weight loss, changes in the sense of taste, decreased appetite, muscle spasms, and hair loss.  The patient verbalized understanding of the proper use and possible adverse effects of Odomzo.  All of the patient's questions and concerns were addressed. Zyclara Counseling:  I discussed with the patient the risks of imiquimod including but not limited to erythema, scaling, itching, weeping, crusting, and pain.  Patient understands that the inflammatory response to imiquimod is variable from person to person and was educated regarded proper titration schedule.  If flu-like symptoms develop, patient knows to discontinue the medication and contact us. Tremfya Counseling: I discussed with the patient the risks of guselkumab including but not limited to immunosuppression, serious infections, and drug reactions.  The patient understands that monitoring is required including a PPD at baseline and must alert us or the primary physician if symptoms of infection or other concerning signs are noted. Oral Minoxidil Pregnancy And Lactation Text: This medication should only be used when clearly needed if you are pregnant, attempting to become pregnant or breast feeding. Low Dose Naltrexone Counseling- I discussed with the patient the potential risks and side effects of low dose naltrexone including but not limited to: more vivid dreams, headaches, nausea, vomiting, abdominal pain, fatigue, dizziness, and anxiety. Bexarotene Counseling:  I discussed with the patient the risks of bexarotene including but not limited to hair loss, dry lips/skin/eyes, liver abnormalities, hyperlipidemia, pancreatitis, depression/suicidal ideation, photosensitivity, drug rash/allergic reactions, hypothyroidism, anemia, leukopenia, infection, cataracts, and teratogenicity.  Patient understands that they will need regular blood tests to check lipid profile, liver function tests, white blood cell count, thyroid function tests and pregnancy test if applicable. Picato Counseling:  I discussed with the patient the risks of Picato including but not limited to erythema, scaling, itching, weeping, crusting, and pain. Litfulo Pregnancy And Lactation Text: Based on animal studies, Lifulo may cause embryo-fetal harm when administered to pregnant women.  The medication should not be used in pregnancy.  Breastfeeding is not recommended during treatment. Cephalexin Counseling: I counseled the patient regarding use of cephalexin as an antibiotic for prophylactic and/or therapeutic purposes. Cephalexin (commonly prescribed under brand name Keflex) is a cephalosporin antibiotic which is active against numerous classes of bacteria, including most skin bacteria. Side effects may include nausea, diarrhea, gastrointestinal upset, rash, hives, yeast infections, and in rare cases, hepatitis, kidney disease, seizures, fever, confusion, neurologic symptoms, and others. Patients with severe allergies to penicillin medications are cautioned that there is about a 10% incidence of cross-reactivity with cephalosporins. When possible, patients with penicillin allergies should use alternatives to cephalosporins for antibiotic therapy. Cyclosporine Counseling:  I discussed with the patient the risks of cyclosporine including but not limited to hypertension, gingival hyperplasia,myelosuppression, immunosuppression, liver damage, kidney damage, neurotoxicity, lymphoma, and serious infections. The patient understands that monitoring is required including baseline blood pressure, CBC, CMP, lipid panel and uric acid, and then 1-2 times monthly CMP and blood pressure. Azelaic Acid Counseling: Patient counseled that medicine may cause skin irritation and to avoid applying near the eyes.  In the event of skin irritation, the patient was advised to reduce the amount of the drug applied or use it less frequently.   The patient verbalized understanding of the proper use and possible adverse effects of azelaic acid.  All of the patient's questions and concerns were addressed. Cimzia Counseling:  I discussed with the patient the risks of Cimzia including but not limited to immunosuppression, allergic reactions and infections.  The patient understands that monitoring is required including a PPD at baseline and must alert us or the primary physician if symptoms of infection or other concerning signs are noted. Solaraze Pregnancy And Lactation Text: This medication is Pregnancy Category B and is considered safe. There is some data to suggest avoiding during the third trimester. It is unknown if this medication is excreted in breast milk. Metronidazole Pregnancy And Lactation Text: This medication is Pregnancy Category B and considered safe during pregnancy.  It is also excreted in breast milk. Cimzia Pregnancy And Lactation Text: This medication crosses the placenta but can be considered safe in certain situations. Cimzia may be excreted in breast milk. Humira Counseling:  I discussed with the patient the risks of adalimumab including but not limited to myelosuppression, immunosuppression, autoimmune hepatitis, demyelinating diseases, lymphoma, and serious infections.  The patient understands that monitoring is required including a PPD at baseline and must alert us or the primary physician if symptoms of infection or other concerning signs are noted. Terbinafine Counseling: Patient counseling regarding adverse effects of terbinafine including but not limited to headache, diarrhea, rash, upset stomach, liver function test abnormalities, itching, taste/smell disturbance, nausea, abdominal pain, and flatulence.  There is a rare possibility of liver failure that can occur when taking terbinafine.  The patient understands that a baseline LFT and kidney function test may be required. The patient verbalized understanding of the proper use and possible adverse effects of terbinafine.  All of the patient's questions and concerns were addressed. Spironolactone Counseling: Patient advised regarding risks of diarrhea, abdominal pain, hyperkalemia, birth defects (for female patients), liver toxicity and renal toxicity. The patient may need blood work to monitor liver and kidney function and potassium levels while on therapy. The patient verbalized understanding of the proper use and possible adverse effects of spironolactone.  All of the patient's questions and concerns were addressed. Azelaic Acid Pregnancy And Lactation Text: This medication is considered safe during pregnancy and breast feeding. Minocycline Counseling: Patient advised regarding possible photosensitivity and discoloration of the teeth, skin, lips, tongue and gums.  Patient instructed to avoid sunlight, if possible.  When exposed to sunlight, patients should wear protective clothing, sunglasses, and sunscreen.  The patient was instructed to call the office immediately if the following severe adverse effects occur:  hearing changes, easy bruising/bleeding, severe headache, or vision changes.  The patient verbalized understanding of the proper use and possible adverse effects of minocycline.  All of the patient's questions and concerns were addressed. Soolantra Counseling: I discussed with the patients the risks of topial Soolantra. This is a medicine which decreases the number of mites and inflammation in the skin. You experience burning, stinging, eye irritation or allergic reactions.  Please call our office if you develop any problems from using this medication. Drysol Counseling:  I discussed with the patient the risks of drysol/aluminum chloride including but not limited to skin rash, itching, irritation, burning. Wartpeel Counseling:  I discussed with the patient the risks of Wartpeel including but not limited to erythema, scaling, itching, weeping, crusting, and pain. Albendazole Counseling:  I discussed with the patient the risks of albendazole including but not limited to cytopenia, kidney damage, nausea/vomiting and severe allergy.  The patient understands that this medication is being used in an off-label manner. Dapsone Counseling: I discussed with the patient the risks of dapsone including but not limited to hemolytic anemia, agranulocytosis, rashes, methemoglobinemia, kidney failure, peripheral neuropathy, headaches, GI upset, and liver toxicity.  Patients who start dapsone require monitoring including baseline LFTs and weekly CBCs for the first month, then every month thereafter.  The patient verbalized understanding of the proper use and possible adverse effects of dapsone.  All of the patient's questions and concerns were addressed. Olumiant Counseling: I discussed with the patient the risks of Olumiant therapy including but not limited to upper respiratory tract infections, shingles, cold sores, and nausea. Live vaccines should be avoided.  This medication has been linked to serious infections; higher rate of mortality; malignancy and lymphoproliferative disorders; major adverse cardiovascular events; thrombosis; gastrointestinal perforations; neutropenia; lymphopenia; anemia; liver enzyme elevations; and lipid elevations. Klisyri Counseling:  I discussed with the patient the risks of Klisyri including but not limited to erythema, scaling, itching, weeping, crusting, and pain. Low Dose Naltrexone Pregnancy And Lactation Text: Naltrexone is pregnancy category C.  There have been no adequate and well-controlled studies in pregnant women.  It should be used in pregnancy only if the potential benefit justifies the potential risk to the fetus.   Limited data indicates that naltrexone is minimally excreted into breastmilk. Detail Level: Simple Bexarotene Pregnancy And Lactation Text: This medication is Pregnancy Category X and should not be given to women who are pregnant or may become pregnant. This medication should not be used if you are breast feeding. Otezla Counseling: The side effects of Otezla were discussed with the patient, including but not limited to worsening or new depression, weight loss, diarrhea, nausea, upper respiratory tract infection, and headache. Patient instructed to call the office should any adverse effect occur.  The patient verbalized understanding of the proper use and possible adverse effects of Otezla.  All the patient's questions and concerns were addressed. Cephalexin Pregnancy And Lactation Text: This medication is Pregnancy Category B and considered safe during pregnancy.  It is also excreted in breast milk but can be used safely for shorter doses. Otezla Pregnancy And Lactation Text: This medication is Pregnancy Category C and it isn't known if it is safe during pregnancy. It is unknown if it is excreted in breast milk. Fluconazole Counseling:  Patient counseled regarding adverse effects of fluconazole including but not limited to headache, diarrhea, nausea, upset stomach, liver function test abnormalities, taste disturbance, and stomach pain.  There is a rare possibility of liver failure that can occur when taking fluconazole.  The patient understands that monitoring of LFTs and kidney function test may be required, especially at baseline. The patient verbalized understanding of the proper use and possible adverse effects of fluconazole.  All of the patient's questions and concerns were addressed. Dutasteride Male Counseling: Dustasteride Counseling:  I discussed with the patient the risks of use of dutasteride including but not limited to decreased libido, decreased ejaculate volume, and gynecomastia. Women who can become pregnant should not handle medication.  All of the patient's questions and concerns were addressed. Methotrexate Counseling:  Patient counseled regarding adverse effects of methotrexate including but not limited to nausea, vomiting, abnormalities in liver function tests. Patients may develop mouth sores, rash, diarrhea, and abnormalities in blood counts. The patient understands that monitoring is required including LFT's and blood counts.  There is a rare possibility of scarring of the liver and lung problems that can occur when taking methotrexate. Persistent nausea, loss of appetite, pale stools, dark urine, cough, and shortness of breath should be reported immediately. Patient advised to discontinue methotrexate treatment at least three months before attempting to become pregnant.  I discussed the need for folate supplements while taking methotrexate.  These supplements can decrease side effects during methotrexate treatment. The patient verbalized understanding of the proper use and possible adverse effects of methotrexate.  All of the patient's questions and concerns were addressed. Opioid Counseling: I discussed with the patient the potential side effects of opioids including but not limited to addiction, altered mental status, and depression. I stressed avoiding alcohol, benzodiazepines, muscle relaxants and sleep aids unless specifically okayed by a physician. The patient verbalized understanding of the proper use and possible adverse effects of opioids. All of the patient's questions and concerns were addressed. They were instructed to flush the remaining pills down the toilet if they did not need them for pain. Tranexamic Acid Counseling:  Patient advised of the small risk of bleeding problems with tranexamic acid. They were also instructed to call if they developed any nausea, vomiting or diarrhea. All of the patient's questions and concerns were addressed. Benzoyl Peroxide Counseling: Patient counseled that medicine may cause skin irritation and bleach clothing.  In the event of skin irritation, the patient was advised to reduce the amount of the drug applied or use it less frequently.   The patient verbalized understanding of the proper use and possible adverse effects of benzoyl peroxide.  All of the patient's questions and concerns were addressed. Soolantra Pregnancy And Lactation Text: This medication is Pregnancy Category C. This medication is considered safe during breast feeding. Cosentyx Counseling:  I discussed with the patient the risks of Cosentyx including but not limited to worsening of Crohn's disease, immunosuppression, allergic reactions and infections.  The patient understands that monitoring is required including a PPD at baseline and must alert us or the primary physician if symptoms of infection or other concerning signs are noted. Spironolactone Pregnancy And Lactation Text: This medication can cause feminization of the male fetus and should be avoided during pregnancy. The active metabolite is also found in breast milk. Simponi Counseling:  I discussed with the patient the risks of golimumab including but not limited to myelosuppression, immunosuppression, autoimmune hepatitis, demyelinating diseases, lymphoma, and serious infections.  The patient understands that monitoring is required including a PPD at baseline and must alert us or the primary physician if symptoms of infection or other concerning signs are noted. Winlevi Counseling:  I discussed with the patient the risks of topical clascoterone including but not limited to erythema, scaling, itching, and stinging. Patient voiced their understanding. Topical Retinoid counseling:  Patient advised to apply a pea-sized amount only at bedtime and wait 30 minutes after washing their face before applying.  If too drying, patient may add a non-comedogenic moisturizer. The patient verbalized understanding of the proper use and possible adverse effects of retinoids.  All of the patient's questions and concerns were addressed. Ivermectin Counseling:  Patient instructed to take medication on an empty stomach with a full glass of water.  Patient informed of potential adverse effects including but not limited to nausea, diarrhea, dizziness, itching, and swelling of the extremities or lymph nodes.  The patient verbalized understanding of the proper use and possible adverse effects of ivermectin.  All of the patient's questions and concerns were addressed. Klisyri Pregnancy And Lactation Text: It is unknown if this medication can harm a developing fetus or if it is excreted in breast milk. Niacinamide Counseling: I recommended taking niacin or niacinamide, also know as vitamin B3, twice daily. Recent evidence suggests that taking vitamin B3 (500 mg twice daily) can reduce the risk of actinic keratoses and non-melanoma skin cancers. Side effects of vitamin B3 include flushing and headache. Olumiant Pregnancy And Lactation Text: Based on animal studies, Olumiant may cause embryo-fetal harm when administered to pregnant women.  The medication should not be used in pregnancy.  Breastfeeding is not recommended during treatment. Isotretinoin Counseling: Patient should get monthly blood tests, not donate blood, not drive at night if vision affected, not share medication, and not undergo elective surgery for 6 months after tx completed. Side effects reviewed, pt to contact office should one occur. Dapsone Pregnancy And Lactation Text: This medication is Pregnancy Category C and is not considered safe during pregnancy or breast feeding. Xolair Counseling:  Patient informed of potential adverse effects including but not limited to fever, muscle aches, rash and allergic reactions.  The patient verbalized understanding of the proper use and possible adverse effects of Xolair.  All of the patient's questions and concerns were addressed. Protopic Counseling: Patient may experience a mild burning sensation during topical application. Protopic is not approved in children less than 2 years of age. There have been case reports of hematologic and skin malignancies in patients using topical calcineurin inhibitors although causality is questionable. Clindamycin Counseling: I counseled the patient regarding use of clindamycin as an antibiotic for prophylactic and/or therapeutic purposes. Clindamycin is active against numerous classes of bacteria, including skin bacteria. Side effects may include nausea, diarrhea, gastrointestinal upset, rash, hives, yeast infections, and in rare cases, colitis. Clindamycin Pregnancy And Lactation Text: This medication can be used in pregnancy if certain situations. Clindamycin is also present in breast milk. Niacinamide Pregnancy And Lactation Text: These medications are considered safe during pregnancy. Rinvoq Counseling: I discussed with the patient the risks of Rinvoq therapy including but not limited to upper respiratory tract infections, shingles, cold sores, bronchitis, nausea, cough, fever, acne, and headache. Live vaccines should be avoided.  This medication has been linked to serious infections; higher rate of mortality; malignancy and lymphoproliferative disorders; major adverse cardiovascular events; thrombosis; thrombocytopenia, anemia, and neutropenia; lipid elevations; liver enzyme elevations; and gastrointestinal perforations. Gabapentin Counseling: I discussed with the patient the risks of gabapentin including but not limited to dizziness, somnolence, fatigue and ataxia. Dutasteride Female Counseling: Dutasteride Counseling:  I discussed with the patient the risks of use of dutasteride including but not limited to decreased libido and sexual dysfunction. Explained the teratogenic nature of the medication and stressed the importance of not getting pregnant during treatment. All of the patient's questions and concerns were addressed. Protopic Pregnancy And Lactation Text: This medication is Pregnancy Category C. It is unknown if this medication is excreted in breast milk when applied topically. Isotretinoin Pregnancy And Lactation Text: This medication is Pregnancy Category X and is considered extremely dangerous during pregnancy. It is unknown if it is excreted in breast milk. Oxybutynin Counseling:  I discussed with the patient the risks of oxybutynin including but not limited to skin rash, drowsiness, dry mouth, difficulty urinating, and blurred vision. Benzoyl Peroxide Pregnancy And Lactation Text: This medication is Pregnancy Category C. It is unknown if benzoyl peroxide is excreted in breast milk. Tranexamic Acid Pregnancy And Lactation Text: It is unknown if this medication is safe during pregnancy or breast feeding. Opioid Pregnancy And Lactation Text: These medications can lead to premature delivery and should be avoided during pregnancy. These medications are also present in breast milk in small amounts. Methotrexate Pregnancy And Lactation Text: This medication is Pregnancy Category X and is known to cause fetal harm. This medication is excreted in breast milk. Quinolones Counseling:  I discussed with the patient the risks of fluoroquinolones including but not limited to GI upset, allergic reaction, drug rash, diarrhea, dizziness, photosensitivity, yeast infections, liver function test abnormalities, tendonitis/tendon rupture. Elidel Counseling: Patient may experience a mild burning sensation during topical application. Elidel is not approved in children less than 2 years of age. There have been case reports of hematologic and skin malignancies in patients using topical calcineurin inhibitors although causality is questionable. Ilumya Counseling: I discussed with the patient the risks of tildrakizumab including but not limited to immunosuppression, malignancy, posterior leukoencephalopathy syndrome, and serious infections.  The patient understands that monitoring is required including a PPD at baseline and must alert us or the primary physician if symptoms of infection or other concerning signs are noted. Sotyktu Counseling:  I discussed the most common side effects of Sotyktu including: common cold, sore throat, sinus infections, cold sores, canker sores, folliculitis, and acne.  I also discussed more serious side effects of Sotyktu including but not limited to: serious allergic reactions; increased risk for infections such as TB; cancers such as lymphomas; rhabdomyolysis and elevated CPK; and elevated triglycerides and liver enzymes.

## 2024-10-24 ENCOUNTER — EMERGENCY (EMERGENCY)
Facility: HOSPITAL | Age: 84
LOS: 1 days | Discharge: HOME | End: 2024-10-24
Attending: STUDENT IN AN ORGANIZED HEALTH CARE EDUCATION/TRAINING PROGRAM | Admitting: STUDENT IN AN ORGANIZED HEALTH CARE EDUCATION/TRAINING PROGRAM
Payer: MEDICARE

## 2024-10-24 VITALS
RESPIRATION RATE: 18 BRPM | TEMPERATURE: 98 F | DIASTOLIC BLOOD PRESSURE: 75 MMHG | HEIGHT: 59 IN | SYSTOLIC BLOOD PRESSURE: 150 MMHG | WEIGHT: 160.06 LBS | OXYGEN SATURATION: 98 % | HEART RATE: 79 BPM

## 2024-10-24 VITALS
DIASTOLIC BLOOD PRESSURE: 83 MMHG | TEMPERATURE: 98 F | RESPIRATION RATE: 18 BRPM | OXYGEN SATURATION: 96 % | SYSTOLIC BLOOD PRESSURE: 142 MMHG | HEART RATE: 64 BPM

## 2024-10-24 DIAGNOSIS — Z90.49 ACQUIRED ABSENCE OF OTHER SPECIFIED PARTS OF DIGESTIVE TRACT: Chronic | ICD-10-CM

## 2024-10-24 DIAGNOSIS — Z98.89 OTHER SPECIFIED POSTPROCEDURAL STATES: Chronic | ICD-10-CM

## 2024-10-24 LAB
ALBUMIN SERPL ELPH-MCNC: 4.2 G/DL — SIGNIFICANT CHANGE UP (ref 3.3–5)
ALP SERPL-CCNC: 127 U/L — HIGH (ref 40–120)
ALT FLD-CCNC: 21 U/L — SIGNIFICANT CHANGE UP (ref 4–33)
ANION GAP SERPL CALC-SCNC: 14 MMOL/L — SIGNIFICANT CHANGE UP (ref 7–14)
AST SERPL-CCNC: 29 U/L — SIGNIFICANT CHANGE UP (ref 4–32)
BASOPHILS # BLD AUTO: 0.09 K/UL — SIGNIFICANT CHANGE UP (ref 0–0.2)
BASOPHILS NFR BLD AUTO: 0.9 % — SIGNIFICANT CHANGE UP (ref 0–2)
BILIRUB SERPL-MCNC: 0.9 MG/DL — SIGNIFICANT CHANGE UP (ref 0.2–1.2)
BUN SERPL-MCNC: 12 MG/DL — SIGNIFICANT CHANGE UP (ref 7–23)
CALCIUM SERPL-MCNC: 9.4 MG/DL — SIGNIFICANT CHANGE UP (ref 8.4–10.5)
CHLORIDE SERPL-SCNC: 95 MMOL/L — LOW (ref 98–107)
CO2 SERPL-SCNC: 24 MMOL/L — SIGNIFICANT CHANGE UP (ref 22–31)
CREAT SERPL-MCNC: 1.14 MG/DL — SIGNIFICANT CHANGE UP (ref 0.5–1.3)
EGFR: 47 ML/MIN/1.73M2 — LOW
EOSINOPHIL # BLD AUTO: 0.65 K/UL — HIGH (ref 0–0.5)
EOSINOPHIL NFR BLD AUTO: 6.5 % — HIGH (ref 0–6)
FLUAV AG NPH QL: SIGNIFICANT CHANGE UP
FLUBV AG NPH QL: SIGNIFICANT CHANGE UP
GLUCOSE SERPL-MCNC: 167 MG/DL — HIGH (ref 70–99)
HCT VFR BLD CALC: 39.1 % — SIGNIFICANT CHANGE UP (ref 34.5–45)
HGB BLD-MCNC: 13.2 G/DL — SIGNIFICANT CHANGE UP (ref 11.5–15.5)
IANC: 5.94 K/UL — SIGNIFICANT CHANGE UP (ref 1.8–7.4)
IMM GRANULOCYTES NFR BLD AUTO: 0.8 % — SIGNIFICANT CHANGE UP (ref 0–0.9)
LYMPHOCYTES # BLD AUTO: 2.56 K/UL — SIGNIFICANT CHANGE UP (ref 1–3.3)
LYMPHOCYTES # BLD AUTO: 25.4 % — SIGNIFICANT CHANGE UP (ref 13–44)
MCHC RBC-ENTMCNC: 28.4 PG — SIGNIFICANT CHANGE UP (ref 27–34)
MCHC RBC-ENTMCNC: 33.8 GM/DL — SIGNIFICANT CHANGE UP (ref 32–36)
MCV RBC AUTO: 84.1 FL — SIGNIFICANT CHANGE UP (ref 80–100)
MONOCYTES # BLD AUTO: 0.74 K/UL — SIGNIFICANT CHANGE UP (ref 0–0.9)
MONOCYTES NFR BLD AUTO: 7.4 % — SIGNIFICANT CHANGE UP (ref 2–14)
NEUTROPHILS # BLD AUTO: 5.94 K/UL — SIGNIFICANT CHANGE UP (ref 1.8–7.4)
NEUTROPHILS NFR BLD AUTO: 59 % — SIGNIFICANT CHANGE UP (ref 43–77)
NRBC # BLD: 0 /100 WBCS — SIGNIFICANT CHANGE UP (ref 0–0)
NRBC # FLD: 0 K/UL — SIGNIFICANT CHANGE UP (ref 0–0)
PLATELET # BLD AUTO: 285 K/UL — SIGNIFICANT CHANGE UP (ref 150–400)
POTASSIUM SERPL-MCNC: 4.7 MMOL/L — SIGNIFICANT CHANGE UP (ref 3.5–5.3)
POTASSIUM SERPL-SCNC: 4.7 MMOL/L — SIGNIFICANT CHANGE UP (ref 3.5–5.3)
PROT SERPL-MCNC: 7.7 G/DL — SIGNIFICANT CHANGE UP (ref 6–8.3)
RBC # BLD: 4.65 M/UL — SIGNIFICANT CHANGE UP (ref 3.8–5.2)
RBC # FLD: 13.6 % — SIGNIFICANT CHANGE UP (ref 10.3–14.5)
RSV RNA NPH QL NAA+NON-PROBE: SIGNIFICANT CHANGE UP
SARS-COV-2 RNA SPEC QL NAA+PROBE: SIGNIFICANT CHANGE UP
SODIUM SERPL-SCNC: 133 MMOL/L — LOW (ref 135–145)
TROPONIN T, HIGH SENSITIVITY RESULT: 11 NG/L — SIGNIFICANT CHANGE UP
TROPONIN T, HIGH SENSITIVITY RESULT: 12 NG/L — SIGNIFICANT CHANGE UP
WBC # BLD: 10.06 K/UL — SIGNIFICANT CHANGE UP (ref 3.8–10.5)
WBC # FLD AUTO: 10.06 K/UL — SIGNIFICANT CHANGE UP (ref 3.8–10.5)

## 2024-10-24 PROCEDURE — 93010 ELECTROCARDIOGRAM REPORT: CPT

## 2024-10-24 PROCEDURE — 99285 EMERGENCY DEPT VISIT HI MDM: CPT

## 2024-10-24 PROCEDURE — 71046 X-RAY EXAM CHEST 2 VIEWS: CPT | Mod: 26

## 2024-10-24 RX ORDER — AZITHROMYCIN 250 MG/1
500 TABLET, FILM COATED ORAL ONCE
Refills: 0 | Status: COMPLETED | OUTPATIENT
Start: 2024-10-24 | End: 2024-10-24

## 2024-10-24 RX ORDER — ACETAMINOPHEN 325 MG
1000 TABLET ORAL ONCE
Refills: 0 | Status: COMPLETED | OUTPATIENT
Start: 2024-10-24 | End: 2024-10-24

## 2024-10-24 RX ORDER — AZITHROMYCIN 250 MG/1
250 TABLET, FILM COATED ORAL
Qty: 4 | Refills: 0
Start: 2024-10-24 | End: 2024-10-27

## 2024-10-24 RX ORDER — DOXYCYCLINE HYCLATE 100 MG
100 CAPSULE ORAL ONCE
Refills: 0 | Status: COMPLETED | OUTPATIENT
Start: 2024-10-24 | End: 2024-10-24

## 2024-10-24 RX ORDER — DOXYCYCLINE HYCLATE 100 MG
1 CAPSULE ORAL
Qty: 10 | Refills: 0
Start: 2024-10-24 | End: 2024-10-28

## 2024-10-24 RX ORDER — CETIRIZINE HYDROCHLORIDE 10 MG/1
10 TABLET ORAL ONCE
Refills: 0 | Status: COMPLETED | OUTPATIENT
Start: 2024-10-24 | End: 2024-10-24

## 2024-10-24 RX ADMIN — AZITHROMYCIN 500 MILLIGRAM(S): 250 TABLET, FILM COATED ORAL at 20:06

## 2024-10-24 RX ADMIN — CETIRIZINE HYDROCHLORIDE 10 MILLIGRAM(S): 10 TABLET ORAL at 21:30

## 2024-10-24 RX ADMIN — Medication 100 MILLIGRAM(S): at 20:06

## 2024-10-24 RX ADMIN — Medication 400 MILLIGRAM(S): at 18:31

## 2024-10-24 NOTE — ED ADULT NURSE NOTE - NSFALLUNIVINTERV_ED_ALL_ED
Bed/Stretcher in lowest position, wheels locked, appropriate side rails in place/Call bell, personal items and telephone in reach/Instruct patient to call for assistance before getting out of bed/chair/stretcher/Non-slip footwear applied when patient is off stretcher/[place] to call system/Physically safe environment - no spills, clutter or unnecessary equipment/Purposeful proactive rounding/Room/bathroom lighting operational, light cord in reach

## 2024-10-24 NOTE — ED PROVIDER NOTE - CLINICAL SUMMARY MEDICAL DECISION MAKING FREE TEXT BOX
Afebrile hemodynamic stable female presents to ED for persistent dry cough with associated back/chest pain x 10 days.  Clear breath sounds bilaterally satting well on RA.  No lower extreme edema or calf tenderness.  EKG NSR with no ischemic changes.  Ordered basic labs, flu/COVID, troponin, CXR to rule out ACS versus PNA versus viral URI versus infectious etiology.  Given Ofirmev, reassess.

## 2024-10-24 NOTE — ED PROVIDER NOTE - NSFOLLOWUPINSTRUCTIONS_ED_ALL_ED_FT
Reason for ED Visit:  -10 days of cough with resulting chest and back pain    Findings/Diagnosis:  - Right middle-zone pneumonia    Please return to the ED immediately for any new, worsening, or concerning symptoms including, but not limited to:   - Difficulty breathing   - Persistent chest pain especially worse with exertion    Please take the following medications at home:   - Azithromycin 250 mg daily for 4 days   - Doxycycline 100 mg twice daily for 5 days    Please follow up with [your primary care provider] regarding this ED visit.    Thank you for choosing us for your care. Reason for ED Visit:  -10 days of cough with resulting chest and back pain    Findings/Diagnosis:  - Right middle-zone pneumonia    Please return to the ED immediately for any new, worsening, or concerning symptoms including, but not limited to:   - Difficulty breathing   - Persistent chest pain especially worse with exertion   - Persistent itching or rashes especially if there is difficult breathing    Please take the following medications at home:   - Levofloxacin 750 mg daily for 7 days    Please follow up with your primary care provider tomorrow regarding this ED visit.    Thank you for choosing us for your care. Reason for ED Visit:  -10 days of cough with resulting chest and back pain    Findings/Diagnosis:  - Right middle-zone pneumonia    Please return to the ED immediately for any new, worsening, or concerning symptoms including, but not limited to:   - Difficulty breathing   - Persistent chest pain especially worse with exertion   - Persistent itching or rashes especially if there is difficult breathing    Please take the following medications at home:   - Levofloxacin 750 mg daily for 7 days   - If you have itching, please take loratidine or cetirizine before considering benadryl (over the counter).    Please follow up with your primary care provider tomorrow regarding this ED visit.    Thank you for choosing us for your care.

## 2024-10-24 NOTE — ED PROVIDER NOTE - ATTENDING CONTRIBUTION TO CARE
85 yo F hx HTN, HLD, DM2, hypothyroidism, diverticulitis, presenting for evaluation of cough x 10 days, now with associated chest pain and body pain when coughing. No fevers/chills. No leg swelling. No hemoptysis. She was started on Cefdinir but stopped taking 2/2 rash.     VITALS: reviewed  GEN: NAD, A & O x 4  HEAD/EYES: NCAT, EOMI, anicteric sclerae,   ENT: mucus membranes moist, oropharynx WNL, trachea midline,  RESP: lungs CTA with equal breath sounds bilaterally, intermittent productive cough, chest wall nontender and atraumatic  CV: heart with reg rhythm S1, S2, distal pulses intact and symmetric bilaterally  ABDOMEN: normoactive bowel sounds, soft, nondistended, nontender, no palpable masses  : no CVAT  MSK: extremities atraumatic and nontender, no edema, no asymmetry.  SKIN: warm, dry, no rash, no bruising, no cyanosis. color appropriate for ethnicity  NEURO: alert, mentating appropriately, no facial asymmetry.   PSYCH: Affect appropriate    Likely bronchitis in setting of recent URI sx. Lungs clear, no respiratory distress/increased wob. No leg pain/swelling. EKG without signs of ischemia and chest pain isolated to coughing. Will tx with meds, obtain xr r/o pna, labs, and reassess. likely dc home

## 2024-10-24 NOTE — ED ADULT NURSE NOTE - OBJECTIVE STATEMENT
Pt presents to the ED with complaints of cough, chest pain, and back pain. Pt reports she has seen PCP who prescribed antibiotics to treat pneumonia. Pt did not have Chest xray or CT done. Antibiotics stopped due to pt developing a rash. Pt reports cough has been persistent for 2 weeks, and not resolving. Chest pain and back pain exacerbated by cough. PMH of HTN, HLD, DM, and hypothyroidism. Pt alert and oriented x4. Pt ambulated with steady gait. Pt denies shortness of breath. Respirations even and unlabored. 20g IV placed in LAC. Labs collected. Ofirmev infusing. Support ongoing. Denae

## 2024-10-24 NOTE — ED PROVIDER NOTE - OBJECTIVE STATEMENT
84 year old F with T2DM, hypothyroidism, HTN, HLD, hx of diverticulitis presents to ED for persistent nonproductive cough with associated chest pain/back pain 2/2 cough x 10 days.  States that she was started on cefdinir 10/18 for suspected pneumonia with no outpatient imaging.  Notes not taking cefdinir 2/2 allergic reaction (pruritic reaction).  Denies fever, chills, nausea, vomiting, SOB, abdominal pain, urinary symptoms, change in bowel movement.  No recent travel or sick contacts.  Notes use of antitussive syrup with no relief.

## 2024-10-24 NOTE — ED PROVIDER NOTE - PROGRESS NOTE DETAILS
Beatris, PGY3: Patient signed out to me by day team.    [facility] - 84yF [6A]  HTN, HLD  10d coughs, CP, back pain  cefdinir (stopped due to rashes, 1d 10/18)  [ ] PNA > azithro/doxy [facility] [phone]    Will discharge patient on Zithromax and doxycycline as patient has cephalosporin allergy.  Day team updated patient and answered questions. Will give first dose on azithromycin and doxycycline in the ED prior to discharge to ensure patient does not have a reaction prior to discharge. Beatris PGY3: No allergic reaction on reassessment. Beatris PGY3: No allergic reaction on reassessment, but patient does complain of itching. Beatris PGY3: Patient does not want to stay in the CDU for further evaluation.  She states that she has itching sensation with several antibiotic and occasionally has rashes.  She states that she has medication at home that usually takes for the itching sensation.  Discussed that patient needs to take some sort of antibiotics for pneumonia.  Offered to switch antibiotics to Levaquin and asked patient to follow-up with doctor tomorrow.  Patient stated that she preferred this plan.  Will discharge patient home on Levaquin and outpatient follow-up.  Will recommend the patient take loratadine for itching sensation over Benadryl as it will make her more drowsy.  Patient endorsed understanding. Beatris PGY3: No rashes on reassessment, but patient does complain of itching sensation, Unclear if she has an allergic reaction to doxycycline or azithromycin. Will consider levofloxacin and observe to ensure that she does not have an allergic reaction. Patient has a history of stopping her medications when she gets itchy. CDU called.

## 2024-10-24 NOTE — ED ADULT NURSE REASSESSMENT NOTE - NS ED NURSE REASSESS COMMENT FT1
Report received from [facility] [place]. Patient A&Ox4, respirations even and unlabored, resting in stretcher. Patient states improvement in condition. Patient offers no complaints at this time. Patient to be discharged home with family member.

## 2024-10-24 NOTE — ED ADULT TRIAGE NOTE - CHIEF COMPLAINT QUOTE
Patient has c/o nonproductive cough with back pain for the past two weeks tthat has not been getting any better. Patient given Cefidinr for treatment and she has not taken it. She has been taking the syrup. Type 2 DM: FS: 183

## 2024-10-24 NOTE — ED PROVIDER NOTE - PATIENT PORTAL LINK FT
You can access the FollowMyHealth Patient Portal offered by [facility] by registering at the following website: [email or web address]. By joining [facility]’s FollowMyHealth portal, you will also be able to view your health information using other applications (apps) compatible with our system.

## 2024-10-25 NOTE — ED POST DISCHARGE NOTE - RESULT SUMMARY
Called by pt's pharmacy for rx clarification - based on provider note and prescription writer discussed that levofloxacin was prescribed for pna, and azithromycin/doxycycline were intended to be cancelled. Instructions for levofloxacin were confirmed.

## 2024-11-13 ENCOUNTER — EMERGENCY (EMERGENCY)
Facility: HOSPITAL | Age: 84
LOS: 1 days | Discharge: HOME | End: 2024-11-13
Attending: EMERGENCY MEDICINE | Admitting: EMERGENCY MEDICINE
Payer: MEDICARE

## 2024-11-13 VITALS
WEIGHT: 160.06 LBS | HEIGHT: 59 IN | HEART RATE: 95 BPM | TEMPERATURE: 98 F | OXYGEN SATURATION: 96 % | DIASTOLIC BLOOD PRESSURE: 69 MMHG | SYSTOLIC BLOOD PRESSURE: 134 MMHG | RESPIRATION RATE: 18 BRPM

## 2024-11-13 VITALS
SYSTOLIC BLOOD PRESSURE: 148 MMHG | RESPIRATION RATE: 18 BRPM | TEMPERATURE: 98 F | DIASTOLIC BLOOD PRESSURE: 76 MMHG | HEART RATE: 84 BPM | OXYGEN SATURATION: 100 %

## 2024-11-13 DIAGNOSIS — Z90.49 ACQUIRED ABSENCE OF OTHER SPECIFIED PARTS OF DIGESTIVE TRACT: Chronic | ICD-10-CM

## 2024-11-13 DIAGNOSIS — Z98.89 OTHER SPECIFIED POSTPROCEDURAL STATES: Chronic | ICD-10-CM

## 2024-11-13 LAB
ALBUMIN SERPL ELPH-MCNC: 3.8 G/DL — SIGNIFICANT CHANGE UP (ref 3.3–5)
ALP SERPL-CCNC: 89 U/L — SIGNIFICANT CHANGE UP (ref 40–120)
ALT FLD-CCNC: 10 U/L — SIGNIFICANT CHANGE UP (ref 4–33)
ANION GAP SERPL CALC-SCNC: 14 MMOL/L — SIGNIFICANT CHANGE UP (ref 7–14)
APTT BLD: 36.2 SEC — HIGH (ref 24.5–35.6)
AST SERPL-CCNC: 35 U/L — HIGH (ref 4–32)
BASOPHILS # BLD AUTO: 0.04 K/UL — SIGNIFICANT CHANGE UP (ref 0–0.2)
BASOPHILS NFR BLD AUTO: 0.5 % — SIGNIFICANT CHANGE UP (ref 0–2)
BILIRUB SERPL-MCNC: 0.7 MG/DL — SIGNIFICANT CHANGE UP (ref 0.2–1.2)
BLD GP AB SCN SERPL QL: NEGATIVE — SIGNIFICANT CHANGE UP
BUN SERPL-MCNC: 14 MG/DL — SIGNIFICANT CHANGE UP (ref 7–23)
CALCIUM SERPL-MCNC: 9 MG/DL — SIGNIFICANT CHANGE UP (ref 8.4–10.5)
CHLORIDE SERPL-SCNC: 100 MMOL/L — SIGNIFICANT CHANGE UP (ref 98–107)
CO2 SERPL-SCNC: 17 MMOL/L — LOW (ref 22–31)
CREAT SERPL-MCNC: 1.01 MG/DL — SIGNIFICANT CHANGE UP (ref 0.5–1.3)
EGFR: 54 ML/MIN/1.73M2 — LOW
EGFR: 54 ML/MIN/1.73M2 — LOW
EOSINOPHIL # BLD AUTO: 0.22 K/UL — SIGNIFICANT CHANGE UP (ref 0–0.5)
EOSINOPHIL NFR BLD AUTO: 3 % — SIGNIFICANT CHANGE UP (ref 0–6)
GLUCOSE SERPL-MCNC: 104 MG/DL — HIGH (ref 70–99)
HCT VFR BLD CALC: 41.1 % — SIGNIFICANT CHANGE UP (ref 34.5–45)
HGB BLD-MCNC: 13.6 G/DL — SIGNIFICANT CHANGE UP (ref 11.5–15.5)
IANC: 4.19 K/UL — SIGNIFICANT CHANGE UP (ref 1.8–7.4)
IMM GRANULOCYTES NFR BLD AUTO: 0.3 % — SIGNIFICANT CHANGE UP (ref 0–0.9)
INR BLD: 0.97 RATIO — SIGNIFICANT CHANGE UP (ref 0.85–1.16)
LYMPHOCYTES # BLD AUTO: 2.34 K/UL — SIGNIFICANT CHANGE UP (ref 1–3.3)
LYMPHOCYTES # BLD AUTO: 31.7 % — SIGNIFICANT CHANGE UP (ref 13–44)
MCHC RBC-ENTMCNC: 29 PG — SIGNIFICANT CHANGE UP (ref 27–34)
MCHC RBC-ENTMCNC: 33.1 G/DL — SIGNIFICANT CHANGE UP (ref 32–36)
MCV RBC AUTO: 87.6 FL — SIGNIFICANT CHANGE UP (ref 80–100)
MONOCYTES # BLD AUTO: 0.58 K/UL — SIGNIFICANT CHANGE UP (ref 0–0.9)
MONOCYTES NFR BLD AUTO: 7.8 % — SIGNIFICANT CHANGE UP (ref 2–14)
NEUTROPHILS # BLD AUTO: 4.19 K/UL — SIGNIFICANT CHANGE UP (ref 1.8–7.4)
NEUTROPHILS NFR BLD AUTO: 56.7 % — SIGNIFICANT CHANGE UP (ref 43–77)
NRBC # BLD AUTO: 0 K/UL — SIGNIFICANT CHANGE UP (ref 0–0)
NRBC # BLD: 0 /100 WBCS — SIGNIFICANT CHANGE UP (ref 0–0)
NRBC # FLD: 0 K/UL — SIGNIFICANT CHANGE UP (ref 0–0)
NRBC BLD-RTO: 0 /100 WBCS — SIGNIFICANT CHANGE UP (ref 0–0)
OB PNL STL: NEGATIVE — SIGNIFICANT CHANGE UP
PLATELET # BLD AUTO: 225 K/UL — SIGNIFICANT CHANGE UP (ref 150–400)
POTASSIUM SERPL-MCNC: 4.4 MMOL/L — SIGNIFICANT CHANGE UP (ref 3.5–5.3)
POTASSIUM SERPL-SCNC: 4.4 MMOL/L — SIGNIFICANT CHANGE UP (ref 3.5–5.3)
PROT SERPL-MCNC: 7 G/DL — SIGNIFICANT CHANGE UP (ref 6–8.3)
PROTHROM AB SERPL-ACNC: 11.6 SEC — SIGNIFICANT CHANGE UP (ref 9.9–13.4)
RBC # BLD: 4.69 M/UL — SIGNIFICANT CHANGE UP (ref 3.8–5.2)
RBC # FLD: 14 % — SIGNIFICANT CHANGE UP (ref 10.3–14.5)
RH IG SCN BLD-IMP: POSITIVE — SIGNIFICANT CHANGE UP
SODIUM SERPL-SCNC: 131 MMOL/L — LOW (ref 135–145)
WBC # BLD: 7.39 K/UL — SIGNIFICANT CHANGE UP (ref 3.8–10.5)
WBC # FLD AUTO: 7.39 K/UL — SIGNIFICANT CHANGE UP (ref 3.8–10.5)

## 2024-11-13 PROCEDURE — 99285 EMERGENCY DEPT VISIT HI MDM: CPT

## 2024-11-13 PROCEDURE — 74177 CT ABD & PELVIS W/CONTRAST: CPT | Mod: 26,MC

## 2024-11-13 RX ORDER — ACETAMINOPHEN 500 MG/5ML
1000 LIQUID (ML) ORAL ONCE
Refills: 0 | Status: COMPLETED | OUTPATIENT
Start: 2024-11-13 | End: 2024-11-13

## 2024-11-13 RX ORDER — ONDANSETRON HCL/PF 4 MG/2 ML
4 VIAL (ML) INJECTION ONCE
Refills: 0 | Status: COMPLETED | OUTPATIENT
Start: 2024-11-13 | End: 2024-11-13

## 2024-11-13 RX ORDER — ONDANSETRON HCL/PF 4 MG/2 ML
1 VIAL (ML) INJECTION
Qty: 9 | Refills: 0
Start: 2024-11-13 | End: 2024-11-15

## 2024-11-13 RX ADMIN — Medication 4 MILLIGRAM(S): at 15:10

## 2024-11-13 RX ADMIN — Medication 1000 MILLILITER(S): at 13:17

## 2024-11-13 RX ADMIN — Medication 400 MILLIGRAM(S): at 13:16

## 2024-11-13 RX ADMIN — Medication 4 MILLIGRAM(S): at 13:16

## 2024-11-13 RX ADMIN — Medication 20 MILLIGRAM(S): at 13:16

## 2025-01-15 NOTE — PATIENT PROFILE ADULT. - BLOOD AVOIDANCE/RESTRICTIONS, PROFILE
Called and lvm for patient to return call, office number given.     RELAY:   Please schedule her for a telehealth visit with one of our providers. If one is not available please direct her to use the Urgent Care Video Visits through [employer].    none

## 2025-06-13 ENCOUNTER — APPOINTMENT (OUTPATIENT)
Dept: VASCULAR SURGERY | Facility: CLINIC | Age: 85
End: 2025-06-13